# Patient Record
Sex: FEMALE | Race: WHITE | HISPANIC OR LATINO | URBAN - METROPOLITAN AREA
[De-identification: names, ages, dates, MRNs, and addresses within clinical notes are randomized per-mention and may not be internally consistent; named-entity substitution may affect disease eponyms.]

---
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
## 2021-06-04 ENCOUNTER — EMERGENCY (EMERGENCY)
Facility: HOSPITAL | Age: 21
LOS: 1 days | Discharge: HOME | End: 2021-06-04
Attending: EMERGENCY MEDICINE | Admitting: EMERGENCY MEDICINE
Payer: COMMERCIAL

## 2021-06-04 VITALS
HEART RATE: 80 BPM | SYSTOLIC BLOOD PRESSURE: 137 MMHG | RESPIRATION RATE: 16 BRPM | OXYGEN SATURATION: 98 % | TEMPERATURE: 98 F | DIASTOLIC BLOOD PRESSURE: 88 MMHG

## 2021-06-04 VITALS
SYSTOLIC BLOOD PRESSURE: 131 MMHG | HEART RATE: 86 BPM | HEIGHT: 68 IN | WEIGHT: 220.02 LBS | TEMPERATURE: 98 F | DIASTOLIC BLOOD PRESSURE: 88 MMHG | RESPIRATION RATE: 17 BRPM | OXYGEN SATURATION: 99 %

## 2021-06-04 DIAGNOSIS — E28.2 POLYCYSTIC OVARIAN SYNDROME: ICD-10-CM

## 2021-06-04 DIAGNOSIS — Z3A.01 LESS THAN 8 WEEKS GESTATION OF PREGNANCY: ICD-10-CM

## 2021-06-04 DIAGNOSIS — O23.41 UNSPECIFIED INFECTION OF URINARY TRACT IN PREGNANCY, FIRST TRIMESTER: ICD-10-CM

## 2021-06-04 DIAGNOSIS — F60.3 BORDERLINE PERSONALITY DISORDER: ICD-10-CM

## 2021-06-04 DIAGNOSIS — O26.891 OTHER SPECIFIED PREGNANCY RELATED CONDITIONS, FIRST TRIMESTER: ICD-10-CM

## 2021-06-04 LAB
ALBUMIN SERPL ELPH-MCNC: 4.8 G/DL — SIGNIFICANT CHANGE UP (ref 3.3–5)
ALP SERPL-CCNC: 68 U/L — SIGNIFICANT CHANGE UP (ref 40–120)
ALT FLD-CCNC: 16 U/L — SIGNIFICANT CHANGE UP (ref 10–45)
ANION GAP SERPL CALC-SCNC: 13 MMOL/L — SIGNIFICANT CHANGE UP (ref 5–17)
APPEARANCE UR: CLEAR — SIGNIFICANT CHANGE UP
AST SERPL-CCNC: 12 U/L — SIGNIFICANT CHANGE UP (ref 10–40)
BACTERIA # UR AUTO: PRESENT /HPF
BASOPHILS # BLD AUTO: 0.08 K/UL — SIGNIFICANT CHANGE UP (ref 0–0.2)
BASOPHILS NFR BLD AUTO: 0.7 % — SIGNIFICANT CHANGE UP (ref 0–2)
BILIRUB SERPL-MCNC: 0.6 MG/DL — SIGNIFICANT CHANGE UP (ref 0.2–1.2)
BILIRUB UR-MCNC: ABNORMAL
BUN SERPL-MCNC: 5 MG/DL — LOW (ref 7–23)
CALCIUM SERPL-MCNC: 9.7 MG/DL — SIGNIFICANT CHANGE UP (ref 8.4–10.5)
CHLORIDE SERPL-SCNC: 103 MMOL/L — SIGNIFICANT CHANGE UP (ref 96–108)
CO2 SERPL-SCNC: 23 MMOL/L — SIGNIFICANT CHANGE UP (ref 22–31)
COLOR SPEC: YELLOW — SIGNIFICANT CHANGE UP
CREAT SERPL-MCNC: 0.59 MG/DL — SIGNIFICANT CHANGE UP (ref 0.5–1.3)
DIFF PNL FLD: NEGATIVE — SIGNIFICANT CHANGE UP
EOSINOPHIL # BLD AUTO: 0.12 K/UL — SIGNIFICANT CHANGE UP (ref 0–0.5)
EOSINOPHIL NFR BLD AUTO: 1.1 % — SIGNIFICANT CHANGE UP (ref 0–6)
EPI CELLS # UR: ABNORMAL /HPF (ref 0–5)
GLUCOSE SERPL-MCNC: 99 MG/DL — SIGNIFICANT CHANGE UP (ref 70–99)
GLUCOSE UR QL: NEGATIVE — SIGNIFICANT CHANGE UP
HCG SERPL-ACNC: HIGH MIU/ML
HCT VFR BLD CALC: 38.4 % — SIGNIFICANT CHANGE UP (ref 34.5–45)
HGB BLD-MCNC: 12.9 G/DL — SIGNIFICANT CHANGE UP (ref 11.5–15.5)
HIV 1+2 AB+HIV1 P24 AG SERPL QL IA: SIGNIFICANT CHANGE UP
IMM GRANULOCYTES NFR BLD AUTO: 0.3 % — SIGNIFICANT CHANGE UP (ref 0–1.5)
KETONES UR-MCNC: >=80 MG/DL
LEUKOCYTE ESTERASE UR-ACNC: ABNORMAL
LIDOCAIN IGE QN: 10 U/L — SIGNIFICANT CHANGE UP (ref 7–60)
LYMPHOCYTES # BLD AUTO: 2.42 K/UL — SIGNIFICANT CHANGE UP (ref 1–3.3)
LYMPHOCYTES # BLD AUTO: 22.4 % — SIGNIFICANT CHANGE UP (ref 13–44)
MCHC RBC-ENTMCNC: 31 PG — SIGNIFICANT CHANGE UP (ref 27–34)
MCHC RBC-ENTMCNC: 33.6 GM/DL — SIGNIFICANT CHANGE UP (ref 32–36)
MCV RBC AUTO: 92.3 FL — SIGNIFICANT CHANGE UP (ref 80–100)
MONOCYTES # BLD AUTO: 0.62 K/UL — SIGNIFICANT CHANGE UP (ref 0–0.9)
MONOCYTES NFR BLD AUTO: 5.8 % — SIGNIFICANT CHANGE UP (ref 2–14)
NEUTROPHILS # BLD AUTO: 7.51 K/UL — HIGH (ref 1.8–7.4)
NEUTROPHILS NFR BLD AUTO: 69.7 % — SIGNIFICANT CHANGE UP (ref 43–77)
NITRITE UR-MCNC: NEGATIVE — SIGNIFICANT CHANGE UP
NRBC # BLD: 0 /100 WBCS — SIGNIFICANT CHANGE UP (ref 0–0)
PH UR: 8 — SIGNIFICANT CHANGE UP (ref 5–8)
PLATELET # BLD AUTO: 355 K/UL — SIGNIFICANT CHANGE UP (ref 150–400)
POTASSIUM SERPL-MCNC: 3.9 MMOL/L — SIGNIFICANT CHANGE UP (ref 3.5–5.3)
POTASSIUM SERPL-SCNC: 3.9 MMOL/L — SIGNIFICANT CHANGE UP (ref 3.5–5.3)
PROT SERPL-MCNC: 8 G/DL — SIGNIFICANT CHANGE UP (ref 6–8.3)
PROT UR-MCNC: ABNORMAL MG/DL
RBC # BLD: 4.16 M/UL — SIGNIFICANT CHANGE UP (ref 3.8–5.2)
RBC # FLD: 12.3 % — SIGNIFICANT CHANGE UP (ref 10.3–14.5)
RBC CASTS # UR COMP ASSIST: < 5 /HPF — SIGNIFICANT CHANGE UP
SODIUM SERPL-SCNC: 139 MMOL/L — SIGNIFICANT CHANGE UP (ref 135–145)
SP GR SPEC: 1.02 — SIGNIFICANT CHANGE UP (ref 1–1.03)
UROBILINOGEN FLD QL: 1 E.U./DL — SIGNIFICANT CHANGE UP
WBC # BLD: 10.78 K/UL — HIGH (ref 3.8–10.5)
WBC # FLD AUTO: 10.78 K/UL — HIGH (ref 3.8–10.5)
WBC UR QL: ABNORMAL /HPF

## 2021-06-04 PROCEDURE — 96375 TX/PRO/DX INJ NEW DRUG ADDON: CPT

## 2021-06-04 PROCEDURE — 99285 EMERGENCY DEPT VISIT HI MDM: CPT

## 2021-06-04 PROCEDURE — 87086 URINE CULTURE/COLONY COUNT: CPT

## 2021-06-04 PROCEDURE — 76801 OB US < 14 WKS SINGLE FETUS: CPT

## 2021-06-04 PROCEDURE — 86901 BLOOD TYPING SEROLOGIC RH(D): CPT

## 2021-06-04 PROCEDURE — 86900 BLOOD TYPING SEROLOGIC ABO: CPT

## 2021-06-04 PROCEDURE — 99284 EMERGENCY DEPT VISIT MOD MDM: CPT | Mod: 25

## 2021-06-04 PROCEDURE — 85025 COMPLETE CBC W/AUTO DIFF WBC: CPT

## 2021-06-04 PROCEDURE — 84702 CHORIONIC GONADOTROPIN TEST: CPT

## 2021-06-04 PROCEDURE — 81001 URINALYSIS AUTO W/SCOPE: CPT

## 2021-06-04 PROCEDURE — 83690 ASSAY OF LIPASE: CPT

## 2021-06-04 PROCEDURE — 76817 TRANSVAGINAL US OBSTETRIC: CPT

## 2021-06-04 PROCEDURE — 80053 COMPREHEN METABOLIC PANEL: CPT

## 2021-06-04 PROCEDURE — 96374 THER/PROPH/DIAG INJ IV PUSH: CPT

## 2021-06-04 PROCEDURE — 86850 RBC ANTIBODY SCREEN: CPT

## 2021-06-04 PROCEDURE — 36415 COLL VENOUS BLD VENIPUNCTURE: CPT

## 2021-06-04 PROCEDURE — 87389 HIV-1 AG W/HIV-1&-2 AB AG IA: CPT

## 2021-06-04 PROCEDURE — 76801 OB US < 14 WKS SINGLE FETUS: CPT | Mod: 26

## 2021-06-04 PROCEDURE — 76817 TRANSVAGINAL US OBSTETRIC: CPT | Mod: 26

## 2021-06-04 RX ORDER — NITROFURANTOIN MACROCRYSTAL 50 MG
1 CAPSULE ORAL
Qty: 20 | Refills: 0
Start: 2021-06-04 | End: 2021-06-13

## 2021-06-04 RX ORDER — METOCLOPRAMIDE HCL 10 MG
10 TABLET ORAL ONCE
Refills: 0 | Status: COMPLETED | OUTPATIENT
Start: 2021-06-04 | End: 2021-06-04

## 2021-06-04 RX ORDER — FAMOTIDINE 10 MG/ML
20 INJECTION INTRAVENOUS ONCE
Refills: 0 | Status: COMPLETED | OUTPATIENT
Start: 2021-06-04 | End: 2021-06-04

## 2021-06-04 RX ORDER — METOCLOPRAMIDE HCL 10 MG
1 TABLET ORAL
Qty: 120 | Refills: 0
Start: 2021-06-04 | End: 2021-07-03

## 2021-06-04 RX ORDER — SODIUM CHLORIDE 9 MG/ML
1000 INJECTION INTRAMUSCULAR; INTRAVENOUS; SUBCUTANEOUS ONCE
Refills: 0 | Status: COMPLETED | OUTPATIENT
Start: 2021-06-04 | End: 2021-06-04

## 2021-06-04 RX ADMIN — FAMOTIDINE 20 MILLIGRAM(S): 10 INJECTION INTRAVENOUS at 11:20

## 2021-06-04 RX ADMIN — Medication 10 MILLIGRAM(S): at 11:20

## 2021-06-04 RX ADMIN — SODIUM CHLORIDE 1000 MILLILITER(S): 9 INJECTION INTRAMUSCULAR; INTRAVENOUS; SUBCUTANEOUS at 11:20

## 2021-06-04 NOTE — ED ADULT NURSE NOTE - MODE OF DISCHARGE
Will continue current insulin regimen for now.  Will continue monitoring FS and FU. Will continue monitoring FS and FU. Ambulatory

## 2021-06-04 NOTE — ED PROVIDER NOTE - GASTROINTESTINAL, MLM
Abdomen soft, non-tender, no guarding, no CVA tenderness, epigastric pain on palpation with mild cramping Abdomen soft, non-tender, no guarding, no CVA tenderness, epigastric pain on palpation with mild lower abdominal cramping

## 2021-06-04 NOTE — ED PROVIDER NOTE - CLINICAL SUMMARY MEDICAL DECISION MAKING FREE TEXT BOX
Patient with recent knowledge of pregnancy with normal labs and rising beta hcg. Sonogram shows + IUP at 6 weeks. RH +   Patient is feeling better. Recommend follow up with OB/GYN for prenatal follow up.

## 2021-06-04 NOTE — ED PROVIDER NOTE - PATIENT PORTAL LINK FT
You can access the FollowMyHealth Patient Portal offered by [facility] by registering at the following website: [email or web address]. By joining [email or web address]’s FollowMyHealth portal, you will also be able to view your health information using other applications (apps) compatible with our system.

## 2021-06-04 NOTE — ED PROVIDER NOTE - NSFOLLOWUPINSTRUCTIONS_ED_ALL_ED_FT
First Trimester of Pregnancy       The first trimester of pregnancy is from week 1 until the end of week 12 (months 1 through 3). During this time, your baby will begin to develop inside you. At 6–8 weeks, the eyes and face are formed, and the heartbeat can be seen on ultrasound. At the end of 12 weeks, all the baby's organs are formed.      Body changes during your first trimester    Your body goes through many changes during pregnancy. Changes vary from woman to woman.    Physical changes     •You may gain or lose a couple of pounds at first.       •Your breasts may begin to grow larger and get tender. The area around your nipples may get darker.      •Dark spots or blotches may develop on your face. This will likely fade after your baby is born.      •Your hair may thin out or get thick. Your hair will likely return to normal after your baby is born.      Health changes     •You may feel like you might vomit (nauseous), and you may vomit. Tell your doctor if you vomit for a long time.      •You may have heartburn.       •You may have headaches. You can take medicines for headache. Tell your doctor before you take any medicines.      •You may have trouble pooping (constipation).      •Your gums may bleed.      Other changes     •You may get tired easily.      •You may pee (urinate) more often. Tell your doctor if you have pain when you pee.      •Your menstrual periods will stop.      •You may have a loss of appetite.      •You may have cravings for certain kinds of food.      •Your feelings may change from happiness to sadness on different days.      •You may have more dreams.        What to expect during prenatal care  Now that you are pregnant, you will want to do everything you can to have a healthy baby. One of the most important things that you can do to help you have a healthy pregnancy is to get prenatal care. Prenatal care is all the medical care you receive before the birth of your baby. During prenatal care, your doctor will:  •Do a physical exam.      •Do tests, including blood tests.      •Ask you questions about your health.        Follow these instructions at home:    Medicines     •Take over-the-counter and prescription medicines only as told by your doctor. Some medicines are safe and some medicines are not safe during pregnancy.      •Take a prenatal vitamin that contains at least 600 micrograms (mcg) of folic acid.        Eating and drinking    •Eat healthy meals that include:  •Fresh fruits and vegetables.      •Whole grains.      •Good sources of protein, such as meat, eggs, or tofu.      •Low-fat dairy products.        •Your doctor will tell you the amount of weight gain that is right for you.      •Avoid raw meat and uncooked cheese.    •If you feel like you may vomit, or you vomit:  •Eat 4 or 5 small meals a day instead of 3 large meals.      •Try eating a few soda crackers.      •Drink liquids between meals instead of during meals.      •You may need to take these actions to prevent or treat trouble pooping:  •Drink enough fluids to keep your pee (urine) pale yellow.      •Take over-the-counter or prescription medicines.       •Eat foods that are high in fiber. These include beans, whole grains, and fresh fruits and vegetables.       •Limit foods that are high in fat and sugar. These include fried or sweet foods.        Activity     •Exercise only as told by your doctor. Stop if you have cramps or pain in your lower belly (abdomen) or low back.      • Do not exercise if it is too hot, too humid, or if you are in a place of great height (high altitude).      •Try to avoid standing for long periods of time. Move your legs often if you must  one place for a long time.      •Avoid heavy lifting.      •Wear low-heeled shoes. Sit and stand up straight.      •You can have sex unless your doctor tells you not to.      Relieving pain and discomfort     •Wear a good support bra if your breasts are sore.      •Rest with your legs raised (elevated) if you have leg cramps or low back pain.    •If you have bulging veins (varicose veins) in your legs:  •Wear support hose as told by your doctor.      •Raise your feet for 15 minutes, 3–4 times a day.      •Limit salt in your food.        Prenatal care     •Schedule your prenatal visits by week 12 of the pregnancy.      •Write down your questions. Take them to your prenatal visits.      •Keep all your prenatal visits as told by your doctor. This is important.      Safety     •Wear your seat belt at all times when driving.      •Talk to your doctor if someone is verbally or physically abusive to you.       •Talk to your doctor if you are feeling sad or have thoughts of hurting yourself.      Lifestyle     • Do not use hot tubs, steam rooms, or saunas.      • Do not douche or use tampons or scented sanitary pads.      •Avoid all herbal remedies and alcohol. Do not use street drugs or medicines that are not approved by your doctor.      • Do not use any products that contain nicotine or tobacco, such as cigarettes, e-cigarettes, and chewing tobacco. If you need help quitting, ask your doctor. You may get counseling or other support to help you quit.      •Avoid cat litter boxes and soil used by cats. These carry germs that can cause birth defects in the baby and can cause a loss of your baby (miscarriage) or stillbirth.      General instructions     •Ask your doctor for a referral to a local prenatal class. Begin classes no later than at the start of month 6 of your pregnancy.      •Ask for help if you need counseling or if you need help with nutrition. Your doctor can give you advice or tell you where to go for help.      • Do not cross your legs for long periods of time.      •Visit your dentist. At home, brush your teeth with a soft toothbrush. Floss gently.        Where to find more information    •American Pregnancy Association: americanpregnancy.org        Contact a doctor if:    •You are dizzy.      •You have a fever.      •You have mild cramps or pressure in your lower belly.      •You have a nagging pain in your belly area.      •You continue to feel like you may vomit, you vomit, or you have watery poop (diarrhea) for 24 hours or longer.      •You have a bad smelling fluid coming from your vagina.      •You have pain when you pee.      •You are exposed to a disease that spreads from person to person, such as chickenpox, measles, Zika virus, HIV, or hepatitis.        Get help right away if:    •You have spotting or bleeding from your vagina.      •You have very bad belly cramping or pain.      •You have shortness of breath or chest pain.      •You have any kind of trauma, such as from a fall or a car accident.        Summary    •The first trimester of pregnancy is from week 1 until the end of week 12 (months 1 through 3).      •To take care of yourself and your unborn baby, you will need to eat healthy meals, take medicines only if your doctor tells you to do so, and do activities that are safe for you and your baby.      •Keep all your prenatal visits as told by your doctor. This is important.      This information is not intended to replace advice given to you by your health care provider. Make sure you discuss any questions you have with your health care provider.      Document Revised: 10/19/2020 Document Reviewed: 09/08/2020    [employer]vier Patient Education © 2021 [employer]

## 2021-06-04 NOTE — ED PROVIDER NOTE - OBJECTIVE STATEMENT
22 y/o F PMHx of PCOS, last LMP 2 mos ago. Patient presents today with nausea, vomiting, epigastric pain, some abd pain for the last 2 mos. Denies fevers, chest pain, SOB, vaginal bleeding/spotting, urinary symptoms. Patient notes that she is trying to conceive. 22 y/o F PMHx of PCOS, last LMP 2 mos ago. Patient presents today with nausea, vomiting, epigastric pain, some abd cramping for the last 2 mos. Denies fevers, chest pain, SOB, vaginal bleeding/spotting, urinary symptoms. Patient notes that she is trying to conceive. 20 y/o F PMHx of PCOS, last LMP 2 mos ago. Patient presents today with nausea, vomiting, epigastric pain, some abd cramping for the last 2 mos. Denies fevers, chest pain, SOB, vaginal bleeding/spotting, urinary symptoms. Patient states that she is trying to conceive.

## 2021-06-04 NOTE — ED ADULT TRIAGE NOTE - CHIEF COMPLAINT QUOTE
c/o diffused abdominal pain, nausea and vomiting x 1 week.  Last bm yesterday.  LMP 2 months ago.  Hx PCOS

## 2021-06-04 NOTE — ED PROVIDER NOTE - ATTENDING CONTRIBUTION TO CARE
20 yo w PCOS, LMP 2 months ago, 2 wks of n/v epigastric pain, no vag bleeding, + POS PREG  eval for ectopic , U/S pending, , UA, consider symptoms secondary to normal preg  cbta, minimal suprapubic tenderness,   dispo pending workup / reeval

## 2021-06-04 NOTE — ED ADULT NURSE NOTE - OBJECTIVE STATEMENT
Pt reports abd pain, n/v x 1 week. Pt reports LMP 2 months ago, hx of PCOS. Pt denies vaginal bleeding, chills, fever.

## 2021-06-05 LAB
CULTURE RESULTS: SIGNIFICANT CHANGE UP
SPECIMEN SOURCE: SIGNIFICANT CHANGE UP

## 2021-07-14 ENCOUNTER — APPOINTMENT (OUTPATIENT)
Dept: ANTEPARTUM | Facility: CLINIC | Age: 21
End: 2021-07-14
Payer: MEDICAID

## 2021-07-14 ENCOUNTER — ASOB RESULT (OUTPATIENT)
Age: 21
End: 2021-07-14

## 2021-07-14 PROBLEM — E28.2 POLYCYSTIC OVARIAN SYNDROME: Chronic | Status: ACTIVE | Noted: 2021-06-05

## 2021-07-14 PROCEDURE — 76801 OB US < 14 WKS SINGLE FETUS: CPT

## 2021-07-14 PROCEDURE — 76813 OB US NUCHAL MEAS 1 GEST: CPT

## 2021-07-19 ENCOUNTER — INPATIENT (INPATIENT)
Facility: HOSPITAL | Age: 21
LOS: 3 days | Discharge: HOME | DRG: 832 | End: 2021-07-23
Attending: PSYCHIATRY & NEUROLOGY | Admitting: PSYCHIATRY & NEUROLOGY
Payer: COMMERCIAL

## 2021-07-19 VITALS
SYSTOLIC BLOOD PRESSURE: 133 MMHG | HEART RATE: 104 BPM | DIASTOLIC BLOOD PRESSURE: 83 MMHG | OXYGEN SATURATION: 99 % | RESPIRATION RATE: 18 BRPM | HEIGHT: 68 IN | TEMPERATURE: 98 F | WEIGHT: 210.98 LBS

## 2021-07-19 DIAGNOSIS — F41.9 ANXIETY DISORDER, UNSPECIFIED: ICD-10-CM

## 2021-07-19 DIAGNOSIS — E28.2 POLYCYSTIC OVARIAN SYNDROME: ICD-10-CM

## 2021-07-19 DIAGNOSIS — F60.3 BORDERLINE PERSONALITY DISORDER: ICD-10-CM

## 2021-07-19 DIAGNOSIS — F32.2 MAJOR DEPRESSIVE DISORDER, SINGLE EPISODE, SEVERE WITHOUT PSYCHOTIC FEATURES: ICD-10-CM

## 2021-07-19 DIAGNOSIS — O99.342 OTHER MENTAL DISORDERS COMPLICATING PREGNANCY, SECOND TRIMESTER: ICD-10-CM

## 2021-07-19 DIAGNOSIS — O99.891 OTHER SPECIFIED DISEASES AND CONDITIONS COMPLICATING PREGNANCY: ICD-10-CM

## 2021-07-19 DIAGNOSIS — R45.851 SUICIDAL IDEATIONS: ICD-10-CM

## 2021-07-19 DIAGNOSIS — M41.9 SCOLIOSIS, UNSPECIFIED: ICD-10-CM

## 2021-07-19 DIAGNOSIS — F43.10 POST-TRAUMATIC STRESS DISORDER, UNSPECIFIED: ICD-10-CM

## 2021-07-19 DIAGNOSIS — Z3A.14 14 WEEKS GESTATION OF PREGNANCY: ICD-10-CM

## 2021-07-19 DIAGNOSIS — O99.282 ENDOCRINE, NUTRITIONAL AND METABOLIC DISEASES COMPLICATING PREGNANCY, SECOND TRIMESTER: ICD-10-CM

## 2021-07-19 DIAGNOSIS — F90.9 ATTENTION-DEFICIT HYPERACTIVITY DISORDER, UNSPECIFIED TYPE: ICD-10-CM

## 2021-07-19 LAB
ALBUMIN SERPL ELPH-MCNC: 3.9 G/DL — SIGNIFICANT CHANGE UP (ref 3.3–5)
ALP SERPL-CCNC: 83 U/L — SIGNIFICANT CHANGE UP (ref 40–120)
ALT FLD-CCNC: 46 U/L — HIGH (ref 10–45)
AMPHET UR-MCNC: NEGATIVE — SIGNIFICANT CHANGE UP
ANION GAP SERPL CALC-SCNC: 15 MMOL/L — SIGNIFICANT CHANGE UP (ref 5–17)
APAP SERPL-MCNC: <5 UG/ML — LOW (ref 10–30)
APPEARANCE UR: ABNORMAL
AST SERPL-CCNC: 27 U/L — SIGNIFICANT CHANGE UP (ref 10–40)
BACTERIA # UR AUTO: SIGNIFICANT CHANGE UP /HPF
BARBITURATES UR SCN-MCNC: NEGATIVE — SIGNIFICANT CHANGE UP
BASOPHILS # BLD AUTO: 0.04 K/UL — SIGNIFICANT CHANGE UP (ref 0–0.2)
BASOPHILS NFR BLD AUTO: 0.4 % — SIGNIFICANT CHANGE UP (ref 0–2)
BENZODIAZ UR-MCNC: NEGATIVE — SIGNIFICANT CHANGE UP
BILIRUB SERPL-MCNC: 0.3 MG/DL — SIGNIFICANT CHANGE UP (ref 0.2–1.2)
BILIRUB UR-MCNC: NEGATIVE — SIGNIFICANT CHANGE UP
BUN SERPL-MCNC: 5 MG/DL — LOW (ref 7–23)
CALCIUM SERPL-MCNC: 9.9 MG/DL — SIGNIFICANT CHANGE UP (ref 8.4–10.5)
CHLORIDE SERPL-SCNC: 103 MMOL/L — SIGNIFICANT CHANGE UP (ref 96–108)
CO2 SERPL-SCNC: 20 MMOL/L — LOW (ref 22–31)
COCAINE METAB.OTHER UR-MCNC: NEGATIVE — SIGNIFICANT CHANGE UP
COLOR SPEC: YELLOW — SIGNIFICANT CHANGE UP
COMMENT - URINE: SIGNIFICANT CHANGE UP
CREAT SERPL-MCNC: 0.52 MG/DL — SIGNIFICANT CHANGE UP (ref 0.5–1.3)
DIFF PNL FLD: NEGATIVE — SIGNIFICANT CHANGE UP
EOSINOPHIL # BLD AUTO: 0.09 K/UL — SIGNIFICANT CHANGE UP (ref 0–0.5)
EOSINOPHIL NFR BLD AUTO: 0.9 % — SIGNIFICANT CHANGE UP (ref 0–6)
EPI CELLS # UR: ABNORMAL /HPF (ref 0–5)
ETHANOL SERPL-MCNC: <10 MG/DL — SIGNIFICANT CHANGE UP (ref 0–10)
GLUCOSE SERPL-MCNC: 110 MG/DL — HIGH (ref 70–99)
GLUCOSE UR QL: NEGATIVE — SIGNIFICANT CHANGE UP
HCT VFR BLD CALC: 33.9 % — LOW (ref 34.5–45)
HGB BLD-MCNC: 11.4 G/DL — LOW (ref 11.5–15.5)
IMM GRANULOCYTES NFR BLD AUTO: 0.6 % — SIGNIFICANT CHANGE UP (ref 0–1.5)
KETONES UR-MCNC: 15 MG/DL
LEUKOCYTE ESTERASE UR-ACNC: ABNORMAL
LYMPHOCYTES # BLD AUTO: 1.68 K/UL — SIGNIFICANT CHANGE UP (ref 1–3.3)
LYMPHOCYTES # BLD AUTO: 17.6 % — SIGNIFICANT CHANGE UP (ref 13–44)
MCHC RBC-ENTMCNC: 30.6 PG — SIGNIFICANT CHANGE UP (ref 27–34)
MCHC RBC-ENTMCNC: 33.6 GM/DL — SIGNIFICANT CHANGE UP (ref 32–36)
MCV RBC AUTO: 90.9 FL — SIGNIFICANT CHANGE UP (ref 80–100)
METHADONE UR-MCNC: NEGATIVE — SIGNIFICANT CHANGE UP
MONOCYTES # BLD AUTO: 0.44 K/UL — SIGNIFICANT CHANGE UP (ref 0–0.9)
MONOCYTES NFR BLD AUTO: 4.6 % — SIGNIFICANT CHANGE UP (ref 2–14)
NEUTROPHILS # BLD AUTO: 7.25 K/UL — SIGNIFICANT CHANGE UP (ref 1.8–7.4)
NEUTROPHILS NFR BLD AUTO: 75.9 % — SIGNIFICANT CHANGE UP (ref 43–77)
NITRITE UR-MCNC: NEGATIVE — SIGNIFICANT CHANGE UP
NRBC # BLD: 0 /100 WBCS — SIGNIFICANT CHANGE UP (ref 0–0)
OPIATES UR-MCNC: NEGATIVE — SIGNIFICANT CHANGE UP
PCP SPEC-MCNC: SIGNIFICANT CHANGE UP
PCP UR-MCNC: NEGATIVE — SIGNIFICANT CHANGE UP
PH UR: 7 — SIGNIFICANT CHANGE UP (ref 5–8)
PLATELET # BLD AUTO: 325 K/UL — SIGNIFICANT CHANGE UP (ref 150–400)
POTASSIUM SERPL-MCNC: 3.9 MMOL/L — SIGNIFICANT CHANGE UP (ref 3.5–5.3)
POTASSIUM SERPL-SCNC: 3.9 MMOL/L — SIGNIFICANT CHANGE UP (ref 3.5–5.3)
PROT SERPL-MCNC: 6.9 G/DL — SIGNIFICANT CHANGE UP (ref 6–8.3)
PROT UR-MCNC: ABNORMAL MG/DL
RBC # BLD: 3.73 M/UL — LOW (ref 3.8–5.2)
RBC # FLD: 11.9 % — SIGNIFICANT CHANGE UP (ref 10.3–14.5)
RBC CASTS # UR COMP ASSIST: < 5 /HPF — SIGNIFICANT CHANGE UP
SALICYLATES SERPL-MCNC: <0.3 MG/DL — LOW (ref 2.8–20)
SARS-COV-2 RNA SPEC QL NAA+PROBE: SIGNIFICANT CHANGE UP
SODIUM SERPL-SCNC: 138 MMOL/L — SIGNIFICANT CHANGE UP (ref 135–145)
SP GR SPEC: 1.02 — SIGNIFICANT CHANGE UP (ref 1–1.03)
THC UR QL: POSITIVE
UROBILINOGEN FLD QL: 1 E.U./DL — SIGNIFICANT CHANGE UP
WBC # BLD: 9.56 K/UL — SIGNIFICANT CHANGE UP (ref 3.8–10.5)
WBC # FLD AUTO: 9.56 K/UL — SIGNIFICANT CHANGE UP (ref 3.8–10.5)
WBC UR QL: ABNORMAL /HPF

## 2021-07-19 PROCEDURE — 76805 OB US >/= 14 WKS SNGL FETUS: CPT | Mod: 26

## 2021-07-19 PROCEDURE — 93010 ELECTROCARDIOGRAM REPORT: CPT

## 2021-07-19 PROCEDURE — 90792 PSYCH DIAG EVAL W/MED SRVCS: CPT

## 2021-07-19 PROCEDURE — 76817 TRANSVAGINAL US OBSTETRIC: CPT | Mod: 26

## 2021-07-19 PROCEDURE — 99285 EMERGENCY DEPT VISIT HI MDM: CPT

## 2021-07-19 RX ORDER — ONDANSETRON 8 MG/1
8 TABLET, FILM COATED ORAL EVERY 8 HOURS
Refills: 0 | Status: DISCONTINUED | OUTPATIENT
Start: 2021-07-19 | End: 2021-07-19

## 2021-07-19 RX ORDER — LANOLIN ALCOHOL/MO/W.PET/CERES
1 CREAM (GRAM) TOPICAL AT BEDTIME
Refills: 0 | Status: DISCONTINUED | OUTPATIENT
Start: 2021-07-19 | End: 2021-07-23

## 2021-07-19 RX ORDER — ONDANSETRON 8 MG/1
4 TABLET, FILM COATED ORAL THREE TIMES A DAY
Refills: 0 | Status: DISCONTINUED | OUTPATIENT
Start: 2021-07-19 | End: 2021-07-23

## 2021-07-19 RX ORDER — HALOPERIDOL DECANOATE 100 MG/ML
2 INJECTION INTRAMUSCULAR EVERY 6 HOURS
Refills: 0 | Status: DISCONTINUED | OUTPATIENT
Start: 2021-07-19 | End: 2021-07-23

## 2021-07-19 RX ORDER — ACETAMINOPHEN 500 MG
650 TABLET ORAL EVERY 6 HOURS
Refills: 0 | Status: DISCONTINUED | OUTPATIENT
Start: 2021-07-19 | End: 2021-07-23

## 2021-07-19 NOTE — ED ADULT TRIAGE NOTE - ESI TRIAGE ACUITY LEVEL, MLM
[phone].    Thank you for choosing [facility] 2

## 2021-07-19 NOTE — ED ADULT NURSE REASSESSMENT NOTE - NS ED NURSE REASSESS COMMENT FT1
pt remains on 1:1 CO for safety and elopement risk, awaiting bed assignment on psych floor but pt is 14 weeks pregnant must get gyn c/s and med clearance

## 2021-07-19 NOTE — ED PROVIDER NOTE - OBJECTIVE STATEMENT
20 y/o, , approximately 14 weeks pregnant, w/ Hx of depression, borderline personality disorder presents to ED for suicidal thoughts for a while, which got acutely worst in the past few days. Pt reports thinking about overdosing on med or jump in front of ongoing traffic. Also states that she has thoughts about hurting other people if they make her angry. Denies hallucinations, drug or alcohol use. Reports having vaginal bleeding which was treated and resolved after BV. Denies abd pain, fevers, chills or other symptoms. 20 y/o, , approximately 14 weeks pregnant, w/ Hx of depression, borderline personality disorder presents to ED for suicidal thoughts for a while, which got acutely worse in the past few days. Pt reports thinking about overdosing on med or jumping in front of ongoing traffic. Also states that she has thoughts about hurting other people if they make her angry. Denies hallucinations, drug or alcohol use. Reports having vaginal spotting 2 wks ago, was dx'd w/ BV and tx'd w/ meds with resolution of spotting. Denies abd pain, urinary sx's, fevers, chills or other symptoms.

## 2021-07-19 NOTE — ED PROVIDER NOTE - PHYSICAL EXAMINATION
VITAL SIGNS: I have reviewed nursing notes and confirm.  CONSTITUTIONAL: In no acute distress.   SKIN:  warm and dry, no acute rash.   HEAD:  normocephalic, atraumatic.  EYES: EOM intact; conjunctiva and sclera clear.  ENT: No nasal discharge; airway clear.   NECK: Supple; non tender.  CARD: S1, S2 normal; no murmurs, gallops, or rubs. Regular rate and rhythm.   RESP:  Clear to auscultation b/l, no wheezes, rales or rhonchi.  ABD: Normal bowel sounds; soft; non-distended; non-tender; no guarding/ rebound.  EXT: Normal ROM. No clubbing, cyanosis or edema. 2+ pulses to b/l ue/le.  NEURO: Alert, oriented, grossly unremarkable  PSYCH: Cooperative, mood and affect appropriate.

## 2021-07-19 NOTE — ED ADULT NURSE NOTE - OBJECTIVE STATEMENT
pt received into  7 spot A&Ox3 ambulatory appears comfortable arrives via walk in triage with mother for eval of suicidal thoughts x 1 week hx borderline personality disorder with attempt 3 years ago pt is currently 14 weeks pregnant denies homicidal ideations or plan at this time. makes good eye contact appears good hygiene no medical complaints currently. placed on 1:1 CP for safety elopement risk pt willingly provided urine blood samples allowed for covid swab awaiting md and psych eval in nad will monitor and reassess

## 2021-07-19 NOTE — ED BEHAVIORAL HEALTH ASSESSMENT NOTE - HPI (INCLUDE ILLNESS QUALITY, SEVERITY, DURATION, TIMING, CONTEXT, MODIFYING FACTORS, ASSOCIATED SIGNS AND SYMPTOMS)
21 domiciled, pregnant F, pph MDD, Borderline PD, hx of 4 suicide attempts, previous psychiatric admissions - most recently 2018, not currently in Outpatient care, pmh Scoliosis, PCOS, current ear infection - on Amoxicillin, BIBS accompanied by mother endorsing worsening suicidal ideation over the past few days. Pt. seen alongside Psychology Intern in the presence of pt's mother who remained present during evaluation at pt request, the pt is sitting in a chair, dressed in hospital gown, hair dyed blue, calm, cooperative. Pt. reports her mood recently as "down." She reports "don't have any" energy, "horrible" concentration, "excessive" sleep, and "barely eat" appetite. Pt. reports recent feelings of guilt, helplessness, hopelessness, and worthlessness. Pt. reports suicidal ideation with plan to overdose or walk in front of traffic, she reports chronic passive SI but states it intensified significantly yesterday as when she started formulating a plan. Pt. reports 4 previous attempts, most recently 3 years ago when she jumped in front of traffic; pt reports 2 prior attempts by cutting and one by overdose. Pt. denies current HI, though notes she has had these thoughts in the past. Pt. denies AH/VH.   Pt. denies recent substance use other than smoking MJ about a month ago before she learned she was pregnant (utox + THC).  Pt. reports several medication trials in the past, none of which were very effective, most recently Celexa 40mg which she stopped 6months ago at which time she also stopped seeing a psychiatrist. Pt. reports a hx of cutting in her teenage years, most recently 3 years ago.  Note - pt denies having taken Lamictal in the past but records indicative she has been prescribed it in the past (no suspicion pt was intentionally misleading).     Mother supports all information as provided by the patient. 21 domiciled, pregnant F, pph MDD, Borderline PD, hx of 4 suicide attempts, previous psychiatric admissions - most recently 2018, not currently in Outpatient care, pmh Scoliosis, PCOS, current yeast infection - on Monostat day 2/7, BIBS accompanied by mother endorsing worsening suicidal ideation over the past few days. Pt. seen alongside Psychology Intern in the presence of pt's mother who remained present during evaluation at pt request, the pt is sitting in a chair, dressed in hospital gown, hair dyed blue, calm, cooperative. Pt. reports her mood recently as "down." She reports "don't have any" energy, "horrible" concentration, "excessive" sleep, and "barely eat" appetite. Pt. reports recent feelings of guilt, helplessness, hopelessness, and worthlessness. Pt. reports suicidal ideation with plan to overdose or walk in front of traffic, she reports chronic passive SI but states it intensified significantly yesterday as when she started formulating a plan. Pt. reports 4 previous attempts, most recently 3 years ago when she jumped in front of traffic; pt reports 2 prior attempts by cutting and one by overdose. Pt. denies current HI, though notes she has had these thoughts in the past. Pt. denies AH/VH.   Pt. denies recent substance use other than smoking MJ about a month ago before she learned she was pregnant (utox + THC).  Pt. reports several medication trials in the past, none of which were very effective, most recently Celexa 40mg which she stopped 6months ago at which time she also stopped seeing a psychiatrist. Pt. reports a hx of cutting in her teenage years, most recently 3 years ago.  Note - pt denies having taken Lamictal in the past but records indicative she has been prescribed it in the past (no suspicion pt was intentionally misleading).     Mother supports all information as provided by the patient.    current Monostat and Zofran prescriptions confirmed with pt's pharmacy:   [facility]   [address] [place] [address]  Phone: [phone]

## 2021-07-19 NOTE — ED BEHAVIORAL HEALTH ASSESSMENT NOTE - SUMMARY
21 domiciled, pregnant F, pph MDD, Borderline PD, hx of 4 suicide attempts, previous psychiatric admissions - most recently 2018, not currently in Outpatient care, pmh Scoliosis, PCOS, current ear infection - on Amoxicillin, BIBS accompanied by mother endorsing worsening suicidal ideation over the past few days.  Pt. hx of Borderline PD and previous ED presentation for "feelings of wanting to hurt a woman she saw earlier today" are appreciated, however, current episode appears to represent a severe disturbance in affective state with a high risk potential given pt's complete psychiatric hx and current comorbidities i.e. pregnancy; pt's decision to admit herself Voluntarily for stabilization is strongly supported. 22yo domiciled, pregnant F, pph MDD, Borderline PD, hx of 4 suicide attempts, previous psychiatric admissions - most recently 2018, not currently in Outpatient care, pmh Scoliosis, PCOS, current yeast infection - on Monostat, BIBS accompanied by mother endorsing worsening suicidal ideation over the past few days.  Pt. hx of Borderline PD and previous ED presentation for "feelings of wanting to hurt a woman she saw earlier today" are appreciated, however, current episode appears to represent a severe disturbance in affective state with a high risk potential given pt's complete psychiatric hx and current comorbidities i.e. pregnancy; pt's decision to admit herself Voluntarily for stabilization and to ensure safety is strongly supported.

## 2021-07-19 NOTE — ED ADULT NURSE REASSESSMENT NOTE - NS ED NURSE REASSESS COMMENT FT1
pt becoming slightly agitated requesting to go upstairs and specific foods not kept in Ed. will attempt to call kitchen to follow up
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
## 2021-07-19 NOTE — CONSULT NOTE ADULT - ASSESSMENT
20yo  at 14+0 by 1st TM US (last LMP unknown, SKINNY 22) with PCOS, MDD, and borderline personality disorder with hx of multiple suicide attempts, currently being admitted for SI.  -VSS  -Pt has close outpt OB followup and seems to be very compliant with her appointments and care  -Will f/u OB US       22yo  at 14+0 by 1st TM US (last LMP unknown, SKINNY 22) with PCOS, MDD, and borderline personality disorder with hx of multiple suicide attempts, currently being admitted for SI.  -VSS  -Pt has close outpt OB followup and seems to be very compliant with her appointments and care  -Will f/u OB US  -Pending normal US, OB will sign off   -Can reconsult at any time    Discussed with Dr. Rowell

## 2021-07-19 NOTE — CONSULT NOTE ADULT - ATTENDING COMMENTS
Agree with plan for admission on the psych service.   No active OB issues at this time.   If patient will be here for several weeks will follow for routine care.

## 2021-07-19 NOTE — ED PROVIDER NOTE - PROGRESS NOTE DETAILS
cristian: pt was NOT signed out to me. asked by charge nurse to place us pelvis/tv per gyn request. gyn contacted. they will fu on us results and continue to follow pt upon admission. no longer involved.
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
## 2021-07-19 NOTE — ED ADULT NURSE REASSESSMENT NOTE - NS ED NURSE REASSESS COMMENT FT1
pt remains on 1:1 CO for safety and elopement risk eating and drinking in nad at this time awaiting covid swab result and admission

## 2021-07-19 NOTE — ED PROVIDER NOTE - CLINICAL SUMMARY MEDICAL DECISION MAKING FREE TEXT BOX
Impression: 14 weeks pregnant F, , w/ Hx of depression and borderline personality depression, who presents to ED for SI and occasional HI. Will obtain psych consult and await recommendation,. Impression: 14 weeks pregnant F, , w/ Hx of depression and borderline personality depression, who presents to ED for worsening depression and SI. Pt seen by psych and admission recommended for inpt tx. Tylenol, ASA, ETOH levels neg. Utox + for marijuana. UA neg for infection, + small ketones- pt given po fluids. COVID neg. EKG non-ischemic. Pt has no medical contraindications to psych admission.

## 2021-07-19 NOTE — ED BEHAVIORAL HEALTH ASSESSMENT NOTE - RISK ASSESSMENT
High Acute Suicide Risk Static: history of suicide attempt, hx of substance abuse, hx of impulsive behavior.  Modifiable: Not currently engaged in treatment  Protective: Strong familial relationships, fear of death/dying  Current Risk: HIGH  Current Risk will be mitigated with Inpatient Hospitalization.
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
## 2021-07-19 NOTE — ED BEHAVIORAL HEALTH ASSESSMENT NOTE - OTHER PAST PSYCHIATRIC HISTORY (INCLUDE DETAILS REGARDING ONSET, COURSE OF ILLNESS, INPATIENT/OUTPATIENT TREATMENT)
Four hospitalizations at [facility], last was in January 2018 for three weeks after disappearing for two weeks. Many hospitalizations have been in context of disappearing or oppositional behavior, also one time for taking an overdose. Was in outpatient treatment at [facility] until went to [facility] in April 2018, never went back since returning. Has done DBT at [facility] at 15 years old quit 2 months, tried again at 17 (beginning of March 2018) at [facility].

## 2021-07-19 NOTE — ED ADULT TRIAGE NOTE - CHIEF COMPLAINT QUOTE
patient is 14 weeks pregnant . she states that she has been having suicidal thoughts for the last week. hx of attempt 3 years ago.

## 2021-07-20 DIAGNOSIS — F60.3 BORDERLINE PERSONALITY DISORDER: ICD-10-CM

## 2021-07-20 DIAGNOSIS — B37.9 CANDIDIASIS, UNSPECIFIED: ICD-10-CM

## 2021-07-20 DIAGNOSIS — F32.9 MAJOR DEPRESSIVE DISORDER, SINGLE EPISODE, UNSPECIFIED: ICD-10-CM

## 2021-07-20 LAB
A1C WITH ESTIMATED AVERAGE GLUCOSE RESULT: 4.7 % — SIGNIFICANT CHANGE UP (ref 4–5.6)
ALBUMIN SERPL ELPH-MCNC: 3.6 G/DL — SIGNIFICANT CHANGE UP (ref 3.3–5)
ALP SERPL-CCNC: 58 U/L — SIGNIFICANT CHANGE UP (ref 40–120)
ALT FLD-CCNC: 39 U/L — SIGNIFICANT CHANGE UP (ref 10–45)
ANION GAP SERPL CALC-SCNC: 10 MMOL/L — SIGNIFICANT CHANGE UP (ref 5–17)
AST SERPL-CCNC: 17 U/L — SIGNIFICANT CHANGE UP (ref 10–40)
BILIRUB SERPL-MCNC: 0.2 MG/DL — SIGNIFICANT CHANGE UP (ref 0.2–1.2)
BUN SERPL-MCNC: 5 MG/DL — LOW (ref 7–23)
CALCIUM SERPL-MCNC: 9.1 MG/DL — SIGNIFICANT CHANGE UP (ref 8.4–10.5)
CHLORIDE SERPL-SCNC: 105 MMOL/L — SIGNIFICANT CHANGE UP (ref 96–108)
CHOLEST SERPL-MCNC: 172 MG/DL — SIGNIFICANT CHANGE UP
CO2 SERPL-SCNC: 22 MMOL/L — SIGNIFICANT CHANGE UP (ref 22–31)
COVID-19 SPIKE DOMAIN AB INTERP: POSITIVE
COVID-19 SPIKE DOMAIN ANTIBODY RESULT: 226 U/ML — HIGH
CREAT SERPL-MCNC: 0.52 MG/DL — SIGNIFICANT CHANGE UP (ref 0.5–1.3)
ESTIMATED AVERAGE GLUCOSE: 88 MG/DL — SIGNIFICANT CHANGE UP (ref 68–114)
GLUCOSE SERPL-MCNC: 85 MG/DL — SIGNIFICANT CHANGE UP (ref 70–99)
HCG SERPL-ACNC: HIGH MIU/ML
HCT VFR BLD CALC: 31.6 % — LOW (ref 34.5–45)
HDLC SERPL-MCNC: 48 MG/DL — LOW
HGB BLD-MCNC: 10.7 G/DL — LOW (ref 11.5–15.5)
LIPID PNL WITH DIRECT LDL SERPL: 73 MG/DL — SIGNIFICANT CHANGE UP
MCHC RBC-ENTMCNC: 30.7 PG — SIGNIFICANT CHANGE UP (ref 27–34)
MCHC RBC-ENTMCNC: 33.9 GM/DL — SIGNIFICANT CHANGE UP (ref 32–36)
MCV RBC AUTO: 90.8 FL — SIGNIFICANT CHANGE UP (ref 80–100)
NON HDL CHOLESTEROL: 124 MG/DL — SIGNIFICANT CHANGE UP
NRBC # BLD: 0 /100 WBCS — SIGNIFICANT CHANGE UP (ref 0–0)
PLATELET # BLD AUTO: 294 K/UL — SIGNIFICANT CHANGE UP (ref 150–400)
POTASSIUM SERPL-MCNC: 3.8 MMOL/L — SIGNIFICANT CHANGE UP (ref 3.5–5.3)
POTASSIUM SERPL-SCNC: 3.8 MMOL/L — SIGNIFICANT CHANGE UP (ref 3.5–5.3)
PROT SERPL-MCNC: 6.1 G/DL — SIGNIFICANT CHANGE UP (ref 6–8.3)
RBC # BLD: 3.48 M/UL — LOW (ref 3.8–5.2)
RBC # FLD: 11.9 % — SIGNIFICANT CHANGE UP (ref 10.3–14.5)
SARS-COV-2 IGG+IGM SERPL QL IA: 226 U/ML — HIGH
SARS-COV-2 IGG+IGM SERPL QL IA: POSITIVE
SODIUM SERPL-SCNC: 137 MMOL/L — SIGNIFICANT CHANGE UP (ref 135–145)
TRIGL SERPL-MCNC: 256 MG/DL — HIGH
TSH SERPL-MCNC: 0.24 UIU/ML — LOW (ref 0.27–4.2)
WBC # BLD: 8.06 K/UL — SIGNIFICANT CHANGE UP (ref 3.8–10.5)
WBC # FLD AUTO: 8.06 K/UL — SIGNIFICANT CHANGE UP (ref 3.8–10.5)

## 2021-07-20 RX ORDER — SERTRALINE 25 MG/1
25 TABLET, FILM COATED ORAL ONCE
Refills: 0 | Status: COMPLETED | OUTPATIENT
Start: 2021-07-20 | End: 2021-07-20

## 2021-07-20 RX ORDER — DIPHENHYDRAMINE HCL 50 MG
25 CAPSULE ORAL ONCE
Refills: 0 | Status: COMPLETED | OUTPATIENT
Start: 2021-07-20 | End: 2021-07-20

## 2021-07-20 RX ORDER — SERTRALINE 25 MG/1
25 TABLET, FILM COATED ORAL DAILY
Refills: 0 | Status: DISCONTINUED | OUTPATIENT
Start: 2021-07-20 | End: 2021-07-23

## 2021-07-20 RX ADMIN — Medication 1 APPLICATORFUL: at 21:44

## 2021-07-20 RX ADMIN — Medication 1 TABLET(S): at 12:42

## 2021-07-20 RX ADMIN — Medication 25 MILLIGRAM(S): at 22:23

## 2021-07-20 NOTE — BEHAVIORAL HEALTH ASSESSMENT NOTE - NSBHCHARTREVIEWIMAGING_PSY_A_CORE FT
OB US second trimester 7/19: single viable intrauterine gestation is visible with an average ultrasound age of 14 weeks 1 day.

## 2021-07-20 NOTE — BEHAVIORAL HEALTH ASSESSMENT NOTE - CASE SUMMARY
Met with pt, sister, and mother. Pt is no longer suicidal at this time and requesting discharge sooner rather than later. Explained to pt & family that in order to set up intensive outpatient services and/or DBT element of treatment, pt would need to stay longer than 72 hours as pt speaking of putting in 72 hour letter immediately. They voiced understanding and said they would see how she tolerated being on the unit. Will also discuss with team regarding our PMAD.
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
## 2021-07-20 NOTE — BEHAVIORAL HEALTH ASSESSMENT NOTE - RISK ASSESSMENT
Moderate Acute Suicide Risk Static: pts past psychiatric history, suicide attempts  Modifiable: aggressive behavior, therapy, relationships with family/friends  Protective: mother and sister support system, pregnancy is a big motivating factor for pt

## 2021-07-20 NOTE — BEHAVIORAL HEALTH ASSESSMENT NOTE - NSBHCHARTREVIEWLAB_PSY_A_CORE FT
10.7   8.06  )-----------( 294      ( 20 Jul 2021 07:26 )             31.6   LIVER FUNCTIONS - ( 20 Jul 2021 07:26 )  Alb: 3.6 g/dL / Pro: 6.1 g/dL / ALK PHOS: 58 U/L / ALT: 39 U/L / AST: 17 U/L / GGT: x         a1c: 4.7

## 2021-07-20 NOTE — BEHAVIORAL HEALTH ASSESSMENT NOTE - SUMMARY
22 yo single, unemployed, domicile (for 2 months with mother, previously homeless x 2 years), 14 weeks pregnant female with past psychiatric history including 8 previous psychiatric hospitalizations (last in 2018), 4 prior suicide attempts (all resulting in no physical harm but resulted in psychiatric admission), last therapist appt in 2020, diagnosed with borderline personality disorder, MDD, PTSD, ADHD, anxiety with pmhx scoliosis, PCOS, current yeast infection (on day 3/7 Monostat) BIBS and admitted voluntarily to 8 uris.    Pt alert and cooperative seen around the unit today. Agreeable to initiate medication therapy and is future oriented about seeing a therapist after discharge.

## 2021-07-20 NOTE — BEHAVIORAL HEALTH ASSESSMENT NOTE - NSBHADMITCOUNSELOTHER_PSY_A_CORE FT
Discussed problems pt has had on other inpatient units including episodes of verbal aggression, discussed pt's goals, and pt's triggers

## 2021-07-20 NOTE — BEHAVIORAL HEALTH ASSESSMENT NOTE - PROBLEM SELECTOR PLAN 1
-I/G/M therapy  -Consider initiating antidepressant -I/G/M therapy  -Consider initiating antidepressant such as zoloft 25 mg daily

## 2021-07-20 NOTE — BEHAVIORAL HEALTH ASSESSMENT NOTE - DESCRIPTION (FIRST USE, LAST USE, QUANTITY, FREQUENCY, DURATION)
last use several weeks ago, states she smoked up until she found out she was pregnant 8 weeks ago. Pt claims she used to be a "pot head" and would smoke 14.5g of marijuana a day before finding out she was pregnant only used for 1 year in lifetime, last used in 2018 last use in January 2021, pt states she doesn't drink alcohol much but it used to be a problem in her adolescence, pt would drink 1pint of liquor a day
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
## 2021-07-20 NOTE — BEHAVIORAL HEALTH ASSESSMENT NOTE - OTHER PAST PSYCHIATRIC HISTORY (INCLUDE DETAILS REGARDING ONSET, COURSE OF ILLNESS, INPATIENT/OUTPATIENT TREATMENT)
Pt with 8 past psychiatric hospitalizations, most recently in 2018 (majority at [facility]). Pt with 4 past suicide attempts (in [id number])all resulting in no medical harm but psychiatric admissions), one by jumping in front of traffic, one by OD on OTC medications (Benadryl), 2x by cutting her wrists.
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
## 2021-07-20 NOTE — BEHAVIORAL HEALTH ASSESSMENT NOTE - SUICIDE PROTECTIVE FACTORS
Responsibility to family and others/Identifies reasons for living/Has future plans/Positive therapeutic relationships Responsibility to family and others/Identifies reasons for living/Has future plans/Supportive social network of family or friends/Positive therapeutic relationships

## 2021-07-20 NOTE — BEHAVIORAL HEALTH ASSESSMENT NOTE - NSBHADMITIPSTRENGTH_PSY_A_CORE
pregnancy and responsibilities to future child/Motivated and ready for change/Has supportive interpersonal relationships with family, friends or peers/Has access to housing/residential stability/Cooperative with treatment/other

## 2021-07-20 NOTE — BEHAVIORAL HEALTH ASSESSMENT NOTE - OTHER
pt pregnant and seeing duties and responsibilities of motherhood n/a pt with feelings of never being good enough, wasn't getting therapy/medications, pt is recently pregnant believes that the increase in hormones has been hard and intense for her, sees a lot of things as black and white including the fact if she should take her life or not
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
## 2021-07-20 NOTE — BEHAVIORAL HEALTH ASSESSMENT NOTE - SUICIDE RISK FACTORS
Hopelessness or despair/Alcohol/Substance abuse disorders/Agitation/Severe Anxiety/Panic/Family history of suicide/Impulsivity/PTSD current/past/ADHD current/past/Family History of Suicidal behavior

## 2021-07-20 NOTE — BEHAVIORAL HEALTH ASSESSMENT NOTE - NSBHCHARTREVIEWVS_PSY_A_CORE FT
Vital Signs Last 24 Hrs  T(C): 36.7 (20 Jul 2021 09:00), Max: 37 (19 Jul 2021 21:42)  T(F): 98.1 (20 Jul 2021 09:00), Max: 98.6 (19 Jul 2021 21:42)  HR: 86 (20 Jul 2021 09:00) (77 - 91)  BP: 124/83 (20 Jul 2021 09:00) (105/62 - 124/83)  BP(mean): --  RR: 20 (20 Jul 2021 09:00) (16 - 20)  SpO2: 100% (20 Jul 2021 09:00) (98% - 100%)

## 2021-07-21 RX ADMIN — Medication 1 TABLET(S): at 14:26

## 2021-07-21 RX ADMIN — Medication 1 APPLICATORFUL: at 21:56

## 2021-07-21 RX ADMIN — Medication 1 MILLIGRAM(S): at 21:56

## 2021-07-21 RX ADMIN — ONDANSETRON 4 MILLIGRAM(S): 8 TABLET, FILM COATED ORAL at 09:08

## 2021-07-21 RX ADMIN — SERTRALINE 25 MILLIGRAM(S): 25 TABLET, FILM COATED ORAL at 11:21
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
## 2021-07-21 NOTE — PROGRESS NOTE BEHAVIORAL HEALTH - SUMMARY
20 yo single, unemployed, domicile (for 2 months with mother, previously homeless x 2 years), 14 weeks pregnant female with past psychiatric history including 8 previous psychiatric hospitalizations (last in 2018), 4 prior suicide attempts (all resulting in no physical harm but resulted in psychiatric admission), last therapist appt in 2020, diagnosed with borderline personality disorder, MDD, PTSD, ADHD, anxiety with pmhx scoliosis, PCOS, current yeast infection (on day 3/7 Monostat) BIBS and admitted voluntarily to 8 uris.    Pt visibly upset and agitated today after discussion about possible waiting longer than 72 hours for placement into an outpatient therapy option. Focused on discharge. Began Zoloft 25mg yesterday. Continuing clotramizole cream for yeast infection. Plan to consult OBGyn for possible 3hr OGTT. 22 yo single, unemployed, domicile (for 2 months with mother, previously homeless x 2 years), 14 weeks pregnant female with past psychiatric history including 8 previous psychiatric hospitalizations (last in 2018), 4 prior suicide attempts (all resulting in no physical harm but resulted in psychiatric admission), last therapist appt in 2020, diagnosed with borderline personality disorder, MDD, PTSD, ADHD, anxiety with pmhx scoliosis, PCOS, current yeast infection (on day 3/7 Monostat) BIBS and admitted voluntarily to 8 uris.    Pt visibly upset and agitated today after discussion about possible waiting longer than 72 hours for placement into an outpatient therapy option. Focused on discharge. Began Zoloft 25mg yesterday. Continuing clotramizole cream for yeast infection. Plan to consult OBGyn for possible 3hr OGTT and fu on yeast infection.

## 2021-07-21 NOTE — PROGRESS NOTE BEHAVIORAL HEALTH - NSBHADMITCOUNSELOTHER_PSY_A_CORE FT
Discussed pros and cons of staying beyond the weekend, how [facility] or some such program might help, what her future goals are (cosmetology), and the drawbacks of her current living situation.

## 2021-07-22 RX ADMIN — ONDANSETRON 4 MILLIGRAM(S): 8 TABLET, FILM COATED ORAL at 11:12

## 2021-07-22 RX ADMIN — SERTRALINE 25 MILLIGRAM(S): 25 TABLET, FILM COATED ORAL at 09:47

## 2021-07-22 RX ADMIN — Medication 650 MILLIGRAM(S): at 17:18

## 2021-07-22 RX ADMIN — Medication 650 MILLIGRAM(S): at 18:26

## 2021-07-22 RX ADMIN — Medication 1 TABLET(S): at 13:11

## 2021-07-22 RX ADMIN — Medication 1 APPLICATORFUL: at 22:16
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
## 2021-07-22 NOTE — PROGRESS NOTE BEHAVIORAL HEALTH - SUMMARY
22 yo single, unemployed, domicile (for 2 months with mother, previously homeless x 2 years), 14 weeks pregnant female with past psychiatric history including 8 previous psychiatric hospitalizations (last in 2018), 4 prior suicide attempts (all resulting in no physical harm but resulted in psychiatric admission), last therapist appt in 2020, diagnosed with borderline personality disorder, MDD, PTSD, ADHD, anxiety with pmhx scoliosis, PCOS, current yeast infection (on day 3/7 Monostat) BIBS and admitted voluntarily to 8 uris.    Pt doing well today, experiencing some mild GI discomfort, likely 2/2 Zoloft. Woke up several times through the night despite taking Melatonin. Discharge focused and planning for discharge tomorrow.
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
## 2021-07-22 NOTE — PROGRESS NOTE BEHAVIORAL HEALTH - NSBHFUPINTERVALHXFT_PSY_A_CORE
Pt seen on the unit, alert and cooperative, interacting with patients and attending groups. Pt states that she took Melatonin last night to help with sleep but she still found herself waking up periodically through the night, and now this morning still feels a bit drowsy. States she sleeps better at home and is excited to be discharged tomorrow and get some good sleep. Pt did not get her am dose of Zofran this morning and because of this not able to eat breakfast, states the Zofran helps her for lunch and dinner but she is still just picking at her food. Pt doing well with Zoloft 25mg dosage, but has been noticing some GI upset, unsure if it is 2/2 Zoloft or just cramping. States her yeast infection is improving and she is responding well to the cream. Pt was able to speak with someone at the [facility] but they do not take Medicaid. Pt discharge focused and looking forward to getting on a wait list for outpatient therapy and is ready to initiate this and get the help she needs.  Denies suicidal ideations, a/v hallucinations, thoughts of paranoia, delusions.

## 2021-07-22 NOTE — PROGRESS NOTE BEHAVIORAL HEALTH - NSBHADMITCOUNSEL_PSY_A_CORE
diagnostic results/impressions and/or recommended studies/risks and benefits of treatment options/instructions for management, treatment and follow up/importance of adherence to chosen treatment/risk factor reduction/client/family/caregiver education/prognosis
diagnostic results/impressions and/or recommended studies/risks and benefits of treatment options/instructions for management, treatment and follow up/importance of adherence to chosen treatment/risk factor reduction/client/family/caregiver education/prognosis/other...

## 2021-07-22 NOTE — PROGRESS NOTE ADULT - ASSESSMENT
22yo  at 14+0 by 1st TM US (last LMP unknown, SKINNY 22) with PCOS, MDD, and borderline personality disorder with hx of multiple suicide attempts, currently being admitted for SI.  -VSS  -Pt has close outpt OB followup and seems to be very compliant with her appointments and care, if she would prefer to come to us for care, can can make an appointment at: [facility], [address] [place] [address], [phone].  - OB TVUS WNL  - +FH today   Discussed with Dr. Beck 20yo  at 14+3wks by 1st trimester US (last LMP unknown, SKINNY 22) with PCOS, MDD, and borderline personality disorder with hx of multiple suicide attempts, currently admitted for SI.  -VSS, asymptomatic, no OB complaints at this time  -Pt has close outpt OB followup and seems to be very compliant with her appointments and care. Discussed her need for anatomy scan at 16wks and her GTT test. Pt verbalized understanding  - If she would prefer to come to us for care, she can make an appointment at: [facility], [address] [place] [address], [phone].  - OB TVUS WNL from   - +FH [phone]) today   - OB to sign-off, reconsult at any time  Discussed with Dr. Beck

## 2021-07-23 VITALS
HEART RATE: 89 BPM | SYSTOLIC BLOOD PRESSURE: 117 MMHG | OXYGEN SATURATION: 100 % | TEMPERATURE: 98 F | DIASTOLIC BLOOD PRESSURE: 76 MMHG | RESPIRATION RATE: 18 BRPM

## 2021-07-23 PROCEDURE — 81001 URINALYSIS AUTO W/SCOPE: CPT

## 2021-07-23 PROCEDURE — 36415 COLL VENOUS BLD VENIPUNCTURE: CPT

## 2021-07-23 PROCEDURE — 80053 COMPREHEN METABOLIC PANEL: CPT

## 2021-07-23 PROCEDURE — 83036 HEMOGLOBIN GLYCOSYLATED A1C: CPT

## 2021-07-23 PROCEDURE — 86769 SARS-COV-2 COVID-19 ANTIBODY: CPT

## 2021-07-23 PROCEDURE — 80061 LIPID PANEL: CPT

## 2021-07-23 PROCEDURE — 99285 EMERGENCY DEPT VISIT HI MDM: CPT | Mod: 25

## 2021-07-23 PROCEDURE — 85027 COMPLETE CBC AUTOMATED: CPT

## 2021-07-23 PROCEDURE — 85025 COMPLETE CBC W/AUTO DIFF WBC: CPT

## 2021-07-23 PROCEDURE — 99232 SBSQ HOSP IP/OBS MODERATE 35: CPT | Mod: GC

## 2021-07-23 PROCEDURE — U0005: CPT

## 2021-07-23 PROCEDURE — U0003: CPT

## 2021-07-23 PROCEDURE — 84443 ASSAY THYROID STIM HORMONE: CPT

## 2021-07-23 PROCEDURE — 76805 OB US >/= 14 WKS SNGL FETUS: CPT

## 2021-07-23 PROCEDURE — 84702 CHORIONIC GONADOTROPIN TEST: CPT

## 2021-07-23 PROCEDURE — 81025 URINE PREGNANCY TEST: CPT

## 2021-07-23 PROCEDURE — 80307 DRUG TEST PRSMV CHEM ANLYZR: CPT

## 2021-07-23 PROCEDURE — 76817 TRANSVAGINAL US OBSTETRIC: CPT

## 2021-07-23 RX ORDER — LANOLIN ALCOHOL/MO/W.PET/CERES
1 CREAM (GRAM) TOPICAL
Qty: 30 | Refills: 0
Start: 2021-07-23 | End: 2021-08-21

## 2021-07-23 RX ORDER — SERTRALINE 25 MG/1
1 TABLET, FILM COATED ORAL
Qty: 30 | Refills: 0
Start: 2021-07-23 | End: 2021-08-21

## 2021-07-23 RX ORDER — ONDANSETRON 8 MG/1
1 TABLET, FILM COATED ORAL
Qty: 60 | Refills: 0
Start: 2021-07-23 | End: 2021-09-20

## 2021-07-23 RX ORDER — ONDANSETRON 8 MG/1
1 TABLET, FILM COATED ORAL
Qty: 30 | Refills: 0
Start: 2021-07-23 | End: 2021-08-21

## 2021-07-23 RX ADMIN — Medication 1 TABLET(S): at 12:57

## 2021-07-23 RX ADMIN — SERTRALINE 25 MILLIGRAM(S): 25 TABLET, FILM COATED ORAL at 12:57

## 2021-07-23 RX ADMIN — ONDANSETRON 4 MILLIGRAM(S): 8 TABLET, FILM COATED ORAL at 11:49

## 2021-07-23 NOTE — PROGRESS NOTE BEHAVIORAL HEALTH - NSBHFUPINTERVALHXFT_PSY_A_CORE
Pt seen on the unit, alert and cooperative, interacting with patients and attending groups. Patient is looking forward to discharge and attending the motherhood program until she can be accepted to [facility]. She reports taking Zofran PRN about twice a day for nausea. Denies side effects from Zoloft. Denies SI/HI, says she has not been suicidal since she got on the unit. Denies AVH. Has completed Monistat cream and says yeast infection is resolved. Pt seen on the unit, alert and cooperative, interacting with patients and attending groups. Patient is looking forward to discharge and attending the motherhood program until she can be accepted to [facility]. She reports taking Zofran PRN about twice a day for nausea. Denies side effects from Zoloft. Denies SI/HI, says she has not been suicidal since she got on the unit. Denies AVH. Has completed Monistat cream and says yeast infection is resolved.    Patient seen by attending Dr. Liu today. She was counseled on possible side effects of Zoloft including serotonin syndrome. She elected not to go up to 50 mg daily of Zoloft to treat her mood symptoms as it would require an extra day on the unit for observation. She was advised to begin taking fish oil which helps for healthy development of fetus and can also help with mood.

## 2021-07-23 NOTE — PROGRESS NOTE BEHAVIORAL HEALTH - NSBHFUPINTERVALCCFT_PSY_A_CORE
"I'm very upset right now after talking to my "
"I'm ok"
ongoing care for suicidal ideation and depression

## 2021-07-23 NOTE — PROGRESS NOTE BEHAVIORAL HEALTH - CASE SUMMARY
Background:  22 yo single, unemployed, domicile (for 2 months with mother, previously homeless x 2 years), 14 weeks pregnant female with past psychiatric history including 8 previous psychiatric hospitalizations (last in 2018), 4 prior suicide attempts (all resulting in no physical harm but resulted in psychiatric admission), substance abuse with one admission to rehab in 2017 (for alcohol and cannabis), recent cannabis abuse(last use 8 weeks ago), last therapist appt in 2020, diagnosed with borderline personality disorder, MDD, PTSD, ADHD, anxiety with pmhx scoliosis, PCOS, current yeast infection (on day 3/7 Monostat) BIBS and admitted voluntarily to 8 uris. ;Pt states that over the past week, her depression was worsening with increasing thoughts of suicide. Since becoming pregnant she states it has been hard and intense for her with the increase of hormones. On Sunday 7/18 pt states that she was having increasing thoughts of suicidal ideation and that she had a plan to kill herself by walking into traffic. Pt states that her increasing need to sleep throughout the day Sunday prevented her from actually initiating her plan. Pt states her mother was able to notice her triggers and see her depression worsen and suggested that she give it a try at the hospital and see if admission helps improve her mood. Pt was agreeable to get admitted to the hospital. Pt states prior to finding out she was pregnant she was living on the streets with her boyfriend for the past 2 years and once she found out she was pregnant she [place]. Pt doesn't admit to being close with her family, she tried to keep her distance and states "home is a trap" and that she is told that she "doesn't take responsibilities on that she should". Pt believes that she can take care of herself on her own since she has been living on her own for the past two years prior to moving in with her mom. States her mom made her make a decision between getting medical care for the baby and support from family or living on the street with no support, so she made the decision to move in with her mom. Pt states that she can get aggressive and has "outbursts" described as getting aggressive and throwing stuff at the staff members at her last hospitalization in 2018. Denies any aggression towards other patients. Also endorses aggression and arguments with her mother and sister. States this occurs when she gets shut out by other people, feels disrespected and when people don't explain things to her is when it triggers her aggression. Pt was taking psychiatric meds but stopped about a year ago as she was tired of taking medications and felt as if they weren't working for her. She also states that she felt as if her mom was pushing her to get meds and get her to "fix herself", pt feels as if her mom was always pushing admission to hospitals on her as well. Pt saw a therapist virtually during quarantine in 2020 but did not like speaking to someone virtually and ended her session prematurely. When asked about her goals of care here pt states that she wants to possibly get on medication, get a therapist, and learn about how to manage her disorder while being a mother. She doesn't know anything about parenting and states that being pregnant she tends to disassociate and forget at times that she is pregnant. States she went through thoughts of whether to abort the child and states that the only time she feels connected to the unborn baby is when she gets ultrasounds and is able to actually see that there is a child in her stomach since she is not showing at this time.   ;;07/23: patient has no SI ; is fuure oriented; good adls; can be discharged today on patient request. 3DL.  No evidence of suspiciousness or strange thoughts.   Alert; oriented; no s/h i/i/p or avh; future oriented planning to visit friends who are new mothers;  no pain issues; sleep appetite ok.   Counselled regarding possible SEs of Zoloft and issue of safety in pregnancy; encouraged to take MVIs and consider Omega for mood stability.   Patient wanted increase of Zoloft to 50mg a day but would not stay additional time for observation of effect.
Discussed why conversation with  was so upsetting. Discussed how she hates to feel constricted or trapped. Pt does not seem depressed to me and no SI. Pt irritable about unit rules and any intimation she will be kept beyond the weekend. Pt has been attending groups and interacting with pthers on the unit. Asked for me to consult OB which we were already planning on.

## 2021-07-23 NOTE — PROGRESS NOTE BEHAVIORAL HEALTH - PROBLEM SELECTOR PLAN 3
-Continue Clotramizole Cream daily (on day 1/3)
-Continue Clotramizole Cream daily (on day 1/3)
monitoring; tx sxs  e.g. nausea

## 2021-07-23 NOTE — PROGRESS NOTE BEHAVIORAL HEALTH - NSBHCHARTREVIEWVS_PSY_A_CORE FT
Vital Signs Last 24 Hrs  T(C): 36.9 (20 Jul 2021 16:41), Max: 36.9 (20 Jul 2021 16:41)  T(F): 98.5 (20 Jul 2021 16:41), Max: 98.5 (20 Jul 2021 16:41)  HR: 88 (20 Jul 2021 16:41) (88 - 88)  BP: 131/82 (20 Jul 2021 16:41) (131/82 - 131/82)  BP(mean): --  RR: 18 (20 Jul 2021 16:41) (18 - 18)  SpO2: 100% (20 Jul 2021 16:41) (100% - 100%)
Vital Signs Last 24 Hrs  T(C): 36.8 (22 Jul 2021 17:00), Max: 36.8 (22 Jul 2021 10:02)  T(F): 98.3 (22 Jul 2021 17:00), Max: 98.3 (22 Jul 2021 17:00)  HR: 91 (22 Jul 2021 17:00) (82 - 91)  BP: 129/82 (22 Jul 2021 19:15) (123/71 - 173/74)  BP(mean): --  RR: 19 (22 Jul 2021 17:00) (19 - 20)  SpO2: 98% (22 Jul 2021 17:00) (98% - 100%)

## 2021-07-23 NOTE — PROGRESS NOTE BEHAVIORAL HEALTH - SUMMARY
22 yo single, unemployed, domicile (for 2 months with mother, previously homeless x 2 years), 14 weeks pregnant female with past psychiatric history including 8 previous psychiatric hospitalizations (last in 2018), 4 prior suicide attempts (all resulting in no physical harm but resulted in psychiatric admission), last therapist appt in 2020, diagnosed with borderline personality disorder, MDD, PTSD, ADHD, anxiety with pmhx scoliosis, PCOS, current yeast infection (on day 3/7 Monostat) BIBS and admitted voluntarily to 8 uris.    Pt doing well today, experiencing some mild GI discomfort, likely 2/2 Zoloft. Woke up several times through the night despite taking Melatonin. Discharge focused and planning for discharge tomorrow. 20 yo single, unemployed, domicile (for 2 months with mother, previously homeless x 2 years), 14 weeks pregnant female with past psychiatric history including 8 previous psychiatric hospitalizations (last in 2018), 4 prior suicide attempts (all resulting in no physical harm but resulted in psychiatric admission), last therapist appt in 2020, diagnosed with borderline personality disorder, MDD, PTSD, ADHD, anxiety with pmhx scoliosis, PCOS, BIBS and admitted voluntarily to 8 uris.    Patient to be discharged today. She is protected by her pregnancy, support of family, current compliance with medication and plan for outpatient f/u at [facility]. She is at risk due to past suicide attempts and several past psychiatric admissions, and medical issues including PCOS and daily nausea due to pregnancy.

## 2021-07-23 NOTE — PROGRESS NOTE BEHAVIORAL HEALTH - RISK ASSESSMENT
Static: past psychiatric history and diagnoses, previous suicide attempts  Modifiable: relationships with others  Protective: pregnancy and motherhood instincts, family support system

## 2021-07-24 DIAGNOSIS — Z34.90 ENCOUNTER FOR SUPERVISION OF NORMAL PREGNANCY, UNSPECIFIED, UNSPECIFIED TRIMESTER: ICD-10-CM

## 2021-07-24 NOTE — CHART NOTE - NSCHARTNOTEFT_GEN_A_CORE
Pt's mother called me regarding discharge concerns. I explained I was out sick and a different covering doctor would be handling her discharge. She had concerns about the dose and quantity of zoloft prescribed on discharge but I explained this would be handled by the discharging doctor and what he felt comfortable with. I also explained that she could discuss increase of zoloft dose with provider at her intake appointment. I also explained I did not know the details of the discharge as I was not there due to illness.

## 2021-08-04 ENCOUNTER — APPOINTMENT (OUTPATIENT)
Dept: ANTEPARTUM | Facility: CLINIC | Age: 21
End: 2021-08-04
Payer: MEDICAID

## 2021-08-04 ENCOUNTER — ASOB RESULT (OUTPATIENT)
Age: 21
End: 2021-08-04

## 2021-08-04 PROCEDURE — 76805 OB US >/= 14 WKS SNGL FETUS: CPT

## 2021-08-04 PROCEDURE — 76817 TRANSVAGINAL US OBSTETRIC: CPT

## 2021-08-30 ENCOUNTER — APPOINTMENT (OUTPATIENT)
Dept: OBGYN | Facility: CLINIC | Age: 21
End: 2021-08-30
Payer: MEDICAID

## 2021-08-30 ENCOUNTER — NON-APPOINTMENT (OUTPATIENT)
Age: 21
End: 2021-08-30

## 2021-08-30 VITALS
HEART RATE: 102 BPM | DIASTOLIC BLOOD PRESSURE: 70 MMHG | SYSTOLIC BLOOD PRESSURE: 110 MMHG | BODY MASS INDEX: 30.66 KG/M2 | WEIGHT: 207 LBS | HEIGHT: 69 IN

## 2021-08-30 DIAGNOSIS — Z86.19 PERSONAL HISTORY OF OTHER INFECTIOUS AND PARASITIC DISEASES: ICD-10-CM

## 2021-08-30 DIAGNOSIS — J45.909 UNSPECIFIED ASTHMA, UNCOMPLICATED: ICD-10-CM

## 2021-08-30 PROCEDURE — 0500F INITIAL PRENATAL CARE VISIT: CPT

## 2021-08-30 NOTE — ED BEHAVIORAL HEALTH ASSESSMENT NOTE - CURRENT PASSIVE IDEATION:
Reason for call:  Patient reporting a symptom    Symptom or request: Increased SOB with any Activity. New cough, Fatigued, pt has Green mucus  Pt is asking if she can get a Antibiotic started today. This started a while ago and treated with Z Jesus. The symptoms seem to be getting worse.  Pt does have a Virtual Appt with Dr. Peters 8/30/21     Phone Number patient can be reached at:  Other phone number:  [place] Grand Daughter [phone]  Pt is with Granddaughter *    Best Time:  Any Time      Can we leave a detailed message on this number:  YES    Call taken on 8/30/2021 at 2:36 PM by Samantha Cardenas     Yes

## 2021-08-30 NOTE — HISTORY OF PRESENT ILLNESS
[LMP unknown] : LMP unknown [N] : Patient does not use contraception [Monogamous (Male Partner)] : is monogamous with a male partner [Y] : Positive pregnancy history [unknown] : Patient is unsure of the date of her LMP [Menarche Age: ____] : age at menarche was [unfilled] [Previously active] : previously active [Men] : men [PGxTotal] : 1

## 2021-09-07 ENCOUNTER — ASOB RESULT (OUTPATIENT)
Age: 21
End: 2021-09-07

## 2021-09-07 ENCOUNTER — NON-APPOINTMENT (OUTPATIENT)
Age: 21
End: 2021-09-07

## 2021-09-07 ENCOUNTER — APPOINTMENT (OUTPATIENT)
Dept: ANTEPARTUM | Facility: CLINIC | Age: 21
End: 2021-09-07
Payer: MEDICAID

## 2021-09-07 LAB
2ND TRIMESTER DATA: NORMAL
ADDENDUM DOC: NORMAL
AFP PNL SERPL: NORMAL
AFP SERPL-ACNC: NORMAL
B-HCG FREE SERPL-MCNC: NORMAL
CLINICAL BIOCHEMIST REVIEW: ABNORMAL
CLINICAL BIOCHEMIST REVIEW: NORMAL
CLINICAL BIOCHEMIST REVIEW: NORMAL
HERPES SIMPLEX 1 DNA: NOT DETECTED
HERPES SIMPLEX 2 DNA: DETECTED
HERPES SIMPLEX SPECIMEN SOURCE: NORMAL
INHIBIN A SERPL-MCNC: NORMAL
Lab: NORMAL
NOTES NTD: NORMAL
U ESTRIOL SERPL-SCNC: NORMAL

## 2021-09-07 PROCEDURE — 76816 OB US FOLLOW-UP PER FETUS: CPT

## 2021-09-07 PROCEDURE — 76817 TRANSVAGINAL US OBSTETRIC: CPT

## 2021-09-07 RX ORDER — VALACYCLOVIR 500 MG/1
500 TABLET, FILM COATED ORAL TWICE DAILY
Qty: 6 | Refills: 0 | Status: DISCONTINUED | COMMUNITY
Start: 2021-09-07 | End: 2021-09-07

## 2021-09-08 ENCOUNTER — NON-APPOINTMENT (OUTPATIENT)
Age: 21
End: 2021-09-08

## 2021-09-20 ENCOUNTER — NON-APPOINTMENT (OUTPATIENT)
Age: 21
End: 2021-09-20

## 2021-09-20 ENCOUNTER — APPOINTMENT (OUTPATIENT)
Dept: OBGYN | Facility: CLINIC | Age: 21
End: 2021-09-20
Payer: MEDICAID

## 2021-09-20 VITALS — DIASTOLIC BLOOD PRESSURE: 72 MMHG | OXYGEN SATURATION: 97 % | SYSTOLIC BLOOD PRESSURE: 116 MMHG | WEIGHT: 212 LBS

## 2021-09-20 PROCEDURE — 0502F SUBSEQUENT PRENATAL CARE: CPT

## 2021-09-21 ENCOUNTER — APPOINTMENT (OUTPATIENT)
Dept: ANTEPARTUM | Facility: CLINIC | Age: 21
End: 2021-09-21

## 2021-10-18 ENCOUNTER — APPOINTMENT (OUTPATIENT)
Dept: OBGYN | Facility: CLINIC | Age: 21
End: 2021-10-18
Payer: MEDICAID

## 2021-10-18 VITALS
HEIGHT: 69 IN | SYSTOLIC BLOOD PRESSURE: 110 MMHG | BODY MASS INDEX: 32.88 KG/M2 | OXYGEN SATURATION: 98 % | DIASTOLIC BLOOD PRESSURE: 64 MMHG | WEIGHT: 222 LBS

## 2021-10-18 DIAGNOSIS — A59.01 TRICHOMONAL VULVOVAGINITIS: ICD-10-CM

## 2021-10-18 PROCEDURE — 81003 URINALYSIS AUTO W/O SCOPE: CPT | Mod: QW

## 2021-10-18 PROCEDURE — 0502F SUBSEQUENT PRENATAL CARE: CPT

## 2021-10-18 PROCEDURE — 36415 COLL VENOUS BLD VENIPUNCTURE: CPT

## 2021-10-19 LAB — T PALLIDUM AB SER QL IA: NEGATIVE

## 2021-10-20 LAB — GLUCOSE 1H P 50 G GLC PO SERPL-MCNC: 182 MG/DL

## 2021-10-22 ENCOUNTER — NON-APPOINTMENT (OUTPATIENT)
Age: 21
End: 2021-10-22

## 2021-10-25 ENCOUNTER — NON-APPOINTMENT (OUTPATIENT)
Age: 21
End: 2021-10-25

## 2021-10-26 ENCOUNTER — NON-APPOINTMENT (OUTPATIENT)
Age: 21
End: 2021-10-26

## 2021-10-26 ENCOUNTER — APPOINTMENT (OUTPATIENT)
Dept: ANTEPARTUM | Facility: CLINIC | Age: 21
End: 2021-10-26
Payer: MEDICAID

## 2021-10-26 ENCOUNTER — ASOB RESULT (OUTPATIENT)
Age: 21
End: 2021-10-26

## 2021-10-26 PROCEDURE — 76816 OB US FOLLOW-UP PER FETUS: CPT

## 2021-10-26 PROCEDURE — 76819 FETAL BIOPHYS PROFIL W/O NST: CPT

## 2021-11-02 ENCOUNTER — NON-APPOINTMENT (OUTPATIENT)
Age: 21
End: 2021-11-02

## 2021-11-02 ENCOUNTER — APPOINTMENT (OUTPATIENT)
Dept: OBGYN | Facility: CLINIC | Age: 21
End: 2021-11-02
Payer: MEDICAID

## 2021-11-02 VITALS — DIASTOLIC BLOOD PRESSURE: 60 MMHG | SYSTOLIC BLOOD PRESSURE: 110 MMHG | WEIGHT: 229 LBS | OXYGEN SATURATION: 97 %

## 2021-11-02 DIAGNOSIS — R00.2 PALPITATIONS: ICD-10-CM

## 2021-11-02 DIAGNOSIS — N76.0 ACUTE VAGINITIS: ICD-10-CM

## 2021-11-02 DIAGNOSIS — B96.89 ACUTE VAGINITIS: ICD-10-CM

## 2021-11-02 PROCEDURE — 0502F SUBSEQUENT PRENATAL CARE: CPT

## 2021-11-10 LAB
A VAGINAE DNA VAG QL NAA+PROBE: ABNORMAL
BASOPHILS # BLD AUTO: 0.04 K/UL
BASOPHILS NFR BLD AUTO: 0.5 %
BVAB2 DNA VAG QL NAA+PROBE: NORMAL
C KRUSEI DNA VAG QL NAA+PROBE: NEGATIVE
C TRACH RRNA SPEC QL NAA+PROBE: NEGATIVE
EOSINOPHIL # BLD AUTO: 0.23 K/UL
EOSINOPHIL NFR BLD AUTO: 2.9 %
HCT VFR BLD CALC: 34.1 %
HGB BLD-MCNC: 10.9 G/DL
IMM GRANULOCYTES NFR BLD AUTO: 0.7 %
LYMPHOCYTES # BLD AUTO: 2.22 K/UL
LYMPHOCYTES NFR BLD AUTO: 27.7 %
MAN DIFF?: NORMAL
MCHC RBC-ENTMCNC: 31.1 PG
MCHC RBC-ENTMCNC: 32 GM/DL
MCV RBC AUTO: 97.4 FL
MEGA1 DNA VAG QL NAA+PROBE: ABNORMAL
MONOCYTES # BLD AUTO: 0.44 K/UL
MONOCYTES NFR BLD AUTO: 5.5 %
N GONORRHOEA RRNA SPEC QL NAA+PROBE: NEGATIVE
NEUTROPHILS # BLD AUTO: 5.03 K/UL
NEUTROPHILS NFR BLD AUTO: 62.7 %
PLATELET # BLD AUTO: 275 K/UL
RBC # BLD: 3.5 M/UL
RBC # FLD: 12.8 %
T VAGINALIS RRNA SPEC QL NAA+PROBE: NEGATIVE
WBC # FLD AUTO: 8.02 K/UL

## 2021-11-11 ENCOUNTER — APPOINTMENT (OUTPATIENT)
Dept: OBGYN | Facility: CLINIC | Age: 21
End: 2021-11-11
Payer: MEDICAID

## 2021-11-11 PROCEDURE — 0502F SUBSEQUENT PRENATAL CARE: CPT

## 2021-11-11 PROCEDURE — 36415 COLL VENOUS BLD VENIPUNCTURE: CPT

## 2021-11-12 LAB
GLUCOSE 1H P 100 G GLC PO SERPL-MCNC: 158 MG/DL
GLUCOSE 2H P CHAL SERPL-MCNC: 140 MG/DL
GLUCOSE 3H P CHAL SERPL-MCNC: 90 MG/DL
GLUCOSE BS SERPL-MCNC: 85 MG/DL

## 2021-11-16 ENCOUNTER — NON-APPOINTMENT (OUTPATIENT)
Age: 21
End: 2021-11-16

## 2021-11-18 ENCOUNTER — APPOINTMENT (OUTPATIENT)
Dept: OBGYN | Facility: CLINIC | Age: 21
End: 2021-11-18
Payer: MEDICAID

## 2021-11-18 VITALS
DIASTOLIC BLOOD PRESSURE: 80 MMHG | WEIGHT: 230 LBS | BODY MASS INDEX: 34.07 KG/M2 | HEIGHT: 69 IN | SYSTOLIC BLOOD PRESSURE: 120 MMHG

## 2021-11-18 PROCEDURE — 90471 IMMUNIZATION ADMIN: CPT

## 2021-11-18 PROCEDURE — 0502F SUBSEQUENT PRENATAL CARE: CPT

## 2021-11-18 PROCEDURE — 90715 TDAP VACCINE 7 YRS/> IM: CPT

## 2021-11-23 ENCOUNTER — OUTPATIENT (OUTPATIENT)
Dept: OUTPATIENT SERVICES | Facility: HOSPITAL | Age: 21
LOS: 1 days | End: 2021-11-23
Payer: COMMERCIAL

## 2021-11-23 DIAGNOSIS — O26.899 OTHER SPECIFIED PREGNANCY RELATED CONDITIONS, UNSPECIFIED TRIMESTER: ICD-10-CM

## 2021-11-23 DIAGNOSIS — Z3A.00 WEEKS OF GESTATION OF PREGNANCY NOT SPECIFIED: ICD-10-CM

## 2021-11-23 PROCEDURE — 99214 OFFICE O/P EST MOD 30 MIN: CPT

## 2021-11-24 ENCOUNTER — APPOINTMENT (OUTPATIENT)
Dept: ANTEPARTUM | Facility: CLINIC | Age: 21
End: 2021-11-24
Payer: MEDICAID

## 2021-11-24 ENCOUNTER — ASOB RESULT (OUTPATIENT)
Age: 21
End: 2021-11-24

## 2021-11-24 PROCEDURE — 76816 OB US FOLLOW-UP PER FETUS: CPT

## 2021-11-29 ENCOUNTER — NON-APPOINTMENT (OUTPATIENT)
Age: 21
End: 2021-11-29

## 2021-11-29 ENCOUNTER — APPOINTMENT (OUTPATIENT)
Dept: OBGYN | Facility: CLINIC | Age: 21
End: 2021-11-29
Payer: MEDICAID

## 2021-11-29 VITALS
SYSTOLIC BLOOD PRESSURE: 120 MMHG | HEIGHT: 69 IN | WEIGHT: 233 LBS | DIASTOLIC BLOOD PRESSURE: 80 MMHG | BODY MASS INDEX: 34.51 KG/M2

## 2021-11-29 DIAGNOSIS — R23.3 SPONTANEOUS ECCHYMOSES: ICD-10-CM

## 2021-11-29 DIAGNOSIS — Z23 ENCOUNTER FOR IMMUNIZATION: ICD-10-CM

## 2021-11-29 DIAGNOSIS — Z34.93 ENCOUNTER FOR SUPERVISION OF NORMAL PREGNANCY, UNSPECIFIED, THIRD TRIMESTER: ICD-10-CM

## 2021-11-29 PROCEDURE — 0502F SUBSEQUENT PRENATAL CARE: CPT

## 2021-11-30 LAB
ALBUMIN SERPL ELPH-MCNC: 3.4 G/DL
ALP BLD-CCNC: 102 U/L
ALT SERPL-CCNC: 18 U/L
ANION GAP SERPL CALC-SCNC: 16 MMOL/L
AST SERPL-CCNC: 13 U/L
BASOPHILS # BLD AUTO: 0.04 K/UL
BASOPHILS NFR BLD AUTO: 0.4 %
BILIRUB SERPL-MCNC: <0.2 MG/DL
BUN SERPL-MCNC: 8 MG/DL
CALCIUM SERPL-MCNC: 9.2 MG/DL
CHLORIDE SERPL-SCNC: 101 MMOL/L
CO2 SERPL-SCNC: 19 MMOL/L
CREAT SERPL-MCNC: 0.55 MG/DL
EOSINOPHIL # BLD AUTO: 0.2 K/UL
EOSINOPHIL NFR BLD AUTO: 2 %
GLUCOSE SERPL-MCNC: 62 MG/DL
HCT VFR BLD CALC: 34.8 %
HGB BLD-MCNC: 11.4 G/DL
IMM GRANULOCYTES NFR BLD AUTO: 0.4 %
LYMPHOCYTES # BLD AUTO: 2.33 K/UL
LYMPHOCYTES NFR BLD AUTO: 23.6 %
MAN DIFF?: NORMAL
MCHC RBC-ENTMCNC: 30.5 PG
MCHC RBC-ENTMCNC: 32.8 GM/DL
MCV RBC AUTO: 93 FL
MONOCYTES # BLD AUTO: 0.75 K/UL
MONOCYTES NFR BLD AUTO: 7.6 %
NEUTROPHILS # BLD AUTO: 6.51 K/UL
NEUTROPHILS NFR BLD AUTO: 66 %
PLATELET # BLD AUTO: 299 K/UL
POTASSIUM SERPL-SCNC: 4.6 MMOL/L
PROT SERPL-MCNC: 6 G/DL
RBC # BLD: 3.74 M/UL
RBC # FLD: 13 %
SODIUM SERPL-SCNC: 137 MMOL/L
T PALLIDUM AB SER QL IA: NEGATIVE
URATE SERPL-MCNC: 3 MG/DL
WBC # FLD AUTO: 9.87 K/UL

## 2021-12-14 ENCOUNTER — NON-APPOINTMENT (OUTPATIENT)
Age: 21
End: 2021-12-14

## 2021-12-14 ENCOUNTER — APPOINTMENT (OUTPATIENT)
Dept: OBGYN | Facility: CLINIC | Age: 21
End: 2021-12-14
Payer: MEDICAID

## 2021-12-14 VITALS
HEIGHT: 69 IN | SYSTOLIC BLOOD PRESSURE: 104 MMHG | WEIGHT: 239 LBS | OXYGEN SATURATION: 98 % | BODY MASS INDEX: 35.4 KG/M2 | DIASTOLIC BLOOD PRESSURE: 60 MMHG

## 2021-12-14 DIAGNOSIS — Z34.90 ENCOUNTER FOR SUPERVISION OF NORMAL PREGNANCY, UNSPECIFIED, UNSPECIFIED TRIMESTER: ICD-10-CM

## 2021-12-14 DIAGNOSIS — Z86.59 PERSONAL HISTORY OF OTHER MENTAL AND BEHAVIORAL DISORDERS: ICD-10-CM

## 2021-12-14 PROCEDURE — 0502F SUBSEQUENT PRENATAL CARE: CPT

## 2021-12-21 ENCOUNTER — APPOINTMENT (OUTPATIENT)
Dept: OBGYN | Facility: CLINIC | Age: 21
End: 2021-12-21
Payer: MEDICAID

## 2021-12-21 ENCOUNTER — NON-APPOINTMENT (OUTPATIENT)
Age: 21
End: 2021-12-21

## 2021-12-21 VITALS
BODY MASS INDEX: 35.55 KG/M2 | DIASTOLIC BLOOD PRESSURE: 68 MMHG | SYSTOLIC BLOOD PRESSURE: 100 MMHG | HEIGHT: 69 IN | WEIGHT: 240 LBS | OXYGEN SATURATION: 99 %

## 2021-12-21 DIAGNOSIS — K64.0 FIRST DEGREE HEMORRHOIDS: ICD-10-CM

## 2021-12-21 PROCEDURE — 0502F SUBSEQUENT PRENATAL CARE: CPT

## 2021-12-22 ENCOUNTER — ASOB RESULT (OUTPATIENT)
Age: 21
End: 2021-12-22

## 2021-12-22 ENCOUNTER — APPOINTMENT (OUTPATIENT)
Dept: ANTEPARTUM | Facility: CLINIC | Age: 21
End: 2021-12-22
Payer: MEDICAID

## 2021-12-22 PROCEDURE — 76816 OB US FOLLOW-UP PER FETUS: CPT

## 2021-12-28 ENCOUNTER — NON-APPOINTMENT (OUTPATIENT)
Age: 21
End: 2021-12-28

## 2021-12-28 ENCOUNTER — APPOINTMENT (OUTPATIENT)
Dept: OBGYN | Facility: CLINIC | Age: 21
End: 2021-12-28
Payer: MEDICAID

## 2021-12-28 VITALS
DIASTOLIC BLOOD PRESSURE: 80 MMHG | WEIGHT: 244 LBS | SYSTOLIC BLOOD PRESSURE: 120 MMHG | BODY MASS INDEX: 36.14 KG/M2 | OXYGEN SATURATION: 97 % | HEIGHT: 69 IN

## 2021-12-28 PROCEDURE — 99214 OFFICE O/P EST MOD 30 MIN: CPT

## 2022-01-04 ENCOUNTER — APPOINTMENT (OUTPATIENT)
Dept: OBGYN | Facility: CLINIC | Age: 22
End: 2022-01-04
Payer: MEDICAID

## 2022-01-04 ENCOUNTER — NON-APPOINTMENT (OUTPATIENT)
Age: 22
End: 2022-01-04

## 2022-01-04 VITALS
SYSTOLIC BLOOD PRESSURE: 118 MMHG | BODY MASS INDEX: 36.73 KG/M2 | DIASTOLIC BLOOD PRESSURE: 70 MMHG | HEIGHT: 69 IN | HEART RATE: 115 BPM | OXYGEN SATURATION: 97 % | WEIGHT: 248 LBS

## 2022-01-04 PROCEDURE — 0502F SUBSEQUENT PRENATAL CARE: CPT

## 2022-01-05 ENCOUNTER — ASOB RESULT (OUTPATIENT)
Age: 22
End: 2022-01-05

## 2022-01-05 ENCOUNTER — APPOINTMENT (OUTPATIENT)
Dept: ANTEPARTUM | Facility: CLINIC | Age: 22
End: 2022-01-05
Payer: MEDICAID

## 2022-01-05 PROCEDURE — 76819 FETAL BIOPHYS PROFIL W/O NST: CPT

## 2022-01-10 ENCOUNTER — NON-APPOINTMENT (OUTPATIENT)
Age: 22
End: 2022-01-10

## 2022-01-10 ENCOUNTER — APPOINTMENT (OUTPATIENT)
Dept: OBGYN | Facility: CLINIC | Age: 22
End: 2022-01-10
Payer: MEDICAID

## 2022-01-10 VITALS
DIASTOLIC BLOOD PRESSURE: 60 MMHG | SYSTOLIC BLOOD PRESSURE: 110 MMHG | WEIGHT: 250 LBS | HEIGHT: 69 IN | BODY MASS INDEX: 37.03 KG/M2 | OXYGEN SATURATION: 99 %

## 2022-01-10 PROCEDURE — 0502F SUBSEQUENT PRENATAL CARE: CPT

## 2022-01-19 ENCOUNTER — NON-APPOINTMENT (OUTPATIENT)
Age: 22
End: 2022-01-19

## 2022-01-19 ENCOUNTER — ASOB RESULT (OUTPATIENT)
Age: 22
End: 2022-01-19

## 2022-01-19 ENCOUNTER — APPOINTMENT (OUTPATIENT)
Dept: ANTEPARTUM | Facility: CLINIC | Age: 22
End: 2022-01-19
Payer: MEDICAID

## 2022-01-19 PROCEDURE — 76819 FETAL BIOPHYS PROFIL W/O NST: CPT

## 2022-01-19 PROCEDURE — 76816 OB US FOLLOW-UP PER FETUS: CPT

## 2022-01-20 ENCOUNTER — APPOINTMENT (OUTPATIENT)
Dept: OBGYN | Facility: CLINIC | Age: 22
End: 2022-01-20
Payer: MEDICAID

## 2022-01-20 ENCOUNTER — TRANSCRIPTION ENCOUNTER (OUTPATIENT)
Age: 22
End: 2022-01-20

## 2022-01-20 VITALS
DIASTOLIC BLOOD PRESSURE: 60 MMHG | OXYGEN SATURATION: 99 % | BODY MASS INDEX: 37.33 KG/M2 | SYSTOLIC BLOOD PRESSURE: 100 MMHG | WEIGHT: 252 LBS | HEIGHT: 69 IN

## 2022-01-20 PROCEDURE — 0502F SUBSEQUENT PRENATAL CARE: CPT

## 2022-01-21 ENCOUNTER — INPATIENT (INPATIENT)
Facility: HOSPITAL | Age: 22
LOS: 2 days | Discharge: HOME | End: 2022-01-24
Attending: OBSTETRICS & GYNECOLOGY | Admitting: OBSTETRICS & GYNECOLOGY
Payer: COMMERCIAL

## 2022-01-21 VITALS — WEIGHT: 250 LBS | HEIGHT: 69 IN

## 2022-01-21 LAB
ALBUMIN SERPL ELPH-MCNC: 3.6 G/DL — SIGNIFICANT CHANGE UP (ref 3.3–5)
ALP SERPL-CCNC: 179 U/L — HIGH (ref 40–120)
ALT FLD-CCNC: 11 U/L — SIGNIFICANT CHANGE UP (ref 10–45)
ANION GAP SERPL CALC-SCNC: 14 MMOL/L — SIGNIFICANT CHANGE UP (ref 5–17)
APTT BLD: 27.1 SEC — LOW (ref 27.5–35.5)
AST SERPL-CCNC: 13 U/L — SIGNIFICANT CHANGE UP (ref 10–40)
BASOPHILS # BLD AUTO: 0.02 K/UL — SIGNIFICANT CHANGE UP (ref 0–0.2)
BASOPHILS NFR BLD AUTO: 0.3 % — SIGNIFICANT CHANGE UP (ref 0–2)
BILIRUB SERPL-MCNC: 0.2 MG/DL — SIGNIFICANT CHANGE UP (ref 0.2–1.2)
BLD GP AB SCN SERPL QL: NEGATIVE — SIGNIFICANT CHANGE UP
BUN SERPL-MCNC: 6 MG/DL — LOW (ref 7–23)
CALCIUM SERPL-MCNC: 9.4 MG/DL — SIGNIFICANT CHANGE UP (ref 8.4–10.5)
CHLORIDE SERPL-SCNC: 106 MMOL/L — SIGNIFICANT CHANGE UP (ref 96–108)
CO2 SERPL-SCNC: 20 MMOL/L — LOW (ref 22–31)
CREAT ?TM UR-MCNC: 154 MG/DL — SIGNIFICANT CHANGE UP
CREAT SERPL-MCNC: 0.55 MG/DL — SIGNIFICANT CHANGE UP (ref 0.5–1.3)
EOSINOPHIL # BLD AUTO: 0.14 K/UL — SIGNIFICANT CHANGE UP (ref 0–0.5)
EOSINOPHIL NFR BLD AUTO: 1.9 % — SIGNIFICANT CHANGE UP (ref 0–6)
FIBRINOGEN PPP-MCNC: 442 MG/DL — HIGH (ref 258–438)
GLUCOSE SERPL-MCNC: 126 MG/DL — HIGH (ref 70–99)
HCT VFR BLD CALC: 35 % — SIGNIFICANT CHANGE UP (ref 34.5–45)
HGB BLD-MCNC: 11.6 G/DL — SIGNIFICANT CHANGE UP (ref 11.5–15.5)
IMM GRANULOCYTES NFR BLD AUTO: 0.5 % — SIGNIFICANT CHANGE UP (ref 0–1.5)
INR BLD: 0.96 — SIGNIFICANT CHANGE UP (ref 0.88–1.16)
LDH SERPL L TO P-CCNC: 145 U/L — SIGNIFICANT CHANGE UP (ref 50–242)
LYMPHOCYTES # BLD AUTO: 1.78 K/UL — SIGNIFICANT CHANGE UP (ref 1–3.3)
LYMPHOCYTES # BLD AUTO: 24.1 % — SIGNIFICANT CHANGE UP (ref 13–44)
MCHC RBC-ENTMCNC: 29.9 PG — SIGNIFICANT CHANGE UP (ref 27–34)
MCHC RBC-ENTMCNC: 33.1 GM/DL — SIGNIFICANT CHANGE UP (ref 32–36)
MCV RBC AUTO: 90.2 FL — SIGNIFICANT CHANGE UP (ref 80–100)
MONOCYTES # BLD AUTO: 0.52 K/UL — SIGNIFICANT CHANGE UP (ref 0–0.9)
MONOCYTES NFR BLD AUTO: 7 % — SIGNIFICANT CHANGE UP (ref 2–14)
NEUTROPHILS # BLD AUTO: 4.9 K/UL — SIGNIFICANT CHANGE UP (ref 1.8–7.4)
NEUTROPHILS NFR BLD AUTO: 66.2 % — SIGNIFICANT CHANGE UP (ref 43–77)
NRBC # BLD: 0 /100 WBCS — SIGNIFICANT CHANGE UP (ref 0–0)
PLATELET # BLD AUTO: 256 K/UL — SIGNIFICANT CHANGE UP (ref 150–400)
POTASSIUM SERPL-MCNC: 4.1 MMOL/L — SIGNIFICANT CHANGE UP (ref 3.5–5.3)
POTASSIUM SERPL-SCNC: 4.1 MMOL/L — SIGNIFICANT CHANGE UP (ref 3.5–5.3)
PROT ?TM UR-MCNC: 13 MG/DL — HIGH (ref 0–12)
PROT SERPL-MCNC: 6.3 G/DL — SIGNIFICANT CHANGE UP (ref 6–8.3)
PROT/CREAT UR-RTO: 0.1 RATIO — SIGNIFICANT CHANGE UP (ref 0–0.2)
PROTHROM AB SERPL-ACNC: 11.5 SEC — SIGNIFICANT CHANGE UP (ref 10.6–13.6)
RBC # BLD: 3.88 M/UL — SIGNIFICANT CHANGE UP (ref 3.8–5.2)
RBC # FLD: 13.9 % — SIGNIFICANT CHANGE UP (ref 10.3–14.5)
RH IG SCN BLD-IMP: POSITIVE — SIGNIFICANT CHANGE UP
RH IG SCN BLD-IMP: POSITIVE — SIGNIFICANT CHANGE UP
SARS-COV-2 RNA SPEC QL NAA+PROBE: SIGNIFICANT CHANGE UP
SODIUM SERPL-SCNC: 140 MMOL/L — SIGNIFICANT CHANGE UP (ref 135–145)
URATE SERPL-MCNC: 3.9 MG/DL — SIGNIFICANT CHANGE UP (ref 2.5–7)
WBC # BLD: 7.4 K/UL — SIGNIFICANT CHANGE UP (ref 3.8–10.5)
WBC # FLD AUTO: 7.4 K/UL — SIGNIFICANT CHANGE UP (ref 3.8–10.5)

## 2022-01-21 PROCEDURE — 59510 CESAREAN DELIVERY: CPT | Mod: U9

## 2022-01-21 RX ORDER — CEFAZOLIN SODIUM 1 G
2000 VIAL (EA) INJECTION ONCE
Refills: 0 | Status: DISCONTINUED | OUTPATIENT
Start: 2022-01-21 | End: 2022-01-22

## 2022-01-21 RX ORDER — SODIUM CHLORIDE 9 MG/ML
1000 INJECTION, SOLUTION INTRAVENOUS
Refills: 0 | Status: DISCONTINUED | OUTPATIENT
Start: 2022-01-21 | End: 2022-01-22

## 2022-01-21 RX ORDER — CITRIC ACID/SODIUM CITRATE 300-500 MG
30 SOLUTION, ORAL ORAL ONCE
Refills: 0 | Status: DISCONTINUED | OUTPATIENT
Start: 2022-01-21 | End: 2022-01-22

## 2022-01-21 RX ORDER — BUPIVACAINE 13.3 MG/ML
20 INJECTION, SUSPENSION, LIPOSOMAL INFILTRATION ONCE
Refills: 0 | Status: DISCONTINUED | OUTPATIENT
Start: 2022-01-21 | End: 2022-01-22

## 2022-01-21 RX ORDER — OXYTOCIN 10 UNIT/ML
333.33 VIAL (ML) INJECTION
Qty: 20 | Refills: 0 | Status: DISCONTINUED | OUTPATIENT
Start: 2022-01-21 | End: 2022-01-22

## 2022-01-21 RX ORDER — CHLORHEXIDINE GLUCONATE 213 G/1000ML
1 SOLUTION TOPICAL DAILY
Refills: 0 | Status: DISCONTINUED | OUTPATIENT
Start: 2022-01-21 | End: 2022-01-22

## 2022-01-21 RX ORDER — CITRIC ACID/SODIUM CITRATE 300-500 MG
15 SOLUTION, ORAL ORAL EVERY 6 HOURS
Refills: 0 | Status: DISCONTINUED | OUTPATIENT
Start: 2022-01-21 | End: 2022-01-22

## 2022-01-21 RX ADMIN — SODIUM CHLORIDE 125 MILLILITER(S): 9 INJECTION, SOLUTION INTRAVENOUS at 17:58

## 2022-01-21 NOTE — PATIENT PROFILE OB - PRO REFERRALS OB
20.00hrs on 1/21/22  - Mabel Butler contacted to consult with  patient re social history. DR Lara informed/social work/services

## 2022-01-21 NOTE — PATIENT PROFILE OB - MENTAL HEALTH CONDITIONS/SYMPTOMS, PROFILE
Borderline Personality Disorder( Multiple suicide attempts this pregnancy)/anxiety disorder/depression/suicide attempt Borderline Personality Disorder( Multiple suicide attempts this pregnancy)/none/anxiety disorder/depression/suicide attempt

## 2022-01-21 NOTE — PATIENT PROFILE OB - FALL HARM RISK - UNIVERSAL INTERVENTIONS
Bed in lowest position, wheels locked, appropriate side rails in place/Call bell, personal items and telephone in reach/Instruct patient to call for assistance before getting out of bed or chair/Non-slip footwear when patient is out of bed/[place] to call system/Physically safe environment - no spills, clutter or unnecessary equipment/Purposeful Proactive Rounding/Room/bathroom lighting operational, light cord in reach

## 2022-01-22 DIAGNOSIS — F60.3 BORDERLINE PERSONALITY DISORDER: ICD-10-CM

## 2022-01-22 LAB
COVID-19 SPIKE DOMAIN AB INTERP: POSITIVE
COVID-19 SPIKE DOMAIN ANTIBODY RESULT: >250 U/ML — HIGH
SARS-COV-2 IGG+IGM SERPL QL IA: >250 U/ML — HIGH
SARS-COV-2 IGG+IGM SERPL QL IA: POSITIVE
T PALLIDUM AB TITR SER: NEGATIVE — SIGNIFICANT CHANGE UP

## 2022-01-22 PROCEDURE — 11981 INSERTION DRUG DLVR IMPLANT: CPT

## 2022-01-22 DEVICE — INTERCEED 3 X 4": Type: IMPLANTABLE DEVICE | Status: FUNCTIONAL

## 2022-01-22 RX ORDER — SODIUM CHLORIDE 9 MG/ML
500 INJECTION, SOLUTION INTRAVENOUS ONCE
Refills: 0 | Status: COMPLETED | OUTPATIENT
Start: 2022-01-22 | End: 2022-01-22

## 2022-01-22 RX ORDER — MAGNESIUM HYDROXIDE 400 MG/1
30 TABLET, CHEWABLE ORAL
Refills: 0 | Status: DISCONTINUED | OUTPATIENT
Start: 2022-01-22 | End: 2022-01-24

## 2022-01-22 RX ORDER — OXYTOCIN 10 UNIT/ML
333.33 VIAL (ML) INJECTION
Qty: 20 | Refills: 0 | Status: DISCONTINUED | OUTPATIENT
Start: 2022-01-22 | End: 2022-01-24

## 2022-01-22 RX ORDER — OXYCODONE HYDROCHLORIDE 5 MG/1
5 TABLET ORAL ONCE
Refills: 0 | Status: DISCONTINUED | OUTPATIENT
Start: 2022-01-22 | End: 2022-01-24

## 2022-01-22 RX ORDER — ETONOGESTREL 68 MG/1
68 IMPLANT SUBCUTANEOUS ONCE
Refills: 0 | Status: COMPLETED | OUTPATIENT
Start: 2022-01-22 | End: 2022-01-22

## 2022-01-22 RX ORDER — DIPHENHYDRAMINE HCL 50 MG
25 CAPSULE ORAL EVERY 6 HOURS
Refills: 0 | Status: DISCONTINUED | OUTPATIENT
Start: 2022-01-22 | End: 2022-01-24

## 2022-01-22 RX ORDER — KETOROLAC TROMETHAMINE 30 MG/ML
30 SYRINGE (ML) INJECTION EVERY 6 HOURS
Refills: 0 | Status: DISCONTINUED | OUTPATIENT
Start: 2022-01-22 | End: 2022-01-23

## 2022-01-22 RX ORDER — ACETAMINOPHEN 500 MG
1000 TABLET ORAL ONCE
Refills: 0 | Status: DISCONTINUED | OUTPATIENT
Start: 2022-01-22 | End: 2022-01-24

## 2022-01-22 RX ORDER — MORPHINE SULFATE 50 MG/1
0.2 CAPSULE, EXTENDED RELEASE ORAL ONCE
Refills: 0 | Status: DISCONTINUED | OUTPATIENT
Start: 2022-01-22 | End: 2022-01-22

## 2022-01-22 RX ORDER — LIDOCAINE HCL 20 MG/ML
5 VIAL (ML) INJECTION ONCE
Refills: 0 | Status: COMPLETED | OUTPATIENT
Start: 2022-01-22 | End: 2022-01-22

## 2022-01-22 RX ORDER — NALOXONE HYDROCHLORIDE 4 MG/.1ML
0.1 SPRAY NASAL
Refills: 0 | Status: DISCONTINUED | OUTPATIENT
Start: 2022-01-22 | End: 2022-01-24

## 2022-01-22 RX ORDER — IBUPROFEN 200 MG
600 TABLET ORAL EVERY 6 HOURS
Refills: 0 | Status: COMPLETED | OUTPATIENT
Start: 2022-01-22 | End: 2022-12-21

## 2022-01-22 RX ORDER — TETANUS TOXOID, REDUCED DIPHTHERIA TOXOID AND ACELLULAR PERTUSSIS VACCINE, ADSORBED 5; 2.5; 8; 8; 2.5 [IU]/.5ML; [IU]/.5ML; UG/.5ML; UG/.5ML; UG/.5ML
0.5 SUSPENSION INTRAMUSCULAR ONCE
Refills: 0 | Status: DISCONTINUED | OUTPATIENT
Start: 2022-01-22 | End: 2022-01-24

## 2022-01-22 RX ORDER — KETOROLAC TROMETHAMINE 30 MG/ML
15 SYRINGE (ML) INJECTION EVERY 8 HOURS
Refills: 0 | Status: DISCONTINUED | OUTPATIENT
Start: 2022-01-22 | End: 2022-01-22

## 2022-01-22 RX ORDER — SODIUM CHLORIDE 9 MG/ML
1000 INJECTION, SOLUTION INTRAVENOUS
Refills: 0 | Status: DISCONTINUED | OUTPATIENT
Start: 2022-01-22 | End: 2022-01-24

## 2022-01-22 RX ORDER — FOLIC ACID 0.8 MG
1 TABLET ORAL DAILY
Refills: 0 | Status: DISCONTINUED | OUTPATIENT
Start: 2022-01-22 | End: 2022-01-24

## 2022-01-22 RX ORDER — ONDANSETRON 8 MG/1
4 TABLET, FILM COATED ORAL EVERY 6 HOURS
Refills: 0 | Status: DISCONTINUED | OUTPATIENT
Start: 2022-01-22 | End: 2022-01-22

## 2022-01-22 RX ORDER — HYDROMORPHONE HYDROCHLORIDE 2 MG/ML
0.5 INJECTION INTRAMUSCULAR; INTRAVENOUS; SUBCUTANEOUS
Refills: 0 | Status: DISCONTINUED | OUTPATIENT
Start: 2022-01-22 | End: 2022-01-22

## 2022-01-22 RX ORDER — LANOLIN
1 OINTMENT (GRAM) TOPICAL EVERY 6 HOURS
Refills: 0 | Status: DISCONTINUED | OUTPATIENT
Start: 2022-01-22 | End: 2022-01-24

## 2022-01-22 RX ORDER — ENOXAPARIN SODIUM 100 MG/ML
40 INJECTION SUBCUTANEOUS EVERY 12 HOURS
Refills: 0 | Status: DISCONTINUED | OUTPATIENT
Start: 2022-01-22 | End: 2022-01-24

## 2022-01-22 RX ORDER — SIMETHICONE 80 MG/1
80 TABLET, CHEWABLE ORAL EVERY 4 HOURS
Refills: 0 | Status: DISCONTINUED | OUTPATIENT
Start: 2022-01-22 | End: 2022-01-24

## 2022-01-22 RX ORDER — ACETAMINOPHEN 500 MG
975 TABLET ORAL EVERY 6 HOURS
Refills: 0 | Status: DISCONTINUED | OUTPATIENT
Start: 2022-01-22 | End: 2022-01-24

## 2022-01-22 RX ORDER — FERROUS SULFATE 325(65) MG
325 TABLET ORAL DAILY
Refills: 0 | Status: DISCONTINUED | OUTPATIENT
Start: 2022-01-22 | End: 2022-01-24

## 2022-01-22 RX ORDER — OXYCODONE HYDROCHLORIDE 5 MG/1
5 TABLET ORAL
Refills: 0 | Status: DISCONTINUED | OUTPATIENT
Start: 2022-01-22 | End: 2022-01-24

## 2022-01-22 RX ADMIN — Medication 975 MILLIGRAM(S): at 11:00

## 2022-01-22 RX ADMIN — MORPHINE SULFATE 0.2 MILLIGRAM(S): 50 CAPSULE, EXTENDED RELEASE ORAL at 01:39

## 2022-01-22 RX ADMIN — Medication 975 MILLIGRAM(S): at 21:45

## 2022-01-22 RX ADMIN — Medication 5 MILLILITER(S): at 11:29

## 2022-01-22 RX ADMIN — Medication 30 MILLIGRAM(S): at 17:10

## 2022-01-22 RX ADMIN — SODIUM CHLORIDE 1000 MILLILITER(S): 9 INJECTION, SOLUTION INTRAVENOUS at 03:27

## 2022-01-22 RX ADMIN — ENOXAPARIN SODIUM 40 MILLIGRAM(S): 100 INJECTION SUBCUTANEOUS at 11:55

## 2022-01-22 RX ADMIN — Medication 1 TABLET(S): at 10:14

## 2022-01-22 RX ADMIN — Medication 975 MILLIGRAM(S): at 10:15

## 2022-01-22 RX ADMIN — Medication 30 MILLIGRAM(S): at 11:55

## 2022-01-22 RX ADMIN — Medication 30 MILLIGRAM(S): at 12:30

## 2022-01-22 RX ADMIN — Medication 975 MILLIGRAM(S): at 14:13

## 2022-01-22 RX ADMIN — Medication 30 MILLIGRAM(S): at 01:16

## 2022-01-22 RX ADMIN — ETONOGESTREL 68 MILLIGRAM(S): 68 IMPLANT SUBCUTANEOUS at 11:29

## 2022-01-22 RX ADMIN — Medication 975 MILLIGRAM(S): at 21:11

## 2022-01-22 RX ADMIN — Medication 1 MILLIGRAM(S): at 10:14

## 2022-01-22 RX ADMIN — Medication 30 MILLIGRAM(S): at 17:40

## 2022-01-22 RX ADMIN — SODIUM CHLORIDE 125 MILLILITER(S): 9 INJECTION, SOLUTION INTRAVENOUS at 03:32

## 2022-01-22 RX ADMIN — Medication 975 MILLIGRAM(S): at 14:43

## 2022-01-22 RX ADMIN — Medication 325 MILLIGRAM(S): at 10:14

## 2022-01-22 RX ADMIN — Medication 1000 MILLIUNIT(S)/MIN: at 01:40

## 2022-01-22 NOTE — LACTATION INITIAL EVALUATION - NS LACT CON REASON FOR REQ
reviewed bfing basics, positioning techniques, feeding/satiety cues, signs of good latch, and encouraged s2s contact. advised responsive feeding 8-12x/day./general questions without assessment/primaparous mom

## 2022-01-22 NOTE — BH CONSULTATION LIAISON ASSESSMENT NOTE - RISK ASSESSMENT
Chronic risk factors include history of self-injurious vs suicidal behaviors, history of mood disorder, history of cluster B traits, history of impulsivity, history of poor coping skills. Acute risk factors include post-partum period, lack of current antidepressant pharmacotherapy. Mitigating factors include patient denies any SI, any current depressive symptoms, in current outpatient care/adherent, solid support system in mother, stable housing with family, future-oriented, fear of killing self. Overall patient is low chronic and acute risk of suicide at this time.

## 2022-01-22 NOTE — BH CONSULTATION LIAISON ASSESSMENT NOTE - NSCOMMENTSUICRISKFACT_PSY_ALL_CORE
Moderate risk given post-partum period but otherwise low risk given multitude of protective factors and future-orientedness

## 2022-01-22 NOTE — CHART NOTE - NSCHARTNOTEFT_GEN_A_CORE
Informed consent was performed and witnessed at bedside. R/B/A addressed. All questions answered.   Pt was prepped and draped in sterile fashion.   2mL 1% injectable lidocaine was injected into R bicep about 10-12cm from medial epicondyle.  Nexplanon (etonogestrel implant) 68mg injected with no difficulty.  Pressure dressing to be left in place for 12 hours.  Minimal blood loss.   Card with Nexplanon insertion and removal dating and details left with patient.  Consent placed in patient chart.     Dr. Weller PGY2 and Dr. Bacon Attending at bedside Informed consent was performed and witnessed at bedside. R/B/A addressed. All questions answered.   Pt was prepped and draped in sterile fashion.   2mL 1% injectable lidocaine was injected into R bicep about 10-12cm from medial epicondyle.  Nexplanon (etonogestrel implant) 68mg injected with no difficulty.  Pressure dressing to be left in place for 12 hours.  Minimal blood loss.   Card with Nexplanon insertion and removal dating and details left with patient.  Consent placed in patient chart.   LOT [id number], [id number]  EXP Nov 18 2023    Dr. Weller PGY2 and Dr. Bacon Attending at bedside

## 2022-01-22 NOTE — BH CONSULTATION LIAISON ASSESSMENT NOTE - CURRENT MEDICATION
MEDICATIONS  (STANDING):  acetaminophen     Tablet .. 975 milliGRAM(s) Oral every 6 hours  acetaminophen   IVPB .. 1000 milliGRAM(s) IV Intermittent once  diphtheria/tetanus/pertussis (acellular) Vaccine (ADAcel) 0.5 milliLiter(s) IntraMuscular once  enoxaparin Injectable 40 milliGRAM(s) SubCutaneous every 12 hours  ferrous    sulfate 325 milliGRAM(s) Oral daily  folic acid 1 milliGRAM(s) Oral daily  ibuprofen  Tablet. 600 milliGRAM(s) Oral every 6 hours  ketorolac   Injectable 30 milliGRAM(s) IV Push every 6 hours  lactated ringers. 1000 milliLiter(s) (125 mL/Hr) IV Continuous <Continuous>  oxytocin Infusion 333.333 milliUNIT(s)/Min (1000 mL/Hr) IV Continuous <Continuous>  prenatal multivitamin 1 Tablet(s) Oral daily    MEDICATIONS  (PRN):  diphenhydrAMINE 25 milliGRAM(s) Oral every 6 hours PRN Pruritus  lanolin Ointment 1 Application(s) Topical every 6 hours PRN Sore Nipples  magnesium hydroxide Suspension 30 milliLiter(s) Oral two times a day PRN Constipation  naloxone Injectable 0.1 milliGRAM(s) IV Push every 3 minutes PRN For ANY of the following changes in patient status:  A. RR LESS THAN 10 breaths per minute, B. Oxygen saturation LESS THAN 90%, C. Sedation score of 6  oxyCODONE    IR 5 milliGRAM(s) Oral every 3 hours PRN Moderate to Severe Pain (4-10)  oxyCODONE    IR 5 milliGRAM(s) Oral once PRN Moderate to Severe Pain (4-10)  simethicone 80 milliGRAM(s) Chew every 4 hours PRN Gas

## 2022-01-22 NOTE — BH CONSULTATION LIAISON ASSESSMENT NOTE - HPI (INCLUDE ILLNESS QUALITY, SEVERITY, DURATION, TIMING, CONTEXT, MODIFYING FACTORS, ASSOCIATED SIGNS AND SYMPTOMS)
Patient is a 21-year-old woman, domiciled with mother and sister, PPH depression, borderline personality disorder, PTSD, ADHD, multiple prior psychiatric hospitalizations, last to [address] for 4 days in 2021 during pregnancy, 4 prior suicide attempts with unclear intent as they resulted in no physical harm, history of substance abuse (EtOH, cannabis), no known violence, PMH scoliosis, PCOS, admitted to [facility] now s/p  POD1. Psychiatry consulted for evaluation of depression given patient's history.     Upon evaluation, patient was calm, cooperative, pleasant, easy to engage, holding her  on her chest, stroking her back throughout the interview. She denied any current issues with mood or anxiety. Noted her mood as "okay", but did endorse vigilance about post-partum symptoms; "I will definitely be watching out for them and will continue to discuss them with my psychiatrist". She follows with Dr. Smith at [facility], who she started working with upon discharge from [address] inpatient psychiatry in 2021. She initially self-presented for admission at [facility] due to worsening SI and concerns for depression at that time. She was admitted for 4 days and started on Zoloft 25mg daily, denied any SI upon discharge after only those 4 days. Patient then followed Dr. Smith as an outpatient who maintained the patient on Zoloft 25mg before they mutually decided that there was no need for the medication anymore. The patient discontinued Zoloft 25mg 3 weeks prior to delivery and now.     The patient said she is continuing discussions with her psychiatrist 1x monthly about when and if to restart Zoloft. They are both conscious of the potential for post-partum depression given the patient's history of major depressive episodes.    However, the patient at this time denied any SI, HI, denied any AH or paranoid thoughts, denied any intrusive thoughts to harm herself or harm the baby, denied any acute psychiatric concerns. She was future-oriented about caring for her child and is grateful that she has support at home including her mother and sister.    Mother Aye ([phone]) was also spoken to for collateral. She stated that she will be actively monitoring the patient and hopes that she continues discussions with her psychiatrist about restarting Zoloft in the near future if needed. She otherwise has no acute safety concerns about the patient.

## 2022-01-22 NOTE — BH CONSULTATION LIAISON ASSESSMENT NOTE - SUMMARY
Patient is a 21-year-old woman, domiciled with mother and sister, PPH depression, borderline personality disorder, PTSD, ADHD, multiple prior psychiatric hospitalizations, last to [facility] [address] for 4 days in 2021 during pregnancy, 4 prior suicide attempts with unclear intent as they resulted in no physical harm, history of substance abuse (EtOH, cannabis), no known violence, PMH scoliosis, PCOS, admitted to [facility] now s/p  POD1. Psychiatry consulted for evaluation of depression given patient's history.     Patient with history of recent admission during pregnancy to [facility] inpatient psychiatry for 4 days with SI and depressed mood which rapidly improved during brief stay. She was then maintained on Zoloft at only 25mg which was subsequently discontinued with her psychiatrist Dr. Smith at [facility] 3 weeks prior to birth/now due to lack of overt benefits at the time. Patient currently with no acute mood symptoms and on exam does not appear overtly depressed, manic, psychotic. She is future-oriented, denies any SI or violent thoughts. She is aware of the possible risks of the post-partum period for her known mood disorder and will be surveilling her symptoms along with her mother and psychiatrist. Mother also with no acute safety concerns at this time. We will continue to follow patient while she is in the hospital and provide support and psychoeducation.     RECOMMENDATIONS  --No need for acute psychiatric intervention or 1:1  --Continue support and psychoeducation  --Continue discussions about possibly restarting antidepressant  --Will attempt to obtain collateral from Dr. Smith at [facility] [place]  --Will continue to follow at this time

## 2022-01-22 NOTE — LACTATION INITIAL EVALUATION - TERM DELIVERIES, OB PROFILE
[facility] Division of Hospital Medicine    Transfer to Medicine from Surgery     Briefly   72y Male presenting to the emergency department sent from the office by Dr. Edmond for right nondisplaced femoral neck fx s/p fall. Pt states that approx 4 weeks ago he had fallen and had been experiencing worsening right hip pain with ambulation since the fall. He states that he had xrays done in Dr. Matthew office that revealed a non displaced right femoral neck fx. Pt otherwise denies fever/chills, c/p, sob, abdominal pain, n/v, numbness/tingling/weakness and has no other complaints at this time.  No acute overnight events. Patient afebrile, VSS. Pain well controlled. Tolerating diet. Denies n/v/f/c/cp/sob.           MEDICATIONS  (STANDING):  ceFAZolin   IVPB 2000 milliGRAM(s) IV Intermittent once  lactated ringers. 1000 milliLiter(s) (85 mL/Hr) IV Continuous <Continuous>    MEDICATIONS  (PRN):  acetaminophen   Tablet .. 650 milliGRAM(s) Oral every 6 hours PRN Mild Pain (1 - 3)  oxyCODONE    IR 5 milliGRAM(s) Oral every 4 hours PRN Moderate Pain (4 - 6)  oxyCODONE    IR 10 milliGRAM(s) Oral every 4 hours PRN Severe Pain (7 - 10)        I&O's Summary      PHYSICAL EXAM:  Vital Signs Last 24 Hrs  T(C): 37.2 (22 Sep 2021 14:29), Max: 37.2 (22 Sep 2021 14:29)  T(F): 98.9 (22 Sep 2021 14:29), Max: 98.9 (22 Sep 2021 14:29)  HR: 101 (22 Sep 2021 14:29) (74 - 105)  BP: 154/64 (22 Sep 2021 14:29) (123/66 - 157/72)  BP(mean): --  RR: 16 (22 Sep 2021 14:29) (16 - 20)  SpO2: 100% (22 Sep 2021 14:29) (95% - 100%)        CONSTITUTIONAL: NAD, well-developed, well-groomed  ENMT: Moist oral mucosa, no pharyngeal injection or exudates; normal dentition  RESPIRATORY: Normal respiratory effort; lungs are clear to auscultation bilaterally  CARDIOVASCULAR: Regular rate and rhythm, normal S1 and S2, no murmur/rub/gallop; No lower extremity edema; Peripheral pulses are 2+ bilaterally  ABDOMEN: Nontender to palpation, normoactive bowel sounds, no rebound/guarding; No hepatosplenomegaly  MUSCLOSKELETAL:  Normal gait; no clubbing or cyanosis of digits; no joint swelling or tenderness to palpation  PSYCH: A+O to person, place, and time; affect appropriate  NEUROLOGY: CN 2-12 are intact and symmetric; no gross sensory deficits;   SKIN: No rashes; no palpable lesions    LABS:                        11.5   10.23 )-----------( 309      ( 21 Sep 2021 21:28 )             36.6     09-21    140  |  101  |  32.8<H>  ----------------------------<  118<H>  4.2   |  24.0  |  1.21    Ca    9.3      21 Sep 2021 21:28    TPro  7.7  /  Alb  4.2  /  TBili  0.3<L>  /  DBili  x   /  AST  20  /  ALT  12  /  AlkPhos  817<H>  09-21    PT/INR - ( 21 Sep 2021 21:28 )   PT: 14.3 sec;   INR: 1.25 ratio         PTT - ( 21 Sep 2021 21:28 )  PTT:28.1 sec          CAPILLARY BLOOD GLUCOSE      POCT Blood Glucose.: 89 mg/dL (22 Sep 2021 14:34)        RADIOLOGY & ADDITIONAL TESTS:  Results Reviewed:   Imaging Personally Reviewed:  Electrocardiogram Personally Reviewed:                                           [facility] Division of Hospital Medicine    Transfer to Medicine from Surgery     Briefly 72 M presented to the ER after being sent in by his PCP for evaluation of increasing RLE pain. Admitted to [facility] to  72 y Male presenting to the emergency department sent from the office       by Dr. Edmond for right nondisplaced femoral neck fx s/p fall. Pt states that approx 4 weeks ago he had fallen and had been experiencing worsening right hip pain with ambulation since the fall. He states that he had xrays done in Dr. Matthew office that revealed a non displaced right femoral neck fx. Pt otherwise denies fever/chills, c/p, sob, abdominal pain, n/v, numbness/tingling/weakness and has no other complaints at this time.  No acute overnight events. Patient afebrile, VSS. Pain well controlled. Tolerating diet. Denies n/v/f/c/cp/sob.           MEDICATIONS  (STANDING):  ceFAZolin   IVPB 2000 milliGRAM(s) IV Intermittent once  lactated ringers. 1000 milliLiter(s) (85 mL/Hr) IV Continuous <Continuous>    MEDICATIONS  (PRN):  acetaminophen   Tablet .. 650 milliGRAM(s) Oral every 6 hours PRN Mild Pain (1 - 3)  oxyCODONE    IR 5 milliGRAM(s) Oral every 4 hours PRN Moderate Pain (4 - 6)  oxyCODONE    IR 10 milliGRAM(s) Oral every 4 hours PRN Severe Pain (7 - 10)        I&O's Summary      PHYSICAL EXAM:  Vital Signs Last 24 Hrs  T(C): 37.2 (22 Sep 2021 14:29), Max: 37.2 (22 Sep 2021 14:29)  T(F): 98.9 (22 Sep 2021 14:29), Max: 98.9 (22 Sep 2021 14:29)  HR: 101 (22 Sep 2021 14:29) (74 - 105)  BP: 154/64 (22 Sep 2021 14:29) (123/66 - 157/72)  BP(mean): --  RR: 16 (22 Sep 2021 14:29) (16 - 20)  SpO2: 100% (22 Sep 2021 14:29) (95% - 100%)        CONSTITUTIONAL: NAD, well-developed, well-groomed  ENMT: Moist oral mucosa, no pharyngeal injection or exudates; normal dentition  RESPIRATORY: Normal respiratory effort; lungs are clear to auscultation bilaterally  CARDIOVASCULAR: Regular rate and rhythm, normal S1 and S2, no murmur/rub/gallop; No lower extremity edema; Peripheral pulses are 2+ bilaterally  ABDOMEN: Nontender to palpation, normoactive bowel sounds, no rebound/guarding; No hepatosplenomegaly  MUSCLOSKELETAL:  Normal gait; no clubbing or cyanosis of digits; no joint swelling or tenderness to palpation  PSYCH: A+O to person, place, and time; affect appropriate  NEUROLOGY: CN 2-12 are intact and symmetric; no gross sensory deficits;   SKIN: No rashes; no palpable lesions    LABS:                        11.5   10.23 )-----------( 309      ( 21 Sep 2021 21:28 )             36.6     09-21    140  |  101  |  32.8<H>  ----------------------------<  118<H>  4.2   |  24.0  |  1.21    Ca    9.3      21 Sep 2021 21:28    TPro  7.7  /  Alb  4.2  /  TBili  0.3<L>  /  DBili  x   /  AST  20  /  ALT  12  /  AlkPhos  817<H>  09-21    PT/INR - ( 21 Sep 2021 21:28 )   PT: 14.3 sec;   INR: 1.25 ratio         PTT - ( 21 Sep 2021 21:28 )  PTT:28.1 sec          CAPILLARY BLOOD GLUCOSE      POCT Blood Glucose.: 89 mg/dL (22 Sep 2021 14:34)        RADIOLOGY & ADDITIONAL TESTS:  Results Reviewed:   Imaging Personally Reviewed:  Electrocardiogram Personally Reviewed:                                           [facility] Division of Hospital Medicine    Transfer to Medicine from Surgery     Briefly 72 M presented to the ER after being sent in by his PCP for evaluation of increasing RLE pain. Admitted to [facility] to  72 y Male presenting to the emergency department sent from the office after XRays showed an acute fracture. He reports that ~10 weeks ago he as on his roof when he believes he pulled a muscle. After that he has been having increasing pain in his groin and with ambulation ultimately relying on assistance of a cane. States that he has had an episode of stumbling a few days ago and that prompted the Xrays.    Of note he has been diagnosed with Prostate Ca and was started on Bicalutamide after prostate biopsy.     acute worseningh over past few days and that promtped the Xrays. Prior to         . He reports that       by Dr. Edmond for right nondisplaced femoral neck fx s/p fall. Pt states that approx 4 weeks ago he had fallen and had been experiencing worsening right hip pain with ambulation since the fall. He states that he had xrays done in Dr. Matthew office that revealed a non displaced right femoral neck fx. Pt otherwise denies fever/chills, c/p, sob, abdominal pain, n/v, numbness/tingling/weakness and has no other complaints at this time.  No acute overnight events. Patient afebrile, VSS. Pain well controlled. Tolerating diet. Denies n/v/f/c/cp/sob.           MEDICATIONS  (STANDING):  ceFAZolin   IVPB 2000 milliGRAM(s) IV Intermittent once  lactated ringers. 1000 milliLiter(s) (85 mL/Hr) IV Continuous <Continuous>    MEDICATIONS  (PRN):  acetaminophen   Tablet .. 650 milliGRAM(s) Oral every 6 hours PRN Mild Pain (1 - 3)  oxyCODONE    IR 5 milliGRAM(s) Oral every 4 hours PRN Moderate Pain (4 - 6)  oxyCODONE    IR 10 milliGRAM(s) Oral every 4 hours PRN Severe Pain (7 - 10)        I&O's Summary      PHYSICAL EXAM:  Vital Signs Last 24 Hrs  T(C): 37.2 (22 Sep 2021 14:29), Max: 37.2 (22 Sep 2021 14:29)  T(F): 98.9 (22 Sep 2021 14:29), Max: 98.9 (22 Sep 2021 14:29)  HR: 101 (22 Sep 2021 14:29) (74 - 105)  BP: 154/64 (22 Sep 2021 14:29) (123/66 - 157/72)  BP(mean): --  RR: 16 (22 Sep 2021 14:29) (16 - 20)  SpO2: 100% (22 Sep 2021 14:29) (95% - 100%)        CONSTITUTIONAL: NAD, well-developed, well-groomed  ENMT: Moist oral mucosa, no pharyngeal injection or exudates; normal dentition  RESPIRATORY: Normal respiratory effort; lungs are clear to auscultation bilaterally  CARDIOVASCULAR: Regular rate and rhythm, normal S1 and S2, no murmur/rub/gallop; No lower extremity edema; Peripheral pulses are 2+ bilaterally  ABDOMEN: Nontender to palpation, normoactive bowel sounds, no rebound/guarding; No hepatosplenomegaly  MUSCLOSKELETAL:  Normal gait; no clubbing or cyanosis of digits; no joint swelling or tenderness to palpation  PSYCH: A+O to person, place, and time; affect appropriate  NEUROLOGY: CN 2-12 are intact and symmetric; no gross sensory deficits;   SKIN: No rashes; no palpable lesions    LABS:                        11.5   10.23 )-----------( 309      ( 21 Sep 2021 21:28 )             36.6     09-21    140  |  101  |  32.8<H>  ----------------------------<  118<H>  4.2   |  24.0  |  1.21    Ca    9.3      21 Sep 2021 21:28    TPro  7.7  /  Alb  4.2  /  TBili  0.3<L>  /  DBili  x   /  AST  20  /  ALT  12  /  AlkPhos  817<H>  09-21    PT/INR - ( 21 Sep 2021 21:28 )   PT: 14.3 sec;   INR: 1.25 ratio         PTT - ( 21 Sep 2021 21:28 )  PTT:28.1 sec          CAPILLARY BLOOD GLUCOSE      POCT Blood Glucose.: 89 mg/dL (22 Sep 2021 14:34)        RADIOLOGY & ADDITIONAL TESTS:  Results Reviewed:   Imaging Personally Reviewed:  Electrocardiogram Personally Reviewed:                                           [facility] Division of Hospital Medicine    Transfer to Medicine from Surgery     Briefly 72 M presented to the ER after being sent in by his PCP for evaluation of increasing RLE pain. Admitted to [facility] to  72 y Male presenting to the emergency department sent from the office after XRays showed an acute fracture. He reports that ~10 weeks ago he as on his roof when he believes he pulled a muscle. After that he has been having increasing pain in his groin and with ambulation ultimately relying on assistance of a cane. States that he has had an episode of stumbling a few days ago and that prompted the Xrays.    Of note he has been diagnosed with Prostate Ca and was started on Bicalutamide after prostate biopsy. Fracture suspected 2/2 progression of prostate Ca  Denies any current fever, chills, N/V, CP, SOB or changes in gastrointestinal or genitourinary habits       MEDICATIONS  (STANDING):  ceFAZolin   IVPB 2000 milliGRAM(s) IV Intermittent once  lactated ringers. 1000 milliLiter(s) (85 mL/Hr) IV Continuous <Continuous>    MEDICATIONS  (PRN):  acetaminophen   Tablet .. 650 milliGRAM(s) Oral every 6 hours PRN Mild Pain (1 - 3)  oxyCODONE    IR 5 milliGRAM(s) Oral every 4 hours PRN Moderate Pain (4 - 6)  oxyCODONE    IR 10 milliGRAM(s) Oral every 4 hours PRN Severe Pain (7 - 10)        I&O's Summary      PHYSICAL EXAM:  Vital Signs Last 24 Hrs  T(C): 37.2 (22 Sep 2021 14:29), Max: 37.2 (22 Sep 2021 14:29)  T(F): 98.9 (22 Sep 2021 14:29), Max: 98.9 (22 Sep 2021 14:29)  HR: 101 (22 Sep 2021 14:29) (74 - 105)  BP: 154/64 (22 Sep 2021 14:29) (123/66 - 157/72)  BP(mean): --  RR: 16 (22 Sep 2021 14:29) (16 - 20)  SpO2: 100% (22 Sep 2021 14:29) (95% - 100%)        CONSTITUTIONAL: NAD, well-developed, well-groomed  ENMT: Moist oral mucosa, no pharyngeal injection or exudates; normal dentition  RESPIRATORY: Normal respiratory effort; lungs are clear to auscultation bilaterally  CARDIOVASCULAR: Regular rate and rhythm, normal S1 and S2, no murmur/rub/gallop; No lower extremity edema; Peripheral pulses are 2+ bilaterally  ABDOMEN: Nontender to palpation, normoactive bowel sounds, no rebound/guarding; No hepatosplenomegaly  MUSCLOSKELETAL:  Prox RLE tenderness to palpation, no cyanosis, distal NV intact  PSYCH: A+O to person, place, and time; affect appropriate  NEUROLOGY: CN 2-12 are intact and symmetric; no gross sensory deficits;   SKIN: No rashes; no palpable lesions    LABS:                        11.5   10.23 )-----------( 309      ( 21 Sep 2021 21:28 )             36.6     09-21    140  |  101  |  32.8<H>  ----------------------------<  118<H>  4.2   |  24.0  |  1.21    Ca    9.3      21 Sep 2021 21:28    TPro  7.7  /  Alb  4.2  /  TBili  0.3<L>  /  DBili  x   /  AST  20  /  ALT  12  /  AlkPhos  817<H>  09-21    PT/INR - ( 21 Sep 2021 21:28 )   PT: 14.3 sec;   INR: 1.25 ratio         PTT - ( 21 Sep 2021 21:28 )  PTT:28.1 sec          CAPILLARY BLOOD GLUCOSE      POCT Blood Glucose.: 89 mg/dL (22 Sep 2021 14:34)        RADIOLOGY & ADDITIONAL TESTS:    MRI Pelvis  IMPRESSION:  1. Stress fracture of the medial margin of the right subcapital femoral neck involving approximately 25% of the marrow cavity. This may be pathologic as there is an associated osseous metastasis.  2. Extensive diffuse osseous metastases are again seen with large complex pelvic mass encompassing the prostate.                                             0

## 2022-01-22 NOTE — LACTATION INITIAL EVALUATION - LACTATION INTERVENTIONS
initiate/review safe skin-to-skin/initiate/review techniques for position and latch/post discharge community resources provided/review techniques to increase milk supply/reviewed components of an effective feeding and at least 8 effective feedings per day required/reviewed importance of monitoring infant diapers, the breastfeeding log, and minimum output each day/reviewed risks of unnecessary formula supplementation/reviewed risks of artificial nipples/reviewed benefits and recommendations for rooming in/reviewed feeding on demand/by cue at least 8 times a day/reviewed indications of inadequate milk transfer that would require supplementation

## 2022-01-23 LAB
BASOPHILS # BLD AUTO: 0.04 K/UL — SIGNIFICANT CHANGE UP (ref 0–0.2)
BASOPHILS NFR BLD AUTO: 0.4 % — SIGNIFICANT CHANGE UP (ref 0–2)
EOSINOPHIL # BLD AUTO: 0.11 K/UL — SIGNIFICANT CHANGE UP (ref 0–0.5)
EOSINOPHIL NFR BLD AUTO: 1.1 % — SIGNIFICANT CHANGE UP (ref 0–6)
HCT VFR BLD CALC: 26.1 % — LOW (ref 34.5–45)
HGB BLD-MCNC: 8.5 G/DL — LOW (ref 11.5–15.5)
HSV+VZV DNA SPEC QL NAA+PROBE: SIGNIFICANT CHANGE UP
IMM GRANULOCYTES NFR BLD AUTO: 0.4 % — SIGNIFICANT CHANGE UP (ref 0–1.5)
LYMPHOCYTES # BLD AUTO: 3.48 K/UL — HIGH (ref 1–3.3)
LYMPHOCYTES # BLD AUTO: 34.1 % — SIGNIFICANT CHANGE UP (ref 13–44)
MCHC RBC-ENTMCNC: 30 PG — SIGNIFICANT CHANGE UP (ref 27–34)
MCHC RBC-ENTMCNC: 32.6 GM/DL — SIGNIFICANT CHANGE UP (ref 32–36)
MCV RBC AUTO: 92.2 FL — SIGNIFICANT CHANGE UP (ref 80–100)
MONOCYTES # BLD AUTO: 0.71 K/UL — SIGNIFICANT CHANGE UP (ref 0–0.9)
MONOCYTES NFR BLD AUTO: 6.9 % — SIGNIFICANT CHANGE UP (ref 2–14)
NEUTROPHILS # BLD AUTO: 5.84 K/UL — SIGNIFICANT CHANGE UP (ref 1.8–7.4)
NEUTROPHILS NFR BLD AUTO: 57.1 % — SIGNIFICANT CHANGE UP (ref 43–77)
NRBC # BLD: 0 /100 WBCS — SIGNIFICANT CHANGE UP (ref 0–0)
PLATELET # BLD AUTO: 227 K/UL — SIGNIFICANT CHANGE UP (ref 150–400)
RBC # BLD: 2.83 M/UL — LOW (ref 3.8–5.2)
RBC # FLD: 14 % — SIGNIFICANT CHANGE UP (ref 10.3–14.5)
SPECIMEN SOURCE: SIGNIFICANT CHANGE UP
WBC # BLD: 10.22 K/UL — SIGNIFICANT CHANGE UP (ref 3.8–10.5)
WBC # FLD AUTO: 10.22 K/UL — SIGNIFICANT CHANGE UP (ref 3.8–10.5)

## 2022-01-23 PROCEDURE — 99233 SBSQ HOSP IP/OBS HIGH 50: CPT

## 2022-01-23 RX ORDER — IBUPROFEN 200 MG
600 TABLET ORAL EVERY 6 HOURS
Refills: 0 | Status: DISCONTINUED | OUTPATIENT
Start: 2022-01-23 | End: 2022-01-24

## 2022-01-23 RX ADMIN — ENOXAPARIN SODIUM 40 MILLIGRAM(S): 100 INJECTION SUBCUTANEOUS at 00:06

## 2022-01-23 RX ADMIN — Medication 600 MILLIGRAM(S): at 23:57

## 2022-01-23 RX ADMIN — Medication 1 MILLIGRAM(S): at 10:03

## 2022-01-23 RX ADMIN — Medication 600 MILLIGRAM(S): at 05:04

## 2022-01-23 RX ADMIN — Medication 600 MILLIGRAM(S): at 18:00

## 2022-01-23 RX ADMIN — Medication 1 TABLET(S): at 12:31

## 2022-01-23 RX ADMIN — Medication 600 MILLIGRAM(S): at 05:40

## 2022-01-23 RX ADMIN — Medication 975 MILLIGRAM(S): at 10:52

## 2022-01-23 RX ADMIN — Medication 600 MILLIGRAM(S): at 17:26

## 2022-01-23 RX ADMIN — Medication 975 MILLIGRAM(S): at 14:17

## 2022-01-23 RX ADMIN — Medication 975 MILLIGRAM(S): at 21:30

## 2022-01-23 RX ADMIN — Medication 975 MILLIGRAM(S): at 10:03

## 2022-01-23 RX ADMIN — SIMETHICONE 80 MILLIGRAM(S): 80 TABLET, CHEWABLE ORAL at 14:22

## 2022-01-23 RX ADMIN — Medication 325 MILLIGRAM(S): at 10:02

## 2022-01-23 RX ADMIN — Medication 30 MILLIGRAM(S): at 00:30

## 2022-01-23 RX ADMIN — Medication 600 MILLIGRAM(S): at 12:05

## 2022-01-23 RX ADMIN — Medication 975 MILLIGRAM(S): at 20:49

## 2022-01-23 RX ADMIN — ENOXAPARIN SODIUM 40 MILLIGRAM(S): 100 INJECTION SUBCUTANEOUS at 12:12

## 2022-01-23 RX ADMIN — Medication 30 MILLIGRAM(S): at 00:00

## 2022-01-23 RX ADMIN — ENOXAPARIN SODIUM 40 MILLIGRAM(S): 100 INJECTION SUBCUTANEOUS at 23:56

## 2022-01-23 RX ADMIN — Medication 975 MILLIGRAM(S): at 14:59

## 2022-01-23 RX ADMIN — Medication 600 MILLIGRAM(S): at 12:45

## 2022-01-23 NOTE — BH CONSULTATION LIAISON PROGRESS NOTE - NSBHCONSULTFOLLOWAFTERCARE_PSY_A_CORE FT
1)Pt psychiatrically cleared for discharge. Can follow up with her outpatient psychiatrist at [facility].     2)I gave her info for our [facility]- [phone] and will discuss her case with Dr. South. Also gave her info for [facility]- [phone].

## 2022-01-23 NOTE — BH CONSULTATION LIAISON PROGRESS NOTE - NSBHFUPINTERVALHXFT_PSY_A_CORE
Pt seen with mother in the room. I know them both well from her an admission to [facility]. Pt is actually doing very well psychiatrically. Seems to be bonding well with baby. Off zoloft at this time which she says was the advice of her outpatient psychiatrist. I discussed zoloft in breastfeeding with her. Also discussed PMAD ( mood and anxiety disorder clinics) including ours and [facility]. I gave her info for these and will email Dr. South about her as a possible patient.

## 2022-01-23 NOTE — BH CONSULTATION LIAISON PROGRESS NOTE - NSBHMSEREMMEM_PSY_A_CORE
"  History     Chief Complaint:  Tachycardia      The history is provided by the patient and a relative.      Berenice Chaudhari is a 75 year old female who presents with tachycardia.  Patient states she was undergoing evaluation by her physical therapist who noted to be her to be tachycardic.  She also states her blood pressure was \"low\" at 116 systolic.  Patient states she did have generalized fatigue/weakness and fevers to max 101.6 degrees Fahrenheit last week.  However, patient states the last three days she has been feeling better.  Patient denies any chest pain.  She thinks she may be a little short of breath though she attributes this to her recent illness.  Patient denies nausea or vomiting.  She did have one episode of diarrhea today.  She denies any cardiac history or history of tachycardia/arrhythmia.  Patient is wheelchair-bound.    Allergies:  Ceftriaxone  Nafcillin  Penicillins  Vancomycin  Codeine  Clindamycin  Zithromax [Azithromycin]     Medications:    Metoprolol, Effexor, gabapentin, Lyrica, nortriptyline, simvastatin, minocycline, Wellbutrin, atenolol, Protonix, Norvasc     Past Medical History:    Recurrent cellulitis  Postherpetic neuralgia  Depression/anxiety  GERD  Hyperlipidemia  Hypertension  Obesity  CKD    Past Surgical History:    Biopsy  Extensive oral surgery  Remote T&A  Lap cleve  Left hip  Colonoscopy  Cataract IOL, right  Cornea with IOL, left    Family History:    MI    Social History:  Tobacco use: Former smoker, QD 1990  Alcohol use: Rarely  PCP: Nelly Pearl    Marital Status:        Review of Systems   Constitutional: Positive for fever.   Respiratory: Positive for shortness of breath.    Cardiovascular: Negative for chest pain and palpitations.   Gastrointestinal: Positive for diarrhea. Negative for nausea and vomiting.   All other systems reviewed and are negative.      Physical Exam   First Vitals:  BP: 128/86  Pulse: 146  Temp: 97.6  F (36.4  C)  Resp: 20  SpO2: " 100 %    Physical Exam  General: WD/WN; well appearing elderly woman; cooperative  Head:  Atraumatic  Eyes:  EOMI; conjunctivae, lids, and sclerae are normal  ENT:    Normal nose; MMM  Neck:  Supple; normal ROM  CV:  Tachycardic, regular rhythm; normal heart sounds with no m/r/g detected  Resp:  No respiratory distress; CTAB without decreased breath sounds, wheezing, rales, or rhonchi  GI:  Soft; ND/NT, obese    MS:  Normal ROM; no BLE edema  Skin:  Warm; non-diaphoretic; no pallor  Neuro: Awake; A&Ox3; normal strength  Psych:  Normal mood and affect; normal speech  Vitals reviewed.      Emergency Department Course   ECG:  Sinus tachycardia at a rate of 146 bpm.  Right bundle branch block that is new as compared to EKG dated 11/5/14.  No acute ST changes appreciated.    Imaging:  Radiographic findings were communicated with the patient who voiced understanding of the findings.    XR Chest, 2 views:  IMPRESSION: Borderline cardiomegaly, unchanged. Lungs are clear. Normal pulmonary vascularity, improved since the previous exam. Infiltrates have resolved. No pleural effusions.    INOCENCIO LITTLE MD    NM Lung Scan Perfusion Particulate:  IMPRESSION: No evidence of pulmonary embolus.      SEJAL RAMOS MD    Imaging independently reviewed and agree with radiologist interpretation.     Laboratory:  CBC: WNL (WBC 6.3, HGB 14.2, )   BMP: Glucose 153 (H), Creatinine 1.73 (H), GFR 20 (L) ow WNL   D-dimer: 0.8 (H)  2215: Troponin: 0.026  BNP: 3423 (H)  1616: Lactic Acid: 2.7 (H)   2215: Lactic Acid: 0.8  Blood culture x 2: pending    UA: WBC 3 (H), Bacteria few, Mucous present, Hyaline casts 6 (H), Amorphous crystals few, o/w Negative     Interventions:  NS 1L IV Bolus   2106: Adenocard 6 mg IV  2127: Cardizem 25 mg IV   2226: Lovenox 160 mg subcutaneous        Emergency Department Course:  Past medical records, nursing notes, and vitals reviewed.  I performed an exam of the patient and obtained history, as documented  above.   IV inserted and blood drawn.   The patient was sent for a XR while in the emergency department, findings above.     1850: Patient is in nuclear medicine.  I updated family.    2111: Patient was given 6 mg of adenosine.  This was adequate to pause/slow HR to reveal an underlying atrial flutter.  She tolerated the medication very well.    I personally reviewed the laboratory results with the Patient and answered all related questions prior to admission.   Findings and plan explained to the Patient who consents to admission.     Discussed the patient with Dr. Booker, who will admit the patient to a telemetry bed for further monitoring, evaluation, and treatment.        Impression & Plan      Medical Decision Making:  Berenice is a 75-year-old female who presents with asymptomatic tachycardia.  EKG as above.  Concern for PE or sepsis as the source of her tachycardia.  Blood cultures were sent.    Initial lactate is elevated at 2.7.  2 L of IV fluids were ordered and infusing.  Her BNP is elevated at 3423 (prior was 2235); however, her chest x-ray is clear and patient does not appear to be clinically volume overloaded.  CBC is reassuring.  BMP reveals a mild acute kidney injury with a creatinine of 1.73 from baseline of 1.0-1.2.  Troponin is negative.  D-dimer is elevated at 0.8.  Will send for VQ scan (cannot obtain CTA due to kidney injury).    UA is without evidence of romero infection.  V/Q scan without evidence of PE.     Patient reevaluated and her heart rate has not improved despite fluids.  There is no evidence of sepsis or PE as the etiology of her tachycardia.  Will administer adenosine in order to more clearly visualize underlying rhythm.    The patient was given 6 mg of adenosine after the appropriate precautions were put in place including crash cart at bedside and two nurses for infusion.  Patient's heart rate slowed such that I was able to see an underlying atrial flutter.  This slowing was  obviously transient and patient went back into a tachycardic rhythm at a rate of approximately 140 bpm.  Given the diagnosis of her tachycardia as an underlying atrial flutter (2:1) the patient was given Cardizem bolus of 25 mg and an infusion was initiated.  She was given 1 mg/kg of Lovenox.    Repeat lactate has normalized.  Repeat troponin remains negative.    I updated the patient and her family multiple times throughout her stay on the findings, diagnosis, and plan.  I answered all the questions and they verbalized understanding.  They are amenable to admission.    2235: I discussed the patient's case with Dr. Booker, hospitalist, who accepted admission and further orders.    Critical Care time was 40 minutes for this patient excluding procedures    Diagnosis:    ICD-10-CM    1. Atrial flutter with rapid ventricular response (H) I48.92    2. JAMI (acute kidney injury) (H) N17.9        Disposition:  Admitted to hospitalist service.     Sarah Michael  8/7/2017   [facility] EMERGENCY DEPARTMENT       Sarah Michael MD  08/08/17 0047     Normal

## 2022-01-23 NOTE — BH CONSULTATION LIAISON PROGRESS NOTE - CURRENT MEDICATION
MEDICATIONS  (STANDING):  acetaminophen     Tablet .. 975 milliGRAM(s) Oral every 6 hours  acetaminophen   IVPB .. 1000 milliGRAM(s) IV Intermittent once  diphtheria/tetanus/pertussis (acellular) Vaccine (ADAcel) 0.5 milliLiter(s) IntraMuscular once  enoxaparin Injectable 40 milliGRAM(s) SubCutaneous every 12 hours  ferrous    sulfate 325 milliGRAM(s) Oral daily  folic acid 1 milliGRAM(s) Oral daily  ibuprofen  Tablet. 600 milliGRAM(s) Oral every 6 hours  lactated ringers. 1000 milliLiter(s) (125 mL/Hr) IV Continuous <Continuous>  oxytocin Infusion 333.333 milliUNIT(s)/Min (1000 mL/Hr) IV Continuous <Continuous>  prenatal multivitamin 1 Tablet(s) Oral daily    MEDICATIONS  (PRN):  diphenhydrAMINE 25 milliGRAM(s) Oral every 6 hours PRN Pruritus  lanolin Ointment 1 Application(s) Topical every 6 hours PRN Sore Nipples  magnesium hydroxide Suspension 30 milliLiter(s) Oral two times a day PRN Constipation  naloxone Injectable 0.1 milliGRAM(s) IV Push every 3 minutes PRN For ANY of the following changes in patient status:  A. RR LESS THAN 10 breaths per minute, B. Oxygen saturation LESS THAN 90%, C. Sedation score of 6  oxyCODONE    IR 5 milliGRAM(s) Oral every 3 hours PRN Moderate to Severe Pain (4-10)  oxyCODONE    IR 5 milliGRAM(s) Oral once PRN Moderate to Severe Pain (4-10)  simethicone 80 milliGRAM(s) Chew every 4 hours PRN Gas

## 2022-01-23 NOTE — PROGRESS NOTE ADULT - ASSESSMENT
A/P 21yF s/p , POD #1, stable  -  Pain: Transitioning off Toradol to PO motrin & tylenol, oxycodone available for severe pain PRN  -  Nexplanon placed yesterday with no issue  -  Post-operatively labs: post-op Hgb 8.5, hemodynamically stable, no symptoms of anemia   -  Pulm: encourage ISS use  -  GI: tolerating regulars, passing gas  -  : voiding  -  DVT prophylaxis: ambulation, SCDs, lovenox  -  Dispo: POD 3 or 4

## 2022-01-23 NOTE — BH CONSULTATION LIAISON PROGRESS NOTE - NSBHASSESSMENTFT_PSY_ALL_CORE
22 YO F with borderline PD and hx of reccurent MDD now in remission doing much better than I have ever seen her doing and off of zoloft. Has psychiatrist but wants therapy and groups a la PMAD    1)Pt psychiatrically cleared for discharge. Can follow up with her outpatient psychiatrist at [facility].     2)[phone] and will discuss her case with Dr. South. Also gave her info for [facility]- [phone].

## 2022-01-24 ENCOUNTER — TRANSCRIPTION ENCOUNTER (OUTPATIENT)
Age: 22
End: 2022-01-24

## 2022-01-24 VITALS
TEMPERATURE: 98 F | SYSTOLIC BLOOD PRESSURE: 130 MMHG | OXYGEN SATURATION: 98 % | RESPIRATION RATE: 18 BRPM | DIASTOLIC BLOOD PRESSURE: 77 MMHG | HEART RATE: 94 BPM

## 2022-01-24 PROCEDURE — U0003: CPT

## 2022-01-24 PROCEDURE — 36415 COLL VENOUS BLD VENIPUNCTURE: CPT

## 2022-01-24 PROCEDURE — 86769 SARS-COV-2 COVID-19 ANTIBODY: CPT

## 2022-01-24 PROCEDURE — 86850 RBC ANTIBODY SCREEN: CPT

## 2022-01-24 PROCEDURE — 87798 DETECT AGENT NOS DNA AMP: CPT

## 2022-01-24 PROCEDURE — 85384 FIBRINOGEN ACTIVITY: CPT

## 2022-01-24 PROCEDURE — 82570 ASSAY OF URINE CREATININE: CPT

## 2022-01-24 PROCEDURE — 59050 FETAL MONITOR W/REPORT: CPT

## 2022-01-24 PROCEDURE — U0005: CPT

## 2022-01-24 PROCEDURE — 85025 COMPLETE CBC W/AUTO DIFF WBC: CPT

## 2022-01-24 PROCEDURE — C1765: CPT

## 2022-01-24 PROCEDURE — 87801 DETECT AGNT MULT DNA AMPLI: CPT

## 2022-01-24 PROCEDURE — 83615 LACTATE (LD) (LDH) ENZYME: CPT

## 2022-01-24 PROCEDURE — 87563 M. GENITALIUM AMP PROBE: CPT

## 2022-01-24 PROCEDURE — 84156 ASSAY OF PROTEIN URINE: CPT

## 2022-01-24 PROCEDURE — 86900 BLOOD TYPING SEROLOGIC ABO: CPT

## 2022-01-24 PROCEDURE — 80053 COMPREHEN METABOLIC PANEL: CPT

## 2022-01-24 PROCEDURE — 87591 N.GONORRHOEAE DNA AMP PROB: CPT

## 2022-01-24 PROCEDURE — 84550 ASSAY OF BLOOD/URIC ACID: CPT

## 2022-01-24 PROCEDURE — 85730 THROMBOPLASTIN TIME PARTIAL: CPT

## 2022-01-24 PROCEDURE — 86780 TREPONEMA PALLIDUM: CPT

## 2022-01-24 PROCEDURE — 87661 TRICHOMONAS VAGINALIS AMPLIF: CPT

## 2022-01-24 PROCEDURE — 87529 HSV DNA AMP PROBE: CPT

## 2022-01-24 PROCEDURE — 85610 PROTHROMBIN TIME: CPT

## 2022-01-24 PROCEDURE — 86901 BLOOD TYPING SEROLOGIC RH(D): CPT

## 2022-01-24 RX ORDER — ACETAMINOPHEN 500 MG
3 TABLET ORAL
Qty: 0 | Refills: 0 | DISCHARGE
Start: 2022-01-24

## 2022-01-24 RX ORDER — CITALOPRAM 10 MG/1
1 TABLET, FILM COATED ORAL
Qty: 0 | Refills: 0 | DISCHARGE

## 2022-01-24 RX ORDER — IBUPROFEN 200 MG
1 TABLET ORAL
Qty: 0 | Refills: 0 | DISCHARGE
Start: 2022-01-24

## 2022-01-24 RX ADMIN — ENOXAPARIN SODIUM 40 MILLIGRAM(S): 100 INJECTION SUBCUTANEOUS at 12:06

## 2022-01-24 RX ADMIN — Medication 1 MILLIGRAM(S): at 12:07

## 2022-01-24 RX ADMIN — Medication 600 MILLIGRAM(S): at 12:07

## 2022-01-24 RX ADMIN — Medication 975 MILLIGRAM(S): at 09:08

## 2022-01-24 RX ADMIN — Medication 600 MILLIGRAM(S): at 06:16

## 2022-01-24 RX ADMIN — Medication 600 MILLIGRAM(S): at 19:05

## 2022-01-24 RX ADMIN — MAGNESIUM HYDROXIDE 30 MILLILITER(S): 400 TABLET, CHEWABLE ORAL at 09:17

## 2022-01-24 RX ADMIN — Medication 975 MILLIGRAM(S): at 15:09

## 2022-01-24 RX ADMIN — Medication 975 MILLIGRAM(S): at 16:05

## 2022-01-24 RX ADMIN — Medication 600 MILLIGRAM(S): at 13:05

## 2022-01-24 RX ADMIN — Medication 1 TABLET(S): at 12:07

## 2022-01-24 RX ADMIN — Medication 600 MILLIGRAM(S): at 18:04

## 2022-01-24 RX ADMIN — Medication 975 MILLIGRAM(S): at 09:55

## 2022-01-24 RX ADMIN — Medication 600 MILLIGRAM(S): at 00:30

## 2022-01-24 RX ADMIN — Medication 325 MILLIGRAM(S): at 12:07

## 2022-01-24 NOTE — PROGRESS NOTE ADULT - SUBJECTIVE AND OBJECTIVE BOX
Patient is a 21y y.o. F now POD #2 s/p pLTCS for suspected cervical HSV lesion (HSV swab subsequently negative).    No acute events overnight. Patient was evaluated at bedside this AM. Pain is well-controlled with PO pain medications. She has been ambulating without difficulty and voiding spontaneously. She endorses decreasing vaginal bleeding. Passing gas.    Vital Signs Last 24 Hrs  T(C): 36.4 (24 Jan 2022 05:50), Max: 36.8 (23 Jan 2022 22:00)  T(F): 97.5 (24 Jan 2022 05:50), Max: 98.2 (23 Jan 2022 22:00)  HR: 115 (24 Jan 2022 05:50) (93 - 115)  BP: 132/84 (24 Jan 2022 05:50) (108/71 - 132/84)  BP(mean): --  RR: 18 (24 Jan 2022 05:50) (18 - 18)  SpO2: 99% (24 Jan 2022 05:50) (98% - 100%)    Physical Exam  Gen: Well-appearing. No acute distress. Resting comfortably in bed.  Resp: Breathing comfortably on RA.  Abd: Soft, appropriately TTP, non-distended. Uterus firm at umbilicus.  Incision: Steri strips dry/intact  Extremities: No calf tenderness.    Labs                          8.5    10.22 )-----------( 227      ( 23 Jan 2022 06:02 )             26.1                   01-22-22 @ 07:01  -  01-23-22 @ 07:00  --------------------------------------------------------  IN: 0 mL / OUT: 4000 mL / NET: -4000 mL      
Patient evaluated at bedside this morning, resting comfortable in bed, with no acute events overnight. Reports pain is well controlled. She denies headache, dizziness, CP, palpitations, SOB, N/V, or heavy vaginal bleeding.    Ambulating, voiding. Tolerating regular diet. +flatus, -bm    Physical Exam:  Vital Signs Last 24 Hrs  T(C): 36.7 (23 Jan 2022 05:34), Max: 36.9 (22 Jan 2022 10:00)  T(F): 98 (23 Jan 2022 05:34), Max: 98.4 (22 Jan 2022 10:00)  HR: 98 (23 Jan 2022 05:34) (98 - 108)  BP: 119/74 (23 Jan 2022 05:34) (108/72 - 119/74)  RR: 18 (23 Jan 2022 05:34) (18 - 18)  SpO2: 97% (23 Jan 2022 05:34) (96% - 97%)    Gen: NAD, A&0 x 3  Pulm: no incr. WOB  Abd: soft, nontender, mildly distended, no rebound or guarding, incision clean, dry and intact, uterus firm at midline, 2 fb below umbilicus  : voiding  Extremities: no swelling or calf tenderness, SCDs in place                          8.5    10.22 )-----------( 227      ( 23 Jan 2022 06:02 )             26.1     01-21    140  |  106  |  6<L>  ----------------------------<  126<H>  4.1   |  20<L>  |  0.55    Ca    9.4      21 Jan 2022 17:15    TPro  6.3  /  Alb  3.6  /  TBili  0.2  /  DBili  x   /  AST  13  /  ALT  11  /  AlkPhos  179<H>  01-21      PT/INR - ( 21 Jan 2022 17:15 )   PT: 11.5 sec;   INR: 0.96          PTT - ( 21 Jan 2022 17:15 )  PTT:27.1 sec  acetaminophen     Tablet .. 975 milliGRAM(s) Oral every 6 hours  acetaminophen   IVPB .. 1000 milliGRAM(s) IV Intermittent once  diphenhydrAMINE 25 milliGRAM(s) Oral every 6 hours PRN  diphtheria/tetanus/pertussis (acellular) Vaccine (ADAcel) 0.5 milliLiter(s) IntraMuscular once  enoxaparin Injectable 40 milliGRAM(s) SubCutaneous every 12 hours  ferrous    sulfate 325 milliGRAM(s) Oral daily  folic acid 1 milliGRAM(s) Oral daily  ibuprofen  Tablet. 600 milliGRAM(s) Oral every 6 hours  lactated ringers. 1000 milliLiter(s) IV Continuous <Continuous>  lanolin Ointment 1 Application(s) Topical every 6 hours PRN  magnesium hydroxide Suspension 30 milliLiter(s) Oral two times a day PRN  naloxone Injectable 0.1 milliGRAM(s) IV Push every 3 minutes PRN  oxyCODONE    IR 5 milliGRAM(s) Oral every 3 hours PRN  oxyCODONE    IR 5 milliGRAM(s) Oral once PRN  oxytocin Infusion 333.333 milliUNIT(s)/Min IV Continuous <Continuous>  prenatal multivitamin 1 Tablet(s) Oral daily  simethicone 80 milliGRAM(s) Chew every 4 hours PRN

## 2022-01-24 NOTE — PROGRESS NOTE ADULT - ASSESSMENT
Assessment/Plan    21y y.o. F now POD#2 s/p pLTCS. AfVSS. Patient is progressing toward all post-op milestones. Pain is well-controlled on PO medications. Anticipate discharge today.    1. Pain  - Well-controlled on Motrin and Tylenol ATC    2. GI  - Tolerating regular diet without n/v  - Senna PRN    3.   - Voiding spontaneously without difficulty/pain    4. DVT prophylaxis  - Encouraging ambulation  - Lovenox 40 qD while in house    5. Dispo  - Anticipate dispo POD#2        Argentina Russo MD PGY2

## 2022-01-24 NOTE — DISCHARGE NOTE OB - NS MD DC FALL RISK RISK
For information on Fall & Injury Prevention, visit: [email or web address] OR  [email or web address] OR  https://www.cdc.gov/steadi/patient.html

## 2022-01-24 NOTE — DISCHARGE NOTE OB - MEDICATION SUMMARY - MEDICATIONS TO STOP TAKING
I will STOP taking the medications listed below when I get home from the hospital:     mg oral tablet  -- 1 tab(s) by mouth 2 times a day   -- Do not take this drug if you are pregnant.  It is very important that you take or use this exactly as directed.  Do not skip doses or discontinue unless directed by your doctor.  May cause drowsiness or dizziness.  Obtain medical advice before taking any non-prescription drugs as some may affect the action of this medication.  Take with food or milk.    acyclovir-hydrocortisone 5%-1% topical cream  -- Apply on skin to affected area 2 times a day   -- Check with your doctor before becoming pregnant.  For external use only.    Reglan 10 mg oral tablet  -- 1 tab(s) by mouth 4 times a day (before meals and at bedtime) PRN   -- It is very important that you take or use this exactly as directed.  Do not skip doses or discontinue unless directed by your doctor.  May cause drowsiness or dizziness.  Take medication on an empty stomach 1 hour before or 2 to 3 hours after a meal unless otherwise directed by your doctor.    Macrobid 100 mg oral capsule  -- 1 cap(s) by mouth 2 times a day   -- Finish all this medication unless otherwise directed by prescriber.  May discolor urine or feces.  Take with food or milk.    ondansetron 4 mg oral tablet  -- 1 tab(s) by mouth prn    ondansetron 4 mg oral tablet, disintegrating  -- 1 tab(s) by mouth prn

## 2022-01-24 NOTE — DISCHARGE NOTE OB - PATIENT PORTAL LINK FT
You can access the FollowMyHealth Patient Portal offered by [facility] by registering at the following website: [email or web address]. By joining [facility]’s FollowMyHealth portal, you will also be able to view your health information using other applications (apps) compatible with our system.

## 2022-01-24 NOTE — DISCHARGE NOTE OB - IF BREASTFEEDING, ADD A TOTAL OF 500 EXTRA CALORIES EACH DAY
Health Maintenance Summary     Topic Due On Due Status Completed On    MAMMOGRAM - BREAST CANCER SCREENING Jun 7, 2018 Not Due Jun 7, 2017    Diabetes Eye Exam Sep 17, 1989 Overdue     Glycohemoglobin A1C  (Diabetes Sugar)  Nov 7, 2017 Overdue Aug 7, 2017    Microalbumin  (Diabetes Urine Test)  Sep 17, 1989 Overdue     GFR  (Kidney Function Test)  Jul 25, 2018 Not Due Jul 25, 2017    Immunization - TDAP Pregnancy  Hidden     Diabetes Foot Exam  Sep 17, 1989 Overdue     IMMUNIZATION - DTaP/Tdap/Td Dec 5, 2024 Not Due Dec 5, 2014          Patient is due for topics as listed above, she wishes to decline at this time .             Statement Selected

## 2022-01-24 NOTE — DISCHARGE NOTE OB - HOSPITAL COURSE
Pt with extensive psychiatric history (bipolar disorder, MDD, PTSD, anxiety with 8 psychiatric hospitalizations and hx of suicidal ideations/attempts) admitted for induction of labor.  Lesion on cervix noted during admission, suspicious for HSV given patient's history of HSV.  Decision made for primary  section.  Uncomplicated surgery and postoperative course.  Evaluated by psychiatry and cleared for discharge with outpatient referrals.  Acute blood loss anemia noted on post-operative CBC.  Patient stable with normal vital signs.  No intervention necessary.

## 2022-01-24 NOTE — DISCHARGE NOTE OB - CARE PROVIDER_API CALL
Sasha Bacon)  Obstetrics and Gynecology  [address] [place] [address]  Phone: [phone]  Fax: [phone]  Follow Up Time: 1 week

## 2022-01-26 DIAGNOSIS — G89.18 OTHER ACUTE POSTPROCEDURAL PAIN: ICD-10-CM

## 2022-01-27 LAB
A VAGINAE DNA VAG QL NAA+PROBE: SIGNIFICANT CHANGE UP
BVAB2 DNA VAG QL NAA+PROBE: SIGNIFICANT CHANGE UP
C ALBICANS DNA VAG QL NAA+PROBE: NEGATIVE — SIGNIFICANT CHANGE UP
C GLABRATA DNA VAG QL NAA+PROBE: NEGATIVE — SIGNIFICANT CHANGE UP
M GENITALIUM DNA SPEC QL NAA+PROBE: NEGATIVE — SIGNIFICANT CHANGE UP
M HOMINIS DNA SPEC QL NAA+PROBE: NEGATIVE — SIGNIFICANT CHANGE UP
MEGA1 DNA VAG QL NAA+PROBE: SIGNIFICANT CHANGE UP
T VAGINALIS RRNA SPEC QL NAA+PROBE: NEGATIVE — SIGNIFICANT CHANGE UP
UREAPLASMA DNA SPEC QL NAA+PROBE: POSITIVE

## 2022-01-28 DIAGNOSIS — Z3A.40 40 WEEKS GESTATION OF PREGNANCY: ICD-10-CM

## 2022-01-28 DIAGNOSIS — O34.83 MATERNAL CARE FOR OTHER ABNORMALITIES OF PELVIC ORGANS, THIRD TRIMESTER: ICD-10-CM

## 2022-01-28 DIAGNOSIS — O48.0 POST-TERM PREGNANCY: ICD-10-CM

## 2022-01-28 DIAGNOSIS — F90.9 ATTENTION-DEFICIT HYPERACTIVITY DISORDER, UNSPECIFIED TYPE: ICD-10-CM

## 2022-01-28 DIAGNOSIS — A60.9 ANOGENITAL HERPESVIRAL INFECTION, UNSPECIFIED: ICD-10-CM

## 2022-01-28 DIAGNOSIS — F43.10 POST-TRAUMATIC STRESS DISORDER, UNSPECIFIED: ICD-10-CM

## 2022-01-28 DIAGNOSIS — Z91.51 PERSONAL HISTORY OF SUICIDAL BEHAVIOR: ICD-10-CM

## 2022-01-28 DIAGNOSIS — Z14.8 GENETIC CARRIER OF OTHER DISEASE: ICD-10-CM

## 2022-01-28 DIAGNOSIS — J45.909 UNSPECIFIED ASTHMA, UNCOMPLICATED: ICD-10-CM

## 2022-01-28 DIAGNOSIS — F32.5 MAJOR DEPRESSIVE DISORDER, SINGLE EPISODE, IN FULL REMISSION: ICD-10-CM

## 2022-01-28 DIAGNOSIS — F60.3 BORDERLINE PERSONALITY DISORDER: ICD-10-CM

## 2022-01-28 DIAGNOSIS — N83.8 OTHER NONINFLAMMATORY DISORDERS OF OVARY, FALLOPIAN TUBE AND BROAD LIGAMENT: ICD-10-CM

## 2022-01-28 DIAGNOSIS — E28.2 POLYCYSTIC OVARIAN SYNDROME: ICD-10-CM

## 2022-02-07 ENCOUNTER — APPOINTMENT (OUTPATIENT)
Dept: OBGYN | Facility: CLINIC | Age: 22
End: 2022-02-07
Payer: MEDICAID

## 2022-02-07 VITALS
DIASTOLIC BLOOD PRESSURE: 80 MMHG | BODY MASS INDEX: 33.33 KG/M2 | SYSTOLIC BLOOD PRESSURE: 120 MMHG | WEIGHT: 225 LBS | HEIGHT: 69 IN

## 2022-02-07 PROCEDURE — 0503F POSTPARTUM CARE VISIT: CPT

## 2022-02-07 NOTE — HISTORY OF PRESENT ILLNESS
[Postpartum Follow Up] : postpartum follow up [Delivery Date: ___] : on [unfilled] [Female] : Delivery History: baby girl [Clean/Dry/Intact] : clean, dry and intact [Healed] : healed [Mild] : mild vaginal bleeding [Not Done] : Examination of breasts not done [Doing Well] : is doing well [No Sign of Infection] : is showing no signs of infection [None] : The patient is currently asymptomatic [Complications:___] : no complications [Breastfeeding] : not currently nursing [BF with Difficulty] : nursing without difficulty [Erythema] : not erythematous [Swelling] : not swollen [Dehiscence] : not dehisced [Back to Normal] : is still enlarged [FreeTextEntry1] : 22yo P1 for 2 week postpartum visit after c/s for possible herpetic lesion, determined ultimately to be non-herpetic. Pt is not breastfeeding, lives with her mother, who is helping her care for the baby. Gets 6 hours of sleep a night. Pt has hx of borderline personality disorder and depression - recently has started to feel depressed so was restarted on zoloft 50mg QD by her psychiatrist, who she sees monthly. Pt is requesting additional therapy sessions but has not been able to find a provider due to volume of need. Denies SI/HI, states that she knows to ask for help if she feels SI/Hi. States that pain is well controlled and vaginal bleeding now very light. Asking to exercise, advised to wait until after her 6 week postpartum visit. Referral to Rosita South made.

## 2022-02-07 NOTE — ATTENDING NOTE
[Staffed While Pt in Clinic, Pt Seen/Examined by Me] : Staffed while patient in clinic, patient seen and examined by me [] : I agree with the Resident management as it was presented to me, see comments/findings [FreeTextEntry3] : Agree with above. Patient seen and examined by me. AFVSS, incision c/d/i, abdomen soft, ND, NTTP. Pain well controlled. On zoloft 50 mg qd, plan to set up referral to therapist in addition. Denies SI/HI. Patient lives with mom, mood stable but will be proactive given history. F/u in 4 weeks for routine PP exam and pelvic exam.

## 2022-02-16 LAB
ALBUMIN SERPL ELPH-MCNC: 3.9 G/DL
ALP BLD-CCNC: 166 U/L
ALT SERPL-CCNC: 16 U/L
ANION GAP SERPL CALC-SCNC: 14 MMOL/L
AST SERPL-CCNC: 10 U/L
BASOPHILS # BLD AUTO: 0.02 K/UL
BASOPHILS NFR BLD AUTO: 0.2 %
BILIRUB SERPL-MCNC: 0.2 MG/DL
BUN SERPL-MCNC: 10 MG/DL
CALCIUM SERPL-MCNC: 10.1 MG/DL
CHLORIDE SERPL-SCNC: 103 MMOL/L
CO2 SERPL-SCNC: 21 MMOL/L
CREAT SERPL-MCNC: 0.65 MG/DL
CREAT SPEC-SCNC: 172 MG/DL
CREAT/PROT UR: 0.1 RATIO
EOSINOPHIL # BLD AUTO: 0.13 K/UL
EOSINOPHIL NFR BLD AUTO: 1.6 %
GLUCOSE SERPL-MCNC: 82 MG/DL
HCT VFR BLD CALC: 38.7 %
HGB BLD-MCNC: 11.9 G/DL
IMM GRANULOCYTES NFR BLD AUTO: 0.5 %
LDH SERPL-CCNC: 150 U/L
LYMPHOCYTES # BLD AUTO: 1.71 K/UL
LYMPHOCYTES NFR BLD AUTO: 20.8 %
MAN DIFF?: NORMAL
MCHC RBC-ENTMCNC: 29.2 PG
MCHC RBC-ENTMCNC: 30.7 GM/DL
MCV RBC AUTO: 95.1 FL
MONOCYTES # BLD AUTO: 0.56 K/UL
MONOCYTES NFR BLD AUTO: 6.8 %
NEUTROPHILS # BLD AUTO: 5.78 K/UL
NEUTROPHILS NFR BLD AUTO: 70.1 %
PLATELET # BLD AUTO: 293 K/UL
POTASSIUM SERPL-SCNC: 4.7 MMOL/L
PROT SERPL-MCNC: 6.4 G/DL
PROT UR-MCNC: 18 MG/DL
RBC # BLD: 4.07 M/UL
RBC # FLD: 13.7 %
SODIUM SERPL-SCNC: 138 MMOL/L
URATE SERPL-MCNC: 3.7 MG/DL
WBC # FLD AUTO: 8.24 K/UL

## 2022-03-01 LAB — B-HEM STREP SPEC QL CULT: NORMAL

## 2022-03-04 ENCOUNTER — NON-APPOINTMENT (OUTPATIENT)
Age: 22
End: 2022-03-04

## 2022-03-07 ENCOUNTER — APPOINTMENT (OUTPATIENT)
Dept: OBGYN | Facility: CLINIC | Age: 22
End: 2022-03-07
Payer: MEDICAID

## 2022-03-07 VITALS
SYSTOLIC BLOOD PRESSURE: 120 MMHG | BODY MASS INDEX: 33.62 KG/M2 | WEIGHT: 227 LBS | DIASTOLIC BLOOD PRESSURE: 80 MMHG | HEIGHT: 69 IN

## 2022-03-07 PROCEDURE — 0503F POSTPARTUM CARE VISIT: CPT

## 2022-03-07 PROCEDURE — 36415 COLL VENOUS BLD VENIPUNCTURE: CPT

## 2022-03-07 NOTE — END OF VISIT
[] : Resident [FreeTextEntry3] : Pt seen with resident staff\par 6wk PP from PCS for suspected herpetic lesion\par Depression worsening, no SI/HI, formerly on multiple meds prior to pregnancy which helped somewhat\par Has appt tomorrow with psychiatrist\par C/s incision healed\par has nexplanon for contraception

## 2022-03-07 NOTE — HISTORY OF PRESENT ILLNESS
[Postpartum Follow Up] : postpartum follow up [Delivery Date: ___] : on [unfilled] [Female] : Delivery History: baby girl [Last Pap Date: ___] : Last Pap Date: [unfilled] [Currently Experiencing] : The patient is currently experiencing symptoms. [Clean/Dry/Intact] : clean, dry and intact [Healed] : healed [Examination Of The Breasts] : breasts are normal [Doing Well] : is doing well [None] : None [Normal] : uterus [Labia Majora] : labia major [Anteversion] : anteverted [Uterine Adnexae] : were not tender and not enlarged [Tenderness] : nontender [Enlarged ___ wks] : not enlarged [Mass ___ cm] : no uterine mass was palpated [Breastfeeding] : not currently nursing [FreeTextEntry8] : 6 week post partum [de-identified] : hair loss and thinning [FreeTextEntry1] : 22yo w/history of depression now 6weeks postpartum s/p primary c/s for suspected herpetic lesion. Patient reports overall doing well. Focused on taking care of the new baby. She had difficulty breastfeeding and has since decided to bottle feed exclusively. Reports she still has some milk but is no longer breastfeeding.  She reports concern for her thyroid status. She reports mother has history of hashimotos and her sister had some thyroid disorder. She reports thinning hair and hair loss that has worsened in the postpartum period. Also endorsing cold intolerance. Denies recent weight gain or briddle nails. Denies vaginal bleeding, abnormal vaginal discharge or abdominal pain. Reports headaches that resolve at night and with motrin however she reports decreased caffeine intake that may be attributing to headaches, BP at home normotensive. Denies scotoam, RUQ pain, n/v or extreme swelling. She reports she feels well supported at home by her mother and her sister. She reports her depression has worsened in the postpartum period however she denies  suicidal or homicidal ideation, denies any thoughts of self harm or harming the baby or anyone at home with her. Her zoloft has been increased from 50 to 75mg and tomorrow she has her first appointment with Dr. Cuellar. Denies recent weightloss or grain. No changes in eating habits. She is interested in exercising again. Discussed importance of exercise and that she is cleared from a surgical standpoint to begin exercising again. She is not currently sexually active, has nexplanon in place as mode of contraception. Discussed using condoms to protect against STIs, patient confirmed understanding. Of note TSH from prenatal records at [facility] wnl. Plan to repeat today. Last papsmear 7/21 wnl other than trichomonas for which she was treated and had test of cure. No need for repeat papsmear today.

## 2022-03-08 ENCOUNTER — APPOINTMENT (OUTPATIENT)
Dept: PSYCHIATRY | Facility: CLINIC | Age: 22
End: 2022-03-08
Payer: MEDICAID

## 2022-03-08 LAB — TSH SERPL-ACNC: 0.78 UIU/ML

## 2022-03-08 PROCEDURE — 99205 OFFICE O/P NEW HI 60 MIN: CPT | Mod: 95

## 2022-03-09 ENCOUNTER — NON-APPOINTMENT (OUTPATIENT)
Age: 22
End: 2022-03-09

## 2022-03-15 ENCOUNTER — APPOINTMENT (OUTPATIENT)
Dept: PSYCHIATRY | Facility: CLINIC | Age: 22
End: 2022-03-15
Payer: MEDICAID

## 2022-03-15 PROCEDURE — 90834 PSYTX W PT 45 MINUTES: CPT | Mod: 95

## 2022-03-23 ENCOUNTER — APPOINTMENT (OUTPATIENT)
Dept: PSYCHIATRY | Facility: CLINIC | Age: 22
End: 2022-03-23

## 2022-03-23 ENCOUNTER — NON-APPOINTMENT (OUTPATIENT)
Age: 22
End: 2022-03-23

## 2022-03-25 ENCOUNTER — APPOINTMENT (OUTPATIENT)
Dept: PSYCHIATRY | Facility: CLINIC | Age: 22
End: 2022-03-25
Payer: MEDICAID

## 2022-03-25 PROCEDURE — 90834 PSYTX W PT 45 MINUTES: CPT | Mod: 95

## 2022-03-28 ENCOUNTER — APPOINTMENT (OUTPATIENT)
Dept: PSYCHIATRY | Facility: CLINIC | Age: 22
End: 2022-03-28

## 2022-03-29 ENCOUNTER — APPOINTMENT (OUTPATIENT)
Dept: PSYCHIATRY | Facility: CLINIC | Age: 22
End: 2022-03-29

## 2022-03-29 ENCOUNTER — EMERGENCY (EMERGENCY)
Facility: HOSPITAL | Age: 22
LOS: 1 days | Discharge: HOME | End: 2022-03-29
Attending: EMERGENCY MEDICINE | Admitting: EMERGENCY MEDICINE
Payer: COMMERCIAL

## 2022-03-29 ENCOUNTER — NON-APPOINTMENT (OUTPATIENT)
Age: 22
End: 2022-03-29

## 2022-03-29 VITALS
DIASTOLIC BLOOD PRESSURE: 79 MMHG | HEART RATE: 91 BPM | OXYGEN SATURATION: 100 % | RESPIRATION RATE: 18 BRPM | SYSTOLIC BLOOD PRESSURE: 124 MMHG

## 2022-03-29 VITALS
TEMPERATURE: 98 F | WEIGHT: 229.94 LBS | OXYGEN SATURATION: 97 % | RESPIRATION RATE: 18 BRPM | HEART RATE: 115 BPM | HEIGHT: 69 IN | SYSTOLIC BLOOD PRESSURE: 110 MMHG | DIASTOLIC BLOOD PRESSURE: 74 MMHG

## 2022-03-29 DIAGNOSIS — Z20.822 CONTACT WITH AND (SUSPECTED) EXPOSURE TO COVID-19: ICD-10-CM

## 2022-03-29 DIAGNOSIS — L98.9 DISORDER OF THE SKIN AND SUBCUTANEOUS TISSUE, UNSPECIFIED: ICD-10-CM

## 2022-03-29 DIAGNOSIS — F41.9 ANXIETY DISORDER, UNSPECIFIED: ICD-10-CM

## 2022-03-29 DIAGNOSIS — F31.9 BIPOLAR DISORDER, UNSPECIFIED: ICD-10-CM

## 2022-03-29 LAB
ALBUMIN SERPL ELPH-MCNC: 4.6 G/DL — SIGNIFICANT CHANGE UP (ref 3.3–5)
ALP SERPL-CCNC: 72 U/L — SIGNIFICANT CHANGE UP (ref 40–120)
ALT FLD-CCNC: 20 U/L — SIGNIFICANT CHANGE UP (ref 10–45)
ANION GAP SERPL CALC-SCNC: 11 MMOL/L — SIGNIFICANT CHANGE UP (ref 5–17)
APTT BLD: 29.5 SEC — SIGNIFICANT CHANGE UP (ref 27.5–35.5)
AST SERPL-CCNC: 14 U/L — SIGNIFICANT CHANGE UP (ref 10–40)
BASOPHILS # BLD AUTO: 0.06 K/UL — SIGNIFICANT CHANGE UP (ref 0–0.2)
BASOPHILS NFR BLD AUTO: 0.6 % — SIGNIFICANT CHANGE UP (ref 0–2)
BILIRUB SERPL-MCNC: 0.2 MG/DL — SIGNIFICANT CHANGE UP (ref 0.2–1.2)
BUN SERPL-MCNC: 15 MG/DL — SIGNIFICANT CHANGE UP (ref 7–23)
CALCIUM SERPL-MCNC: 9.8 MG/DL — SIGNIFICANT CHANGE UP (ref 8.4–10.5)
CHLORIDE SERPL-SCNC: 101 MMOL/L — SIGNIFICANT CHANGE UP (ref 96–108)
CO2 SERPL-SCNC: 25 MMOL/L — SIGNIFICANT CHANGE UP (ref 22–31)
CREAT SERPL-MCNC: 0.74 MG/DL — SIGNIFICANT CHANGE UP (ref 0.5–1.3)
EGFR: 117 ML/MIN/1.73M2 — SIGNIFICANT CHANGE UP
EOSINOPHIL # BLD AUTO: 0.32 K/UL — SIGNIFICANT CHANGE UP (ref 0–0.5)
EOSINOPHIL NFR BLD AUTO: 3.3 % — SIGNIFICANT CHANGE UP (ref 0–6)
GLUCOSE SERPL-MCNC: 108 MG/DL — HIGH (ref 70–99)
GRAM STN FLD: SIGNIFICANT CHANGE UP
HCT VFR BLD CALC: 35.9 % — SIGNIFICANT CHANGE UP (ref 34.5–45)
HGB BLD-MCNC: 11.4 G/DL — LOW (ref 11.5–15.5)
IMM GRANULOCYTES NFR BLD AUTO: 0.3 % — SIGNIFICANT CHANGE UP (ref 0–1.5)
INR BLD: 1.02 — SIGNIFICANT CHANGE UP (ref 0.88–1.16)
LYMPHOCYTES # BLD AUTO: 2.49 K/UL — SIGNIFICANT CHANGE UP (ref 1–3.3)
LYMPHOCYTES # BLD AUTO: 25.3 % — SIGNIFICANT CHANGE UP (ref 13–44)
MCHC RBC-ENTMCNC: 28.7 PG — SIGNIFICANT CHANGE UP (ref 27–34)
MCHC RBC-ENTMCNC: 31.8 GM/DL — LOW (ref 32–36)
MCV RBC AUTO: 90.4 FL — SIGNIFICANT CHANGE UP (ref 80–100)
MONOCYTES # BLD AUTO: 0.59 K/UL — SIGNIFICANT CHANGE UP (ref 0–0.9)
MONOCYTES NFR BLD AUTO: 6 % — SIGNIFICANT CHANGE UP (ref 2–14)
NEUTROPHILS # BLD AUTO: 6.34 K/UL — SIGNIFICANT CHANGE UP (ref 1.8–7.4)
NEUTROPHILS NFR BLD AUTO: 64.5 % — SIGNIFICANT CHANGE UP (ref 43–77)
NRBC # BLD: 0 /100 WBCS — SIGNIFICANT CHANGE UP (ref 0–0)
PLATELET # BLD AUTO: 365 K/UL — SIGNIFICANT CHANGE UP (ref 150–400)
POTASSIUM SERPL-MCNC: 3.8 MMOL/L — SIGNIFICANT CHANGE UP (ref 3.5–5.3)
POTASSIUM SERPL-SCNC: 3.8 MMOL/L — SIGNIFICANT CHANGE UP (ref 3.5–5.3)
PROT SERPL-MCNC: 7.7 G/DL — SIGNIFICANT CHANGE UP (ref 6–8.3)
PROTHROM AB SERPL-ACNC: 12.2 SEC — SIGNIFICANT CHANGE UP (ref 10.5–13.4)
RBC # BLD: 3.97 M/UL — SIGNIFICANT CHANGE UP (ref 3.8–5.2)
RBC # FLD: 13 % — SIGNIFICANT CHANGE UP (ref 10.3–14.5)
SODIUM SERPL-SCNC: 137 MMOL/L — SIGNIFICANT CHANGE UP (ref 135–145)
SPECIMEN SOURCE: SIGNIFICANT CHANGE UP
WBC # BLD: 9.83 K/UL — SIGNIFICANT CHANGE UP (ref 3.8–10.5)
WBC # FLD AUTO: 9.83 K/UL — SIGNIFICANT CHANGE UP (ref 3.8–10.5)

## 2022-03-29 PROCEDURE — 87186 SC STD MICRODIL/AGAR DIL: CPT

## 2022-03-29 PROCEDURE — 85025 COMPLETE CBC W/AUTO DIFF WBC: CPT

## 2022-03-29 PROCEDURE — 36415 COLL VENOUS BLD VENIPUNCTURE: CPT

## 2022-03-29 PROCEDURE — 85610 PROTHROMBIN TIME: CPT

## 2022-03-29 PROCEDURE — 99284 EMERGENCY DEPT VISIT MOD MDM: CPT

## 2022-03-29 PROCEDURE — 80053 COMPREHEN METABOLIC PANEL: CPT

## 2022-03-29 PROCEDURE — 85730 THROMBOPLASTIN TIME PARTIAL: CPT

## 2022-03-29 PROCEDURE — 99283 EMERGENCY DEPT VISIT LOW MDM: CPT

## 2022-03-29 PROCEDURE — U0003: CPT

## 2022-03-29 PROCEDURE — U0005: CPT

## 2022-03-29 PROCEDURE — 87070 CULTURE OTHR SPECIMN AEROBIC: CPT

## 2022-03-29 NOTE — ED PROVIDER NOTE - CLINICAL SUMMARY MEDICAL DECISION MAKING FREE TEXT BOX
avss. nontoxic. NAD. no systemic sx. no e/o cellulitis vs abscess. no e/o sepsis. wound cx pending. gyn consulted however pt requesting to leave prior to gyn evaluation. no indication for ct imaging at this time. will dc w/ outpatient gyn fu. strict return precautions. pt agrees w/ plan. questions answered.

## 2022-03-29 NOTE — ED PROVIDER NOTE - NSFOLLOWUPINSTRUCTIONS_ED_ALL_ED_FT
PLEASE FOLLOW-UP WITH DR ANUJ IGLESIAS IN 1-2 DAYS.    PLEASE RETURN TO THE EMERGENCY DEPARTMENT IF RASH, FEVER, CHEST PAIN, SHORTNESS OF BREATH, ABDOMINAL PAIN, VOMITING, OTHER CONCERNING SYMPTOMS.    PLEASE CONTACT CHANDAN HOLLAND ([facility] EMERGENCY DEPARTMENT CLINICAL REFERRAL COORDINATOR) TO ASSIST IN SCHEDULING YOUR FOLLOW-UP APPOINTMENT.    Monday - Friday 11am-7pm  [phone]  [email or web address]

## 2022-03-29 NOTE — ED PROVIDER NOTE - OBJECTIVE STATEMENT
22F extensive psych hx (MDD, bipolar, PTSD, anxiety, hx suicidal ideation/attempts, 8 psych hospitalizations), otherwise healthy, recently hospitalization for induction of labor s/p uncomplicated csection (1/22/22), now c/o <48h "hole" above incision w/ yellow drainage and foul odor. no fever/chills, no cp/sob, no abd pain/n/v, no diarrhea, no hematochezia/melena, no change in appetite/po intake, no dysuria, no rash, no trauma, no heavy lifting.    gyn: mamta patton 22F extensive psych hx (MDD, bipolar, PTSD, anxiety, hx suicidal ideation/attempts, 8 psych hospitalizations), otherwise healthy, recent hospitalization for induction of labor s/p uncomplicated csection (1/22/22), now c/o <48h "hole" above incision w/ yellow drainage and foul odor. no fever/chills, no cp/sob, no abd pain/n/v, no diarrhea, no hematochezia/melena, no change in appetite/po intake, no dysuria, no rash, no trauma, no heavy lifting.    gyn: mamta patton

## 2022-03-29 NOTE — ED PROVIDER NOTE - CARE PROVIDER_API CALL
CHIEF COMPLAINT:  Office Visit and Skin Assessment       HISTORY OF PRESENT ILLNESS:  Hanny Momin is a 72 year old female who presented requesting evaluation for a skin exam.     Patient was wondering about the white things on her legs, possibly sun damage.    Callous on the bottom of foot   Dots around lips wondering    PAST HISTORIES:  Personal history of skin cancer: Yes:  Squamous Cell Cancer,  cheek(s)  Family history of skin cancer: Yes: Non Melanoma Skin Cancer  History of sunburns/tanning bed use:  sunburns as a child/teenager and sunburns occasionally as an adult   Sunscreen use: often    Review of systems:    Constitutional: Negative for fever and chills.   Integumentary: Negative for rash.   Cardiovascular: Negative for chest pain or chest pressure.   Hematological: Negative for bleeding easily or anticoagulation therapy    Physical Exam:     General:  Well nourished, well developed, in no acute distress  Skin:   Callus bottom of left foot- patient wants it left alone. Hypopigmented macules on the legs.  Left forearm pink papule. Cherry angiomas. Stuck on papules.  Milia.  Head:  Normocephalic-atraumatic.   Neuro:  Orientated x 4.  No focal deficits.    Total skin exam: Areas checked: Head (including face and scalp), eyelids, lips, neck, upper extremities, lower extremities, buttocks, chest, back, abdomen, and scalp/body hair.    Shave Biopsy Procedure Note:  Location(s):   1. Left forearm    Diagnosis:   1. Rule out SCC    Risks and Benefits:  Possible treatment options were discussed with the patient, including  the benefits and risks of the procedure.  The patient elected to proceed.    Procedure: The patient was appropriately positioned.  The site was cleansed. Local anesthesia was obtained by infiltration using 1%  Buffered Lidocaine with epinephrine.  Using sterile technique a shave biopsy of the lesion was performed flush with the surrounding skin.  Hemostasis was obtained using cautery.  A  sterile dressing was applied.    The patient tolerated the procedure well, without difficulty.  Wound care instructions were given.    Complications: None.    Specimen Disposition: Specimen(s) obtained and sent for pathological evaluation.    Cryotherapy Procedure Note:  Indication: 1 Actinic keratoses leg    Risks and Benefits:  Possible treatment options were discussed with the patient, including  the benefits and risks of the procedure.  The patient elected to proceed.    Procedure: After verbal informed consent with discussion of wound formation, potential incomplete resolution of lesion(s), blistering, pain, and risk for post inflammatory hyper or hypopigmentation, the lesions (as described above in the physical examination and assessment and plan) were treated individually with liquid nitrogen 1-2 times depending on size of lesion and for 5-10 seconds with each freeze as necessitated by the lesion.  Wound care instructions given.  Patient will call with any concerns or complaints.    The patient tolerated the procedure well, without difficulty.    Complications: None.         Assessment/plan: Diagnoses and all orders for this visit:  History of squamous cell carcinoma  Idiopathic guttate hypomelanosis  Cherry angioma  Corns and callus  Lentigines  Seborrheic keratosis  Neoplasm of uncertain behavior of skin  -     SURGICAL PATHOLOGY, DERMATOLOGIC  -     TANGENTIAL BIOPSY OF SKIN SINGLE LESION  Milia  Keratosis, actinic  -     DESTRUCTION PREMALIGNANT 1ST LESION       Benign appearing nevi  · Brown macules and papules, homogeneous  Plan:  Follow  Call if change in growth  Suggest sunscreens, monthly self-examinations, and yearly total skin exam.   Patient to watch for the ABCDE's of pigmented lesions or persistent crusts, erosions, irritated areas.    Technique of skin self exam discussed with patient and given the AAD information on this.    The nature of sun-induced photo-aging and skin cancers is discussed.   Sun avoidance, protective clothing, and the use of 30-SPF sunscreens is advised.    Lentigo  · Brown macules scattered in photo distribution areas  Plan:  Follow  Call if change    Seborrheic Keratoses  · Brownish stuck-on hyperkeratotic, waxy papules  · Thin stuck on papules with increased skin markings  Plan:  Follow  Call if change    Angioma  · Uniform dome-shaped red papules  Plan:  Follow   Call if change    Actinic damage  · Thin actinically damaged skin  · Photo exposed areas  Plan:  Follow  Call if change in growth noted    FOLLOW UP 1 YEAR OR SOONER IF NEEDED.    On 8/30/2021, IIza CMA scribed the services personally  performed by Dr. Emma Jones    I, Dr Jones, attest that the documentation recorded by the scribe accurately and completely reflects the service(s) I personally performed and the decisions made by me.                      Sasha Bacon)  Obstetrics and Gynecology  [address] [place] [address]  Phone: [phone]  Fax: [phone]  Follow Up Time: Urgent

## 2022-03-29 NOTE — ED ADULT NURSE NOTE - OBJECTIVE STATEMENT
pt presents with small circular opening on  incision site.  First noticed it 2 days ago, reports minimal bloody drainage with foul odor. Denies fevers, chills.

## 2022-03-29 NOTE — ED PROVIDER NOTE - PHYSICAL EXAMINATION
CONST: nontoxic NAD speaking in full sentences  HEAD: atraumatic  EYES: conjunctivae clear  ENT: mmm  NECK: supple/FROM  CARD: rrr no murmurs  CHEST: no increased wob  ABD: surgical wound healed, 1mm hole just superior to csection wound w/ clear yellow drainage and foul odor, no crepitus/fluctuance/erythema/warmth, soft, nd, nttp, no rebound/guarding  EXT: FROM, symmetric distal pulses intact  SKIN: warm, dry, no rash, no pedal edema/ttp/rash, cap refill <2sec  NEURO: a+ox3, 5/5 strength x4, gross sensation intact x4, baseline gait CONST: nontoxic NAD speaking in full sentences  HEAD: atraumatic  EYES: conjunctivae clear  ENT: mmm  NECK: supple/FROM  CARD: rrr no murmurs  CHEST: no increased wob  ABD: surgical wound healed, 1mm hole just superior to csection wound w/ scant clear yellow drainage, no foul odor/crepitus/fluctuance/erythema/warmth, soft, nd, nttp, no rebound/guarding  EXT: FROM, symmetric distal pulses intact  SKIN: warm, dry, no rash, no pedal edema/ttp/rash, cap refill <2sec  NEURO: a+ox3, 5/5 strength x4, gross sensation intact x4, baseline gait

## 2022-03-29 NOTE — ED PROVIDER NOTE - PROGRESS NOTE DETAILS
gyn consulted. will see pt. pt w/ full capacity. requesting to leave prior to gyn evaluation. state they will fu as outpatient. understands risk of missed/worsening disease. questions answered. strict return precautions.

## 2022-03-29 NOTE — ED PROVIDER NOTE - PATIENT PORTAL LINK FT
You can access the FollowMyHealth Patient Portal offered by [facility] by registering at the following website: [email or web address]. By joining [facility]’s FollowMyHealth portal, you will also be able to view your health information using other applications (apps) compatible with our system.

## 2022-03-29 NOTE — CHART NOTE - NSCHARTNOTEFT_GEN_A_CORE
GYN initially consulted to evaluate concern for postoperative wound drainage. Patient wanted to leave ED prior to GYN evaluation. Per ED attending, incision does not appear to be infected, small area of serosanguinous drainage superior to incision. Wound cx collected, GYN with f/u. Pt wants to f/u outpatient.

## 2022-03-30 LAB — SARS-COV-2 RNA SPEC QL NAA+PROBE: SIGNIFICANT CHANGE UP

## 2022-04-01 LAB
-  CEFAZOLIN: SIGNIFICANT CHANGE UP
-  CLINDAMYCIN: SIGNIFICANT CHANGE UP
-  ERYTHROMYCIN: SIGNIFICANT CHANGE UP
-  LINEZOLID: SIGNIFICANT CHANGE UP
-  OXACILLIN: SIGNIFICANT CHANGE UP
-  RIFAMPIN: SIGNIFICANT CHANGE UP
-  TRIMETHOPRIM/SULFAMETHOXAZOLE: SIGNIFICANT CHANGE UP
-  VANCOMYCIN: SIGNIFICANT CHANGE UP
CULTURE RESULTS: SIGNIFICANT CHANGE UP
METHOD TYPE: SIGNIFICANT CHANGE UP
ORGANISM # SPEC MICROSCOPIC CNT: SIGNIFICANT CHANGE UP
ORGANISM # SPEC MICROSCOPIC CNT: SIGNIFICANT CHANGE UP
SPECIMEN SOURCE: SIGNIFICANT CHANGE UP

## 2022-04-04 ENCOUNTER — NON-APPOINTMENT (OUTPATIENT)
Age: 22
End: 2022-04-04

## 2022-04-04 ENCOUNTER — APPOINTMENT (OUTPATIENT)
Dept: PSYCHIATRY | Facility: CLINIC | Age: 22
End: 2022-04-04

## 2022-04-04 ENCOUNTER — APPOINTMENT (OUTPATIENT)
Dept: OBGYN | Facility: CLINIC | Age: 22
End: 2022-04-04
Payer: MEDICAID

## 2022-04-04 VITALS
HEIGHT: 69 IN | WEIGHT: 228 LBS | SYSTOLIC BLOOD PRESSURE: 120 MMHG | DIASTOLIC BLOOD PRESSURE: 70 MMHG | BODY MASS INDEX: 33.77 KG/M2

## 2022-04-04 DIAGNOSIS — L02.91 CUTANEOUS ABSCESS, UNSPECIFIED: ICD-10-CM

## 2022-04-04 DIAGNOSIS — L08.9 LOCAL INFECTION OF THE SKIN AND SUBCUTANEOUS TISSUE, UNSPECIFIED: ICD-10-CM

## 2022-04-04 PROCEDURE — 99213 OFFICE O/P EST LOW 20 MIN: CPT

## 2022-04-04 NOTE — ED POST DISCHARGE NOTE - DETAILS
pt called with no answer pt states wound doing well, her GYN started her on abx (she doesn't know the name)

## 2022-04-05 ENCOUNTER — NON-APPOINTMENT (OUTPATIENT)
Age: 22
End: 2022-04-05

## 2022-04-05 ENCOUNTER — APPOINTMENT (OUTPATIENT)
Dept: PSYCHIATRY | Facility: CLINIC | Age: 22
End: 2022-04-05

## 2022-04-05 PROBLEM — L08.9 SUPERFICIAL SKIN INFECTION: Status: ACTIVE | Noted: 2022-04-05

## 2022-04-05 LAB
BASOPHILS # BLD AUTO: 0.05 K/UL
BASOPHILS NFR BLD AUTO: 0.6 %
EOSINOPHIL # BLD AUTO: 0.43 K/UL
EOSINOPHIL NFR BLD AUTO: 5.1 %
HCT VFR BLD CALC: 38.8 %
HGB BLD-MCNC: 11.7 G/DL
IMM GRANULOCYTES NFR BLD AUTO: 0.5 %
LYMPHOCYTES # BLD AUTO: 2.71 K/UL
LYMPHOCYTES NFR BLD AUTO: 32.3 %
MAN DIFF?: NORMAL
MCHC RBC-ENTMCNC: 27.6 PG
MCHC RBC-ENTMCNC: 30.2 GM/DL
MCV RBC AUTO: 91.5 FL
MONOCYTES # BLD AUTO: 0.57 K/UL
MONOCYTES NFR BLD AUTO: 6.8 %
NEUTROPHILS # BLD AUTO: 4.59 K/UL
NEUTROPHILS NFR BLD AUTO: 54.7 %
PLATELET # BLD AUTO: 371 K/UL
RBC # BLD: 4.24 M/UL
RBC # FLD: 13.3 %
WBC # FLD AUTO: 8.39 K/UL

## 2022-04-05 NOTE — END OF VISIT
[] : Resident [FreeTextEntry3] : Pt seen with resident staff\par Presented to ED for drainage above c/s incision however left AMA prior to eval\par States drainage is clear/yellow no bleeding and some foul odor\par Has been decreasing and today no drainage noted\par On exam 1-2mm area proximal to Pfannenstiel not involving inicision, mild erythema and no drainage noted, attempted to probe incision however not able as no true opening\par D/w patient keeping area dry and provided pads, discussed avoiding clothing rubbing on area\par Keflex rx sent\par Infection precautions reviewed, RTC if worsens

## 2022-04-05 NOTE — HISTORY OF PRESENT ILLNESS
[FreeTextEntry1] : 21yo s/p uncomplicated C/S (1/22/22), presented to ER on 3/29/22 after less than 2 days of  "hole" above incision w/ yellow drainage and foul odor. no fever/chills, no cp/sob, no abd pain/n/v, no diarrhea, no hematochezia/melena, no change in appetite/po intake, no dysuria, no rash, no trauma, no heavy lifting. Pt left ER prior to GYN evaluation. Culture of lesion was done which showed staph epidermitis. Since ER visit pt states she has had some leakage o fluid from lesion which has slowed down and is clear to yellow in nature, she endorses some slight discomfort and denies any bleeding from the area. She denies all system symptoms  including, fevers chills, N/V, pruritus and enlarging erythema.\par \par

## 2022-04-05 NOTE — PHYSICAL EXAM
[Soft] : soft [Non-tender] : non-tender [Non-distended] : non-distended [No HSM] : No HSM [No Mass] : no mass [Oriented x3] : oriented x3 [Chaperone Present] : A chaperone was present in the examining room during all aspects of the physical examination [FreeTextEntry7] : 1-2mm punctate lesion 2mm proximal to skin incision from c/s L sided , slight erythema noted, no fluid drainage, unable to probe with q-tip for culture

## 2022-04-05 NOTE — PLAN
[FreeTextEntry1] : Pt presents for lesion\par -unable to probe lesion\par -lesion not draining and unable to be drained\par -non tender to palpation\par -kelfex 500mg [id number] sent to pharmacy\par -pt told to go to ER if drainage worsens or if she develops a fever or if lesion opens again\par

## 2022-04-11 ENCOUNTER — APPOINTMENT (OUTPATIENT)
Dept: PSYCHIATRY | Facility: CLINIC | Age: 22
End: 2022-04-11

## 2022-04-13 ENCOUNTER — APPOINTMENT (OUTPATIENT)
Dept: PSYCHIATRY | Facility: CLINIC | Age: 22
End: 2022-04-13
Payer: MEDICAID

## 2022-04-13 PROCEDURE — 90834 PSYTX W PT 45 MINUTES: CPT | Mod: 95

## 2022-04-18 ENCOUNTER — APPOINTMENT (OUTPATIENT)
Dept: PSYCHIATRY | Facility: CLINIC | Age: 22
End: 2022-04-18

## 2022-04-20 ENCOUNTER — APPOINTMENT (OUTPATIENT)
Dept: PSYCHIATRY | Facility: CLINIC | Age: 22
End: 2022-04-20

## 2022-04-20 ENCOUNTER — NON-APPOINTMENT (OUTPATIENT)
Age: 22
End: 2022-04-20

## 2022-04-25 ENCOUNTER — APPOINTMENT (OUTPATIENT)
Dept: PSYCHIATRY | Facility: CLINIC | Age: 22
End: 2022-04-25

## 2022-04-26 ENCOUNTER — APPOINTMENT (OUTPATIENT)
Dept: PSYCHIATRY | Facility: CLINIC | Age: 22
End: 2022-04-26
Payer: MEDICAID

## 2022-04-26 PROCEDURE — 90834 PSYTX W PT 45 MINUTES: CPT | Mod: 95

## 2022-05-02 ENCOUNTER — APPOINTMENT (OUTPATIENT)
Dept: PSYCHIATRY | Facility: CLINIC | Age: 22
End: 2022-05-02

## 2022-05-03 ENCOUNTER — NON-APPOINTMENT (OUTPATIENT)
Age: 22
End: 2022-05-03

## 2022-05-03 ENCOUNTER — APPOINTMENT (OUTPATIENT)
Dept: PSYCHIATRY | Facility: CLINIC | Age: 22
End: 2022-05-03

## 2022-05-09 ENCOUNTER — APPOINTMENT (OUTPATIENT)
Dept: PSYCHIATRY | Facility: CLINIC | Age: 22
End: 2022-05-09

## 2022-05-12 ENCOUNTER — APPOINTMENT (OUTPATIENT)
Dept: PSYCHIATRY | Facility: CLINIC | Age: 22
End: 2022-05-12
Payer: MEDICAID

## 2022-05-12 PROCEDURE — 90832 PSYTX W PT 30 MINUTES: CPT | Mod: 95

## 2022-05-16 ENCOUNTER — APPOINTMENT (OUTPATIENT)
Dept: PSYCHIATRY | Facility: CLINIC | Age: 22
End: 2022-05-16

## 2022-05-17 ENCOUNTER — APPOINTMENT (OUTPATIENT)
Dept: PSYCHIATRY | Facility: CLINIC | Age: 22
End: 2022-05-17

## 2022-05-18 ENCOUNTER — NON-APPOINTMENT (OUTPATIENT)
Age: 22
End: 2022-05-18

## 2022-05-23 ENCOUNTER — APPOINTMENT (OUTPATIENT)
Dept: PSYCHIATRY | Facility: CLINIC | Age: 22
End: 2022-05-23

## 2022-05-24 ENCOUNTER — APPOINTMENT (OUTPATIENT)
Dept: PSYCHIATRY | Facility: CLINIC | Age: 22
End: 2022-05-24

## 2022-05-24 ENCOUNTER — NON-APPOINTMENT (OUTPATIENT)
Age: 22
End: 2022-05-24

## 2022-05-31 ENCOUNTER — NON-APPOINTMENT (OUTPATIENT)
Age: 22
End: 2022-05-31

## 2022-05-31 ENCOUNTER — APPOINTMENT (OUTPATIENT)
Dept: PSYCHIATRY | Facility: CLINIC | Age: 22
End: 2022-05-31

## 2022-06-06 ENCOUNTER — APPOINTMENT (OUTPATIENT)
Dept: PSYCHIATRY | Facility: CLINIC | Age: 22
End: 2022-06-06

## 2022-06-07 ENCOUNTER — APPOINTMENT (OUTPATIENT)
Dept: PSYCHIATRY | Facility: CLINIC | Age: 22
End: 2022-06-07

## 2022-06-13 ENCOUNTER — APPOINTMENT (OUTPATIENT)
Dept: PSYCHIATRY | Facility: CLINIC | Age: 22
End: 2022-06-13

## 2022-06-14 ENCOUNTER — APPOINTMENT (OUTPATIENT)
Dept: PSYCHIATRY | Facility: CLINIC | Age: 22
End: 2022-06-14
Payer: MEDICAID

## 2022-06-14 PROCEDURE — 90834 PSYTX W PT 45 MINUTES: CPT | Mod: 95

## 2022-06-20 ENCOUNTER — APPOINTMENT (OUTPATIENT)
Dept: PSYCHIATRY | Facility: CLINIC | Age: 22
End: 2022-06-20

## 2022-06-22 ENCOUNTER — APPOINTMENT (OUTPATIENT)
Dept: PSYCHIATRY | Facility: CLINIC | Age: 22
End: 2022-06-22
Payer: MEDICAID

## 2022-06-22 PROCEDURE — 90834 PSYTX W PT 45 MINUTES: CPT | Mod: 95

## 2022-06-27 ENCOUNTER — APPOINTMENT (OUTPATIENT)
Dept: PSYCHIATRY | Facility: CLINIC | Age: 22
End: 2022-06-27

## 2022-06-28 ENCOUNTER — APPOINTMENT (OUTPATIENT)
Dept: PSYCHIATRY | Facility: CLINIC | Age: 22
End: 2022-06-28

## 2022-07-11 ENCOUNTER — APPOINTMENT (OUTPATIENT)
Dept: PSYCHIATRY | Facility: CLINIC | Age: 22
End: 2022-07-11

## 2022-07-12 ENCOUNTER — APPOINTMENT (OUTPATIENT)
Dept: PSYCHIATRY | Facility: CLINIC | Age: 22
End: 2022-07-12

## 2022-07-12 PROCEDURE — 90834 PSYTX W PT 45 MINUTES: CPT | Mod: 95

## 2022-07-13 ENCOUNTER — APPOINTMENT (OUTPATIENT)
Dept: PSYCHIATRY | Facility: CLINIC | Age: 22
End: 2022-07-13

## 2022-07-13 PROCEDURE — 99215 OFFICE O/P EST HI 40 MIN: CPT | Mod: 95

## 2022-07-18 ENCOUNTER — APPOINTMENT (OUTPATIENT)
Dept: PSYCHIATRY | Facility: CLINIC | Age: 22
End: 2022-07-18

## 2022-07-19 ENCOUNTER — APPOINTMENT (OUTPATIENT)
Dept: PSYCHIATRY | Facility: CLINIC | Age: 22
End: 2022-07-19

## 2022-07-19 PROCEDURE — 90832 PSYTX W PT 30 MINUTES: CPT | Mod: 95

## 2022-07-21 NOTE — BH CONSULTATION LIAISON PROGRESS NOTE - NSBHCHARTREVIEWVS_PSY_A_CORE FT
Vital Signs Last 24 Hrs  T(C): 36.8 (23 Jan 2022 22:00), Max: 36.8 (23 Jan 2022 22:00)  T(F): 98.2 (23 Jan 2022 22:00), Max: 98.2 (23 Jan 2022 22:00)  HR: 93 (23 Jan 2022 22:00) (93 - 108)  BP: 124/85 (23 Jan 2022 22:00) (108/71 - 124/85)  BP(mean): --  RR: 18 (23 Jan 2022 22:00) (18 - 18)  SpO2: 100% (23 Jan 2022 22:00) (97% - 100%)
room air

## 2022-07-25 ENCOUNTER — APPOINTMENT (OUTPATIENT)
Dept: PSYCHIATRY | Facility: CLINIC | Age: 22
End: 2022-07-25

## 2022-07-26 ENCOUNTER — APPOINTMENT (OUTPATIENT)
Dept: PSYCHIATRY | Facility: CLINIC | Age: 22
End: 2022-07-26

## 2022-07-26 PROCEDURE — 99214 OFFICE O/P EST MOD 30 MIN: CPT | Mod: 95

## 2022-07-26 PROCEDURE — 90834 PSYTX W PT 45 MINUTES: CPT | Mod: 95

## 2022-08-01 ENCOUNTER — APPOINTMENT (OUTPATIENT)
Dept: PSYCHIATRY | Facility: CLINIC | Age: 22
End: 2022-08-01

## 2022-08-02 ENCOUNTER — APPOINTMENT (OUTPATIENT)
Dept: PSYCHIATRY | Facility: CLINIC | Age: 22
End: 2022-08-02

## 2022-08-04 ENCOUNTER — NON-APPOINTMENT (OUTPATIENT)
Age: 22
End: 2022-08-04

## 2022-08-08 ENCOUNTER — APPOINTMENT (OUTPATIENT)
Dept: PSYCHIATRY | Facility: CLINIC | Age: 22
End: 2022-08-08

## 2022-08-09 ENCOUNTER — APPOINTMENT (OUTPATIENT)
Dept: PSYCHIATRY | Facility: CLINIC | Age: 22
End: 2022-08-09

## 2022-08-09 PROCEDURE — 90834 PSYTX W PT 45 MINUTES: CPT | Mod: 95

## 2022-08-10 ENCOUNTER — APPOINTMENT (OUTPATIENT)
Dept: PSYCHIATRY | Facility: CLINIC | Age: 22
End: 2022-08-10

## 2022-08-10 PROCEDURE — 99214 OFFICE O/P EST MOD 30 MIN: CPT | Mod: 95

## 2022-08-10 RX ORDER — SERTRALINE HYDROCHLORIDE 100 MG/1
100 TABLET, FILM COATED ORAL DAILY
Qty: 30 | Refills: 0 | Status: DISCONTINUED | COMMUNITY
Start: 2022-07-26 | End: 2022-08-10

## 2022-08-15 ENCOUNTER — APPOINTMENT (OUTPATIENT)
Dept: PSYCHIATRY | Facility: CLINIC | Age: 22
End: 2022-08-15

## 2022-08-16 ENCOUNTER — APPOINTMENT (OUTPATIENT)
Dept: PSYCHIATRY | Facility: CLINIC | Age: 22
End: 2022-08-16

## 2022-08-16 ENCOUNTER — NON-APPOINTMENT (OUTPATIENT)
Age: 22
End: 2022-08-16

## 2022-08-16 PROCEDURE — 90834 PSYTX W PT 45 MINUTES: CPT | Mod: 95

## 2022-08-22 ENCOUNTER — APPOINTMENT (OUTPATIENT)
Dept: PSYCHIATRY | Facility: CLINIC | Age: 22
End: 2022-08-22

## 2022-08-23 ENCOUNTER — APPOINTMENT (OUTPATIENT)
Dept: PSYCHIATRY | Facility: CLINIC | Age: 22
End: 2022-08-23

## 2022-08-25 ENCOUNTER — APPOINTMENT (OUTPATIENT)
Dept: PSYCHIATRY | Facility: CLINIC | Age: 22
End: 2022-08-25

## 2022-08-25 PROCEDURE — 99214 OFFICE O/P EST MOD 30 MIN: CPT | Mod: 95

## 2022-08-30 ENCOUNTER — APPOINTMENT (OUTPATIENT)
Dept: PSYCHIATRY | Facility: CLINIC | Age: 22
End: 2022-08-30

## 2022-09-06 ENCOUNTER — APPOINTMENT (OUTPATIENT)
Dept: PSYCHIATRY | Facility: CLINIC | Age: 22
End: 2022-09-06

## 2022-09-06 PROCEDURE — 90834 PSYTX W PT 45 MINUTES: CPT | Mod: 95

## 2022-09-07 ENCOUNTER — APPOINTMENT (OUTPATIENT)
Age: 22
End: 2022-09-07

## 2022-09-13 ENCOUNTER — APPOINTMENT (OUTPATIENT)
Dept: PSYCHIATRY | Facility: CLINIC | Age: 22
End: 2022-09-13

## 2022-09-13 PROCEDURE — 99214 OFFICE O/P EST MOD 30 MIN: CPT | Mod: 95

## 2022-09-13 PROCEDURE — 90834 PSYTX W PT 45 MINUTES: CPT | Mod: 95

## 2022-09-20 ENCOUNTER — NON-APPOINTMENT (OUTPATIENT)
Age: 22
End: 2022-09-20

## 2022-09-20 ENCOUNTER — APPOINTMENT (OUTPATIENT)
Dept: PSYCHIATRY | Facility: CLINIC | Age: 22
End: 2022-09-20

## 2022-09-27 ENCOUNTER — APPOINTMENT (OUTPATIENT)
Dept: PSYCHIATRY | Facility: CLINIC | Age: 22
End: 2022-09-27

## 2022-09-27 PROCEDURE — 90834 PSYTX W PT 45 MINUTES: CPT | Mod: 95

## 2022-10-04 ENCOUNTER — APPOINTMENT (OUTPATIENT)
Dept: PSYCHIATRY | Facility: CLINIC | Age: 22
End: 2022-10-04

## 2022-10-11 ENCOUNTER — NON-APPOINTMENT (OUTPATIENT)
Age: 22
End: 2022-10-11

## 2022-10-11 ENCOUNTER — APPOINTMENT (OUTPATIENT)
Dept: PSYCHIATRY | Facility: CLINIC | Age: 22
End: 2022-10-11

## 2022-10-13 ENCOUNTER — APPOINTMENT (OUTPATIENT)
Dept: PSYCHIATRY | Facility: CLINIC | Age: 22
End: 2022-10-13

## 2022-10-13 PROCEDURE — 90832 PSYTX W PT 30 MINUTES: CPT | Mod: 95

## 2022-10-13 NOTE — PLAN
[Psychodynamic Therapy] : Psychodynamic Therapy  [Supportive Therapy] : Supportive Therapy [de-identified] : Discussed persistent sleep difficulties, and poor self-care, and perspective that 'nothing will make anything better'. Observed providing thoughtful and comprehensive care for her daughter, amid self-doubt, and self-criticism, and hopelessness.  Discussed approaches to sleep w/ daughter, and the impact of mixed messages, and attempts to prioritize daughter's wellbeing amid conflicting advice.  [FreeTextEntry1] : Weekly individual therapy. Psychiatry w/ Dr. Guzman. Pt reports difficulty tolerating medication at doses with any effect.

## 2022-10-13 NOTE — RISK ASSESSMENT
[No, patient denies ideation or behavior] : No, patient denies ideation or behavior [FreeTextEntry7] : Pt did not report any self harm impulses/intentions. Did not report any aggressive behaviors/impulses or intebtions.

## 2022-10-13 NOTE — PLAN
[Psychodynamic Therapy] : Psychodynamic Therapy  [Supportive Therapy] : Supportive Therapy [de-identified] : Discussed persistent sleep difficulties, and poor self-care, and perspective that 'nothing will make anything better'. Observed providing thoughtful and comprehensive care for her daughter, amid self-doubt, and self-criticism, and hopelessness.  Discussed approaches to sleep w/ daughter, and the impact of mixed messages, and attempts to prioritize daughter's wellbeing amid conflicting advice.  [FreeTextEntry1] : Weekly individual therapy. Psychiatry w/ Dr. Guzman. Pt reports difficulty tolerating medication at doses with any effect.

## 2022-10-13 NOTE — END OF VISIT
[Duration of Psychotherapy Visit (minutes spent in synchronous communication): ____] : Duration of Psychotherapy Visit (minutes spent in synchronous communication): [unfilled] [Individual Psychotherapy for 16-37 minutes] : Individual Psychotherapy for 16-37 minutes [Teletherapy Service Provided] : The services provided in this session were delivered via tele-therapy [FreeTextEntry3] : Home [FreeTextEntry5] : Home -- [place] [Licensed Clinician] : Licensed Clinician

## 2022-10-18 ENCOUNTER — APPOINTMENT (OUTPATIENT)
Dept: PSYCHIATRY | Facility: CLINIC | Age: 22
End: 2022-10-18

## 2022-10-18 PROCEDURE — 90834 PSYTX W PT 45 MINUTES: CPT | Mod: 95

## 2022-10-20 NOTE — RISK ASSESSMENT
[No, patient denies ideation or behavior] : No, patient denies ideation or behavior [FreeTextEntry8] : Ms. Correa reports ongoing efforts to cope with stressors, intrusive thoughts/ flashbacks, and criticism so that she is not overwhelmed by her feelings. This includes not over taxing herself, focussing on prioritizing what she understands to be her daugheter's needs and her own, in spite of feedback from others that is often critical.  [FreeTextEntry7] : Pt did not report any self harm impulses/intentions. Did not report any aggressive behaviors/impulses or intentions.  [FreeTextEntry9] : Ms. Correa is encountering ongoing stress and interpersonal conflict, but is reporting intentional efforts to maintain self-control and prioritize personal values

## 2022-10-20 NOTE — PLAN
[Psychodynamic Therapy] : Psychodynamic Therapy  [Supportive Therapy] : Supportive Therapy [FreeTextEntry2] : Emotional control. coping with stress coping with past trauma, strategies for managing family conflict, engaging in positive parenting practices.  [de-identified] : Ms. Correa observed on video, in her room with their daughter Discussed the tenderness and positive regard present in their interactions, visible in spite of her emotional 'barriers' as a result of efforts to manage emotional stress "I know I do everything for her, would do anything for her, I still worry about being able to sustain this."  Ms Correa agreed to try EMDR-focused strategies to manage intrusions of past trauma into present experiences, during a session when she is alone She was thoughtful and collaborative.  [FreeTextEntry1] : Weekly individual therapy. Psychiatry w/ Dr. Guzman. Pt reports difficulty tolerating medication at doses with any effect.

## 2022-10-20 NOTE — PHYSICAL EXAM
[Cooperative] : cooperative [Anxious] : anxious [Full] : full [Linear/Goal Directed] : linear/goal directed [None] : none [Average] : average [WNL] : within normal limits [FreeTextEntry8] : Sad

## 2022-10-20 NOTE — END OF VISIT
[Teletherapy Service Provided] : The services provided in this session were delivered via tele-therapy [Licensed Clinician] : Licensed Clinician [Duration of Psychotherapy Visit (minutes spent in synchronous communication): ____] : Duration of Psychotherapy Visit (minutes spent in synchronous communication): [unfilled] [Individual Psychotherapy for 38-52 minutes] : Individual Psychotherapy for 38 - 52 minutes [FreeTextEntry3] : Home [FreeTextEntry5] : [facility]

## 2022-10-25 ENCOUNTER — APPOINTMENT (OUTPATIENT)
Dept: PSYCHIATRY | Facility: CLINIC | Age: 22
End: 2022-10-25

## 2022-10-25 ENCOUNTER — NON-APPOINTMENT (OUTPATIENT)
Age: 22
End: 2022-10-25

## 2022-10-25 NOTE — DISCUSSION/SUMMARY
[FreeTextEntry1] : Pt seen briefly at the time of our scheduled session, but from her daughter's pediatrician's office. She was collaboratively and thoughtfully engaged, observed interacting with her daughter in a sensitive manner. She expressed disappointment at missing a full session -- will try to find another time to check in.  She expressed frustration around attempts to schedule psychiatry appointments and engaged in some trouble shooting around contacting her psychiatrist.  Confirmed plans to engage in family therapy with her mother.

## 2022-10-31 ENCOUNTER — NON-APPOINTMENT (OUTPATIENT)
Age: 22
End: 2022-10-31

## 2022-11-01 ENCOUNTER — APPOINTMENT (OUTPATIENT)
Dept: PSYCHIATRY | Facility: CLINIC | Age: 22
End: 2022-11-01

## 2022-11-02 ENCOUNTER — APPOINTMENT (OUTPATIENT)
Dept: PSYCHIATRY | Facility: CLINIC | Age: 22
End: 2022-11-02

## 2022-11-02 PROCEDURE — 90832 PSYTX W PT 30 MINUTES: CPT | Mod: 95

## 2022-11-02 PROCEDURE — 99214 OFFICE O/P EST MOD 30 MIN: CPT | Mod: 95

## 2022-11-02 RX ORDER — SERTRALINE HYDROCHLORIDE 100 MG/1
100 TABLET, FILM COATED ORAL DAILY
Qty: 60 | Refills: 0 | Status: DISCONTINUED | COMMUNITY
Start: 2022-07-26 | End: 2022-11-02

## 2022-11-02 NOTE — PHYSICAL EXAM
[Cooperative] : cooperative [Anxious] : anxious [Angry] : angry [Full] : full [Linear/Goal Directed] : linear/goal directed [None] : none [Average] : average [WNL] : within normal limits [FreeTextEntry8] : Sad
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
## 2022-11-02 NOTE — REASON FOR VISIT
[Home] : at home, [unfilled] , at the time of the visit. [Other Location: e.g. Home (Enter Location, City,State)___] : at [unfilled] [Patient] : Patient [FreeTextEntry1] : medication management

## 2022-11-02 NOTE — PLAN
[Psychodynamic Therapy] : Psychodynamic Therapy  [Supportive Therapy] : Supportive Therapy [FreeTextEntry2] : Emotional control. coping with stress coping with past trauma, strategies for managing family conflict, engaging in positive parenting practices.  [de-identified] : Ms. Correa Discussed barriers to self-control and emotion regulation, i.e. living up to her own goals and priorities, which often include receiving criticism and negative feedback from others who don't perceive her efforts and progress. Discsussed ongoing efforts related to self-care. Observed preparing a healthy and nutritious meal for her daughter, and she described efforts to support her daughter's health and wellbeing. Discsused priorities for psychiatry visit and how to ask for medication changes as "sertraline isn't working and at high doses makes me feel worse."   [FreeTextEntry1] : Weekly individual therapy. Psychiatry w/ Dr. Guzman.  Reports difficulty tolerating medication at doses that tend to have any psycholgical effect. She would 'like to have medications that work to support control and help me do better."
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
## 2022-11-02 NOTE — END OF VISIT
[Duration of Psychotherapy Visit (minutes spent in synchronous communication): ____] : Duration of Psychotherapy Visit (minutes spent in synchronous communication): [unfilled] [Individual Psychotherapy for 16-37 minutes] : Individual Psychotherapy for 16-37 minutes [Teletherapy Service Provided] : The services provided in this session were delivered via tele-therapy [Licensed Clinician] : Licensed Clinician [FreeTextEntry3] : Home [FreeTextEntry5] : GARCÍA Flynn
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
## 2022-11-02 NOTE — RISK ASSESSMENT
[No, patient denies ideation or behavior] : No, patient denies ideation or behavior [FreeTextEntry8] : Ms. Correa reports ongoing efforts to cope with stressors, intrusive thoughts/ flashbacks, and criticism so that she is not overwhelmed by her feelings. This includes not over taxing herself, focussing on prioritizing what she understands to be her daughter's needs and her own, in spite of feedback from others that is often critical. She reports ongoing efforts to maintain self-control and emotional control amid perceived critique and negative feedback on these efforts/lack of recognition of efforts.   [FreeTextEntry7] : Pt did not report any self harm impulses/intentions. Did not report any aggressive behaviors/impulses or intentions.  [FreeTextEntry9] : Ms. Correa is encountering ongoing stress and interpersonal conflict, but is reporting intentional efforts to maintain self-control and prioritize personal values

## 2022-11-02 NOTE — PHYSICAL EXAM
[Normal] : good [FreeTextEntry5] : tele encounter [None] : none [Cooperative] : cooperative [Euthymic] : euthymic [Full] : full [Clear] : clear [Linear/Goal Directed] : linear/goal directed [Average] : average [WNL] : within normal limits [FreeTextEntry8] : d

## 2022-11-02 NOTE — FAMILY HISTORY
[FreeTextEntry1] : \par PSYCHIATRIC ILLNESS:\par [SZ and depression on mother's side; father's side anger management issues and PDs\par SUICIDE:\par [ Hx of family suicide attempts (multiple family members) \par SUBSTANCE ABUSE:\par  [ Brother  by drug OD (opioids)
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
## 2022-11-08 ENCOUNTER — APPOINTMENT (OUTPATIENT)
Dept: PSYCHIATRY | Facility: CLINIC | Age: 22
End: 2022-11-08

## 2022-11-08 PROCEDURE — 90832 PSYTX W PT 30 MINUTES: CPT | Mod: 95

## 2022-11-08 NOTE — PHYSICAL EXAM
[Cooperative] : cooperative [Anxious] : anxious [Angry] : angry [Full] : full [Clear] : clear [Linear/Goal Directed] : linear/goal directed [None] : none [Average] : average [WNL] : within normal limits

## 2022-11-08 NOTE — END OF VISIT
[Duration of Psychotherapy Visit (minutes spent in synchronous communication): ____] : Duration of Psychotherapy Visit (minutes spent in synchronous communication): [unfilled] [Individual Psychotherapy for 16-37 minutes] : Individual Psychotherapy for 16-37 minutes [Teletherapy Service Provided] : The services provided in this session were delivered via tele-therapy [FreeTextEntry3] : Home [FreeTextEntry5] : [facility] [Licensed Clinician] : Licensed Clinician
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
## 2022-11-10 ENCOUNTER — APPOINTMENT (OUTPATIENT)
Dept: PSYCHIATRY | Facility: CLINIC | Age: 22
End: 2022-11-10

## 2022-11-15 ENCOUNTER — APPOINTMENT (OUTPATIENT)
Dept: PSYCHIATRY | Facility: CLINIC | Age: 22
End: 2022-11-15

## 2022-11-17 ENCOUNTER — APPOINTMENT (OUTPATIENT)
Dept: PSYCHIATRY | Facility: CLINIC | Age: 22
End: 2022-11-17

## 2022-11-17 NOTE — REASON FOR VISIT
[Patient with collateral] : Patient with collateral  [Mother] : mother [Family Member] : family member [TextBox_17] : Aye Carlos, Ainsley Correa [FreeTextEntry1] : Family Therapy

## 2022-11-17 NOTE — ADDENDUM
[FreeTextEntry1] : . . Case discussed in clinical supervision with Dr. Carlos Oakes [[X ]] individual [[ ]] group (Check one) ASSESSMENT METHOD (Check all that apply):  [X[ ]] Case presentation  [[ ]] Review of Record/Data  [[ ]] Direct Observation  [[ ]] Audio Recording  [[ ]] Video Recording  FOCUS OF SUPERVISION (Check all that apply):  [[ ]] Diagnosis & Assessment  [[X ]] Intervention  [[ ]] Professional Conduct  [[ ]] Culture and Diversity  [[ ]] Scholarly Inquiry    [[ ]] Consultation  [[ ]] Supervision  [[ ]] Administration/Documentation

## 2022-11-17 NOTE — PLAN
[Family Therapy (all types)] : Family Therapy (all types)  [FreeTextEntry2] : Communication patterns between family members \par Coexistence in the same house with family members \par  [de-identified] : Pt, pt's sister and pt's mother report ongoing difficulties in communication between the three of them, exacerbated by the recent arrival of pt's infant and disagreements about strategies for caring for the baby (currently ~9 months old). Pt, pt's siter, and pt's mother report motivation for family therapy in part due to all living under one roof, which has exacerbated their interpersonal difficulties. Pt and pt's sister report differing experiences growing up with their same mother, contributing to difficulties in their relationship. Interventions centered around conceptualization of communication difficulties as related to interpersonal dynamics and exacerbated by more acute stressors. Family responded positively to interventions and to meeting weekly to work on these issues.

## 2022-11-17 NOTE — END OF VISIT
[Family Therapy With Client Present] : Family Therapy With Client Present  [FreeTextEntry3] : Home [FreeTextEntry5] : [facility]

## 2022-11-22 ENCOUNTER — APPOINTMENT (OUTPATIENT)
Dept: PSYCHIATRY | Facility: CLINIC | Age: 22
End: 2022-11-22

## 2022-11-22 PROCEDURE — 99214 OFFICE O/P EST MOD 30 MIN: CPT | Mod: 95
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
## 2022-11-22 NOTE — RISK ASSESSMENT
[No, patient denies ideation or behavior] : No, patient denies ideation or behavior [FreeTextEntry9] : low acute risk of harm to self/others.
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
## 2022-11-22 NOTE — REASON FOR VISIT
[FreeTextEntry1] : medication management [Home] : at home, [unfilled] , at the time of the visit. [Other Location: e.g. Home (Enter Location, City,State)___] : at [unfilled] [Patient] : the patient

## 2022-11-22 NOTE — PLAN
Pt presented  requesting to be seen for a asthmatic cough, would like to see if a nurse of [facility] can send her a prescription to [facility] on [address] in [facility] , states if she waits any longer it will get worse, .if someone can please give her a call thank you    [FreeTextEntry4] : addressing low mood and anxiety and impulsivity
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
## 2022-11-29 ENCOUNTER — NON-APPOINTMENT (OUTPATIENT)
Age: 22
End: 2022-11-29

## 2022-11-29 ENCOUNTER — APPOINTMENT (OUTPATIENT)
Dept: PSYCHIATRY | Facility: CLINIC | Age: 22
End: 2022-11-29

## 2022-12-01 ENCOUNTER — APPOINTMENT (OUTPATIENT)
Dept: PSYCHIATRY | Facility: CLINIC | Age: 22
End: 2022-12-01

## 2022-12-03 NOTE — ADDENDUM
[FreeTextEntry1] : . . Case discussed in clinical supervision with Lu Ortiz PsyD  [[X ]] individual [[ ]] group (Check one) ASSESSMENT METHOD (Check all that apply):  [X[ ]] Case presentation  [[ ]] Review of Record/Data  [[ ]] Direct Observation  [[ ]] Audio Recording  [[ ]] Video Recording  FOCUS OF SUPERVISION (Check all that apply):  [[ ]] Diagnosis & Assessment  [[X ]] Intervention  [[ ]] Professional Conduct  [[ ]] Culture and Diversity  [[ ]] Scholarly Inquiry    [[ ]] Consultation  [[ ]] Supervision  [[ ]] Administration/Documentation

## 2022-12-03 NOTE — PLAN
[Family Therapy (all types)] : Family Therapy (all types)  [Return in ____ week(s)] : Return in [unfilled] week(s) [FreeTextEntry2] : Communication patterns between family members \par Coexistence in the same house with family members \par  [de-identified] : Pt, pt's sister and pt's mother continued to discuss ongoing difficulties in communication between the three of them and in their relationship to another close family. Patterns of support and protection as well as frustration and anger between the family was discussed.  Interventions centered around conceptualization of communication difficulties as related to interpersonal dynamics and exacerbated by both acute and ongoing stressors. Family responded in a positiveway to interventions, and to continuing family therapy.

## 2022-12-03 NOTE — END OF VISIT
[Family Therapy With Client Present] : Family Therapy With Client Present  [FreeTextEntry3] : Home [FreeTextEntry5] : [facility]

## 2022-12-06 ENCOUNTER — APPOINTMENT (OUTPATIENT)
Dept: PSYCHIATRY | Facility: CLINIC | Age: 22
End: 2022-12-06

## 2022-12-06 PROCEDURE — 90834 PSYTX W PT 45 MINUTES: CPT | Mod: 95

## 2022-12-06 PROCEDURE — ZZZZZ: CPT

## 2022-12-06 NOTE — FAMILY HISTORY
[FreeTextEntry1] : \par PSYCHIATRIC ILLNESS:\par [SZ and depression on mother's side; father's side anger management issues and PDs\par SUICIDE:\par [ Hx of family suicide attempts (multiple family members) \par SUBSTANCE ABUSE:\par  [ Brother  by drug OD (opioids)
Breath sounds clear and equal bilaterally.
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
## 2022-12-07 NOTE — RISK ASSESSMENT
[No, patient denies ideation or behavior] : No, patient denies ideation or behavior [FreeTextEntry8] : Ms. Correa reports ongoing efforts to cope with stressors, intrusive thoughts/ flashbacks, and criticism so that she is not overwhelmed by her feelings. This includes not over taxing herself, prioritizing sleep, focussing on prioritizing what she understands to be her daughter's needs over everyone else's, in spite of feedback from others that is often critical. She reports ongoing efforts to maintain self-control and emotional control amid relentless critique and negative feedback on these efforts/lack of recognition of efforts.   [FreeTextEntry7] : Pt did not report any self harm impulses/intentions. [FreeTextEntry9] : Ms. Correa is encountering ongoing stress and interpersonal conflict, but is reporting intentional efforts to maintain self-control and prioritize personal values.

## 2022-12-07 NOTE — PLAN
[Eye Movement Desensitization and Reprocessing Therapy (EMDR)] : Eye Movement Desensitization and Reprocessing Therapy (EMDR) [Psychodynamic Therapy] : Psychodynamic Therapy  [Supportive Therapy] : Supportive Therapy [FreeTextEntry2] : Emotional control. coping with stress coping with past trauma, strategies for managing family conflict, engaging in positive parenting practices.  [de-identified] : Ms. Correa discussed distress related to family conflict, and experiencing relentless attempts to undermine her efforts to be a mom -- being asked to sign over parental rights, for example.  discussed complexities of needing income, e.g. to pay a phonebill (her friend paid for her this month) but not being able to sleep enough to function at work. Discussed coping and self-regualtion. Ms. Correa was observed interacting with her daughter, and many shared moments of delight. Her daughter is engaging and developing well.  [FreeTextEntry1] : Weekly individual therapy. Psychiatry w/ Dr. Guzman.  Reports tolerating new prozac regimen so far.  Engaged in family therapy.

## 2022-12-07 NOTE — PHYSICAL EXAM
[Cooperative] : cooperative [Anxious] : anxious [Full] : full [Clear] : clear [Linear/Goal Directed] : linear/goal directed [None] : none [WNL] : within normal limits [Well groomed] : well groomed [Average] : average [Depressed] : depressed [FreeTextEntry1] : Britney had a new hairstyle [FreeTextEntry5] : collaborative

## 2022-12-07 NOTE — END OF VISIT
[Teletherapy Service Provided] : The services provided in this session were delivered via tele-therapy [Licensed Clinician] : Licensed Clinician [Duration of Psychotherapy Visit (minutes spent in synchronous communication): ____] : Duration of Psychotherapy Visit (minutes spent in synchronous communication): [unfilled] [Individual Psychotherapy for 38-52 minutes] : Individual Psychotherapy for 38 - 52 minutes [FreeTextEntry3] : Home [FreeTextEntry5] : [facility]

## 2022-12-07 NOTE — REASON FOR VISIT
[Patient] : Patient [FreeTextEntry1] : Fatigue, low mood, coping with intense family dynamics, coping with past trauma and related sxs, improving coping/self-regulation

## 2022-12-08 ENCOUNTER — APPOINTMENT (OUTPATIENT)
Dept: PSYCHIATRY | Facility: CLINIC | Age: 22
End: 2022-12-08

## 2022-12-13 ENCOUNTER — APPOINTMENT (OUTPATIENT)
Dept: PSYCHIATRY | Facility: CLINIC | Age: 22
End: 2022-12-13

## 2022-12-13 PROCEDURE — 90834 PSYTX W PT 45 MINUTES: CPT | Mod: 95

## 2022-12-14 NOTE — PLAN
[Eye Movement Desensitization and Reprocessing Therapy (EMDR)] : Eye Movement Desensitization and Reprocessing Therapy (EMDR) [Psychodynamic Therapy] : Psychodynamic Therapy  [Supportive Therapy] : Supportive Therapy [FreeTextEntry2] : Emotional control. coping with stress coping with past trauma, strategies for managing family conflict, engaging in positive parenting practices.  [de-identified] : Ms. Correa was observed with her daughter. They were both present and engaged, very interactive over zoom. She spoke about ongoing efforts to manage fatigue, distress, worry about managing the demands of motherhood, while prioritizing her daughter. Noted how much she focusses on her daughter's wellbeing, and how little she focusses on her own -- e.g. her daughter has a full 'mediterranean meal (organic baby food) and she was only going to eat tostones, and reported low appetite.  Reports decreased reliance on MJ for mood regulation.  [FreeTextEntry1] : Weekly individual therapy. Psychiatry w/ Dr. Guzman.  Reports tolerating new prozac regimen so far.  Engaged in family therapy w/ sister and mother.

## 2022-12-14 NOTE — END OF VISIT
[Duration of Psychotherapy Visit (minutes spent in synchronous communication): ____] : Duration of Psychotherapy Visit (minutes spent in synchronous communication): [unfilled] [Individual Psychotherapy for 38-52 minutes] : Individual Psychotherapy for 38 - 52 minutes [Teletherapy Service Provided] : The services provided in this session were delivered via tele-therapy [Licensed Clinician] : Licensed Clinician [FreeTextEntry3] : Home [FreeTextEntry5] : [facility]

## 2022-12-14 NOTE — RISK ASSESSMENT
[No, patient denies ideation or behavior] : No, patient denies ideation or behavior [FreeTextEntry8] : Ms. Correa reports ongoing efforts to cope with stressors, intrusive thoughts/ flashbacks, and criticism so that she is not overwhelmed by her feelings. This includes not over taxing herself, prioritizing sleep, focussing on prioritizing what she understands to be her daughter's needs over everyone else's, in spite of feedback from others that is often critical. She reports ongoing efforts to maintain self-control and emotional control amid relentless critique and negative feedback on these efforts/lack of recognition of efforts.  She did not report SI in recent sessions.  [FreeTextEntry7] : Pt did not report any self harm impulses/intentions. [FreeTextEntry9] : Ms. Correa is encountering ongoing stress and interpersonal conflict, but is reporting intentional efforts to maintain self-control and prioritize personal values.

## 2022-12-15 ENCOUNTER — APPOINTMENT (OUTPATIENT)
Dept: PSYCHIATRY | Facility: CLINIC | Age: 22
End: 2022-12-15

## 2022-12-17 VITALS
OXYGEN SATURATION: 97 % | HEART RATE: 124 BPM | TEMPERATURE: 98 F | HEIGHT: 69 IN | WEIGHT: 250 LBS | RESPIRATION RATE: 16 BRPM | DIASTOLIC BLOOD PRESSURE: 70 MMHG | SYSTOLIC BLOOD PRESSURE: 109 MMHG

## 2022-12-17 LAB
ALBUMIN SERPL ELPH-MCNC: 4.3 G/DL — SIGNIFICANT CHANGE UP (ref 3.3–5)
ALP SERPL-CCNC: 65 U/L — SIGNIFICANT CHANGE UP (ref 40–120)
ALT FLD-CCNC: 24 U/L — SIGNIFICANT CHANGE UP (ref 10–45)
ANION GAP SERPL CALC-SCNC: 13 MMOL/L — SIGNIFICANT CHANGE UP (ref 5–17)
AST SERPL-CCNC: 18 U/L — SIGNIFICANT CHANGE UP (ref 10–40)
BASOPHILS # BLD AUTO: 0.07 K/UL — SIGNIFICANT CHANGE UP (ref 0–0.2)
BASOPHILS NFR BLD AUTO: 0.7 % — SIGNIFICANT CHANGE UP (ref 0–2)
BILIRUB SERPL-MCNC: <0.2 MG/DL — SIGNIFICANT CHANGE UP (ref 0.2–1.2)
BUN SERPL-MCNC: 11 MG/DL — SIGNIFICANT CHANGE UP (ref 7–23)
CALCIUM SERPL-MCNC: 9.8 MG/DL — SIGNIFICANT CHANGE UP (ref 8.4–10.5)
CHLORIDE SERPL-SCNC: 100 MMOL/L — SIGNIFICANT CHANGE UP (ref 96–108)
CO2 SERPL-SCNC: 23 MMOL/L — SIGNIFICANT CHANGE UP (ref 22–31)
CREAT SERPL-MCNC: 0.89 MG/DL — SIGNIFICANT CHANGE UP (ref 0.5–1.3)
EGFR: 94 ML/MIN/1.73M2 — SIGNIFICANT CHANGE UP
EOSINOPHIL # BLD AUTO: 0.4 K/UL — SIGNIFICANT CHANGE UP (ref 0–0.5)
EOSINOPHIL NFR BLD AUTO: 3.7 % — SIGNIFICANT CHANGE UP (ref 0–6)
ETHANOL SERPL-MCNC: <10 MG/DL — SIGNIFICANT CHANGE UP (ref 0–10)
GLUCOSE SERPL-MCNC: 138 MG/DL — HIGH (ref 70–99)
HCT VFR BLD CALC: 37 % — SIGNIFICANT CHANGE UP (ref 34.5–45)
HGB BLD-MCNC: 12.1 G/DL — SIGNIFICANT CHANGE UP (ref 11.5–15.5)
IMM GRANULOCYTES NFR BLD AUTO: 0.4 % — SIGNIFICANT CHANGE UP (ref 0–0.9)
LYMPHOCYTES # BLD AUTO: 3.76 K/UL — HIGH (ref 1–3.3)
LYMPHOCYTES # BLD AUTO: 35.1 % — SIGNIFICANT CHANGE UP (ref 13–44)
MCHC RBC-ENTMCNC: 28.1 PG — SIGNIFICANT CHANGE UP (ref 27–34)
MCHC RBC-ENTMCNC: 32.7 GM/DL — SIGNIFICANT CHANGE UP (ref 32–36)
MCV RBC AUTO: 86 FL — SIGNIFICANT CHANGE UP (ref 80–100)
MONOCYTES # BLD AUTO: 0.6 K/UL — SIGNIFICANT CHANGE UP (ref 0–0.9)
MONOCYTES NFR BLD AUTO: 5.6 % — SIGNIFICANT CHANGE UP (ref 2–14)
NEUTROPHILS # BLD AUTO: 5.83 K/UL — SIGNIFICANT CHANGE UP (ref 1.8–7.4)
NEUTROPHILS NFR BLD AUTO: 54.5 % — SIGNIFICANT CHANGE UP (ref 43–77)
NRBC # BLD: 0 /100 WBCS — SIGNIFICANT CHANGE UP (ref 0–0)
PLATELET # BLD AUTO: 394 K/UL — SIGNIFICANT CHANGE UP (ref 150–400)
POTASSIUM SERPL-MCNC: 3.8 MMOL/L — SIGNIFICANT CHANGE UP (ref 3.5–5.3)
POTASSIUM SERPL-SCNC: 3.8 MMOL/L — SIGNIFICANT CHANGE UP (ref 3.5–5.3)
PROT SERPL-MCNC: 7.4 G/DL — SIGNIFICANT CHANGE UP (ref 6–8.3)
RBC # BLD: 4.3 M/UL — SIGNIFICANT CHANGE UP (ref 3.8–5.2)
RBC # FLD: 13 % — SIGNIFICANT CHANGE UP (ref 10.3–14.5)
SARS-COV-2 RNA SPEC QL NAA+PROBE: SIGNIFICANT CHANGE UP
SODIUM SERPL-SCNC: 136 MMOL/L — SIGNIFICANT CHANGE UP (ref 135–145)
WBC # BLD: 10.7 K/UL — HIGH (ref 3.8–10.5)
WBC # FLD AUTO: 10.7 K/UL — HIGH (ref 3.8–10.5)

## 2022-12-17 PROCEDURE — 99284 EMERGENCY DEPT VISIT MOD MDM: CPT

## 2022-12-17 NOTE — ED ADULT NURSE NOTE - OBJECTIVE STATEMENT
sent by therapist for Psychiatry evaluation; per pt "I was angry earlier that I punched the wall" denies SI at this time; admits smoking marijuana this morning; denies other drug or ETOH use

## 2022-12-17 NOTE — ED PROVIDER NOTE - CRITERIA ADMIT PEDS PT
Anesthesia Pre Eval Note    Anesthesia ROS/Med Hx    Overall Review:  EKG was reviewed and Echo was reviewed     Anesthetic Complication History:  Patient does not have a history of anesthetic complications      Pulmonary Review:  Patient does not have a pulmonary history      Neuro/Psych Review:  Patient does not have a neuro/psych history       Cardiovascular Review:  Exercise tolerance: good (>4 METS)  Positive for hypertension - well controlled  Positive for hyperlipidemia    GI/HEPATIC/RENAL Review:  Patient does not have a GI/hepatic/renalhistory       End/Other Review:  Positive for diabetes - well controlled  Positive for obesity class I - 30.00 - 34.99      Relevant Problems   No relevant active problems       Physical Exam     Airway   Mallampati: II  TM Distance: >3 FB  Neck ROM: Full  Neck: Non-tender and Able to place in sniff position  TMJ Mobility: Good    Cardiovascular  Cardiovascular exam normal  Cardio Rhythm: Regular  Cardio Rate: Normal    Head Assessment  Head assessment: Normocephalic and Atraumatic    General Assessment  General Assessment: Alert and oriented and No acute distress    Dental Exam  Dental exam normal  Patient has:  Lower Removable Equipment and Upper Removable Equipment  Dental Note: Partial removable dentures of upper and lower teeth, denoted as missing in image. Legend: C=Chipped  M=Missing  L=Loose B=Bridge Cr=[place] I=Implant    Pulmonary Exam  Pulmonary exam normal  Breath sounds clear to auscultation:  Yes  Patient Demonstrates:  Non-labored Breathing    Abdominal Exam    Patient Demonstrates:  Obese      Anesthesia Plan:    ASA Status: 2  Anesthesia Type: MAC    Induction: Intravenous  Preferred Airway Type: Nasal CannulaPatient does not have a difficult airway or is not at risk of aspiration. Maintenance: TIVA  Premedication: IV  Patient does not have an implantable electronic device requiring post procedure programming.      Post-op Pain Management: Per Surgeon      Checklist  Reviewed: NPO Status, Allergies, Medications, Problem list, Past Med History, Patient Summary and EKG  Consent/Risks Discussed Statement:  The proposed anesthetic plan, including its risks and benefits, have been discussed with the Patient along with the risks and benefits of alternatives. Questions were encouraged and answered and the patient and/or representative understands and agrees to proceed. I have discussed elements of the patient's history or examination, as noted above and/or as follows, that place the patient at higher risk of complications; age, BMI> 27 (obesity) and heart disease. I discussed with the patient (and/or patient's legal representative) the risks and benefits of the proposed anesthesia plan, MAC, which may include services performed by other anesthesia providers. Alternative anesthesia plans, if available, were reviewed with the patient (and/or patient's legal representative). Discussion has been held with the patient (and/or patient's legal representative) regarding risks of anesthesia, which include Intra-operative Awareness, allergic reaction, anxiety, conversion to general anesthesia, hypotension, nausea and vomiting and emergent situations that may require change in anesthesia plan. The patient (and/or patient's legal representative) has indicated understanding, his/her questions have been answered, and he/she wishes to proceed with the planned anesthetic. Informed Consent for Blood: Consented  Blood Products: Not Anticipated    Comments  Plan Comments: In chart review it was noted that the patient was a difficult intubation. Upon further review and detailed conversation with the patient regarding their PMHx and past surgical history, the patient has denied any complications with anesthesia including not allergic reactions, no adverse outcomes, and no difficulty had with intubations. She also denies any PONV when given anesthetics. No

## 2022-12-17 NOTE — ED PROVIDER NOTE - PROGRESS NOTE DETAILS
Discussed with [facility], will see shortly. Pt seen by [facility], they plan to contact pt's therapist/family for collateral info, and re-evaluation in morning. [foster] no interim change. pending psych reeval in AM. chloé: pt received at sign out as pending psych eval / dispo, plan is admit

## 2022-12-17 NOTE — ED PROVIDER NOTE - NS ED ATTENDING STATEMENT MOD
This was a shared visit with the LEONELA. I reviewed and verified the documentation and independently performed the documented:

## 2022-12-17 NOTE — ED ADULT TRIAGE NOTE - BSA (M2)
Provider reviewed discharge instructions with the patient. The patient verbalized understanding. Pt left ED ambulatory with discharge papers in hand.
2.27
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
## 2022-12-17 NOTE — ED PROVIDER NOTE - PHYSICAL EXAMINATION
CONSTITUTIONAL: NAD   SKIN: Normal color and turgor.    HEAD: NC/AT.  EYES: Conjunctiva clear. EOMI. PERRL.    ENT: Airway clear. Normal voice.   RESPIRATORY:  Normal work of breathing. Lungs CTAB.  CARDIOVASCULAR:  RRR, S1S2. No M/R/G.      GI:  Abdomen soft, nontender.    MSK: Neck supple.  No LE edema or calf tenderness. Tender over 3-5th MCP of left hand, most tender over 4th MCP.  No swelling, no breaks in skin.  ROM normal.   NEURO: Alert; CN: grossly intact. Speech clear.  MCCAIN. Gait steady.
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
## 2022-12-18 ENCOUNTER — INPATIENT (INPATIENT)
Facility: HOSPITAL | Age: 22
LOS: 1 days | Discharge: HOME | DRG: 883 | End: 2022-12-20
Attending: PSYCHIATRY & NEUROLOGY | Admitting: PSYCHIATRY & NEUROLOGY
Payer: COMMERCIAL

## 2022-12-18 DIAGNOSIS — F60.3 BORDERLINE PERSONALITY DISORDER: ICD-10-CM

## 2022-12-18 LAB
AMPHET UR-MCNC: NEGATIVE — SIGNIFICANT CHANGE UP
APPEARANCE UR: CLEAR — SIGNIFICANT CHANGE UP
BACTERIA # UR AUTO: PRESENT /HPF
BARBITURATES UR SCN-MCNC: NEGATIVE — SIGNIFICANT CHANGE UP
BENZODIAZ UR-MCNC: NEGATIVE — SIGNIFICANT CHANGE UP
BILIRUB UR-MCNC: NEGATIVE — SIGNIFICANT CHANGE UP
COCAINE METAB.OTHER UR-MCNC: NEGATIVE — SIGNIFICANT CHANGE UP
COLOR SPEC: YELLOW — SIGNIFICANT CHANGE UP
DIFF PNL FLD: NEGATIVE — SIGNIFICANT CHANGE UP
EPI CELLS # UR: ABNORMAL /HPF (ref 0–5)
GLUCOSE UR QL: NEGATIVE — SIGNIFICANT CHANGE UP
HCG UR QL: NEGATIVE — SIGNIFICANT CHANGE UP
KETONES UR-MCNC: NEGATIVE — SIGNIFICANT CHANGE UP
LEUKOCYTE ESTERASE UR-ACNC: ABNORMAL
METHADONE UR-MCNC: NEGATIVE — SIGNIFICANT CHANGE UP
NITRITE UR-MCNC: NEGATIVE — SIGNIFICANT CHANGE UP
OPIATES UR-MCNC: NEGATIVE — SIGNIFICANT CHANGE UP
PCP SPEC-MCNC: SIGNIFICANT CHANGE UP
PCP UR-MCNC: NEGATIVE — SIGNIFICANT CHANGE UP
PH UR: 6 — SIGNIFICANT CHANGE UP (ref 5–8)
PROT UR-MCNC: NEGATIVE MG/DL — SIGNIFICANT CHANGE UP
RBC CASTS # UR COMP ASSIST: < 5 /HPF — SIGNIFICANT CHANGE UP
SP GR SPEC: >=1.03 — SIGNIFICANT CHANGE UP (ref 1–1.03)
THC UR QL: POSITIVE
UROBILINOGEN FLD QL: 0.2 E.U./DL — SIGNIFICANT CHANGE UP
WBC UR QL: ABNORMAL /HPF

## 2022-12-18 PROCEDURE — 90792 PSYCH DIAG EVAL W/MED SRVCS: CPT | Mod: 95

## 2022-12-18 PROCEDURE — 73130 X-RAY EXAM OF HAND: CPT | Mod: 26,LT

## 2022-12-18 RX ORDER — DIPHENHYDRAMINE HCL 50 MG
50 CAPSULE ORAL AT BEDTIME
Refills: 0 | Status: DISCONTINUED | OUTPATIENT
Start: 2022-12-18 | End: 2022-12-20

## 2022-12-18 RX ORDER — ESCITALOPRAM OXALATE 10 MG/1
10 TABLET, FILM COATED ORAL DAILY
Refills: 0 | Status: DISCONTINUED | OUTPATIENT
Start: 2022-12-18 | End: 2022-12-19

## 2022-12-18 RX ORDER — INFLUENZA VIRUS VACCINE 15; 15; 15; 15 UG/.5ML; UG/.5ML; UG/.5ML; UG/.5ML
0.5 SUSPENSION INTRAMUSCULAR ONCE
Refills: 0 | Status: DISCONTINUED | OUTPATIENT
Start: 2022-12-18 | End: 2022-12-20

## 2022-12-18 RX ADMIN — Medication 50 MILLIGRAM(S): at 23:08

## 2022-12-18 NOTE — ED BEHAVIORAL HEALTH PROGRESS NOTE - RISK ASSESSMENT
risk factors: hx of behavioral dysregulation, past suicidal behavior, past hospitalizations, chronic poor emotional regulation & impulsivity, personality disorder    protective: supportive treatment team, pt motivated & help-seeking    Patient recently with acute stressors and reported increase in thoughts to self harm although with no action taken.
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
## 2022-12-18 NOTE — BH PATIENT PROFILE - HOME MEDICATIONS
ibuprofen 600 mg oral tablet , 1 tab(s) orally every 6 hours  acetaminophen 325 mg oral tablet , 3 tab(s) orally every 6 hours
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
## 2022-12-18 NOTE — ED BEHAVIORAL HEALTH ASSESSMENT NOTE - RISK ASSESSMENT
risk factors: hx of behavioral dysregulation, past suicidal behavior, past hospitalizations, chronic poor emotional regulation & impulsivity, personality disorder    protective: supportive treatment team, pt motivated & help-seeking

## 2022-12-18 NOTE — ED ADULT NURSE REASSESSMENT NOTE - NS ED NURSE REASSESS COMMENT FT1
pt environment checked with safety checklist, pt safety maintained. Pt received bed on 8uris, endorsement given, awaiting transport.
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
## 2022-12-18 NOTE — ED BEHAVIORAL HEALTH PROGRESS NOTE - DETAILS
alerted ED team of patient admission Alerted nursing staff and inpatient provider who are in agreement

## 2022-12-18 NOTE — ED BEHAVIORAL HEALTH NOTE - BEHAVIORAL HEALTH NOTE
========================     FOR EACH COLLATERAL     ========================     Collateral below has requested that the information provided remain confidential: Yes [  ] No [ X ]     Collateral below has provided information that patient is/may be unaware of: Yes [  ] No [ X ]     Patient gives permission to obtain collateral from _____:     ( X ) Yes     (  )  No     Rationale for overriding objection               ( X ) Lack of capacity. Details: BTCM attempted to contact and left a voicemail.                (  ) Assessing risk of danger to self/others. Details: ________     Rationale for selecting specific collateral source               (  ) Potential to impact risk of danger to self/others and no alternative equivalent. Details: _____     NAME: Aye Correa      NUMBER: [phone]     RELATIONSHIP: Mother      RELIABILITY: Unreliable
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
## 2022-12-18 NOTE — ED BEHAVIORAL HEALTH PROGRESS NOTE - SUMMARY
Patient has a prior history of SI, SA with none recently she was last admitted to [facility] in 07/2021  Psychiatric history including 9 previous psychiatric hospitalizations , 4 prior suicide attempts (all resulting in no physical harm but resulted in psychiatric admission), substance abuse with one admission to rehab in 2017 (for alcohol and cannabis),

## 2022-12-18 NOTE — ED ADULT NURSE REASSESSMENT NOTE - NS ED NURSE REASSESS COMMENT FT1
pt environment checked using safety checklist. Pt safety maintained, 1:1 CO maintained. Pt awaiting AM psych consult. No complaints at this time.

## 2022-12-18 NOTE — BH PATIENT PROFILE - NSICDXPASTSURGICALHX_GEN_ALL_CORE_FT
Consultation Note    Patient Name: Niles Grayson  : 1967  Age: 47 y.o. Admitting Physician: Edu Box MD   Date of Admission: 2022  4:56 PM   Primary Care Physician: None Provider        Niles Grayson is being seen at the request of Edu Box MD for BRBPR, hx of cirrhosis. History of Present Illness:  48 yo M w/ PMHx of Hep C Cirrhosis decompensated with Ascites. Other history reported to have HBV on Tenofovir. Abdominal Surgeries include Appendectomy, Cholecystectomy. Patient presented to ED with progressive abdominal pain. Patient presented to the ER last night with complaints of BRBPR, abd distention, abdominal pain, and early satiety. Patient with known history of cirrhosis--Hep C, Hep B, and EtOH use. He was seen last week for similar complaints. At that time he reported BRBPR after passing hard stools and thought to be outlet bleeding. Imaging last week without ascites. [place] with decompensated cirrhosis and underwent large volume paracentesis. He is experiencing increased pain with the distention. Reports having 3-4 small, hard bowel movements per day. CT yesterday without acute abnormality, no ascites seen. No leukocytosis noted. Afebrile. Reports an episode of nonbloody emesis yesterday. GI History:  · Denies prior EGD   · Colonoscopy reported by patient, approximately 15 years ago with history of Polyps. · Denies any FHx of GI malignancies  · Prior Heavy ETOH abuse, averaging 1 Pint [place] Daily  · Smoke 1 PPD Cigarettes. Past Medical History:  Past Medical History:   Diagnosis Date    Cirrhosis ([facility] [address]     Hepatitis C         Past Surgical History:  Past Surgical History:   Procedure Laterality Date    APPENDECTOMY      CHOLECYSTECTOMY      LEG AMPUTATION BELOW KNEE Right     TONSILLECTOMY          Historical Medications:  Prior to Visit Medications    Medication Sig Taking?  Authorizing Provider escitalopram (LEXAPRO) 5 MG tablet Take 2.5 mg by mouth 4 times daily Yes Historical Provider, MD   doxepin (SINEQUAN) 10 MG capsule Take 10 mg by mouth nightly Yes Historical Provider, MD   lactulose encephalopathy (GENERLAC) 10 GM/15ML SOLN solution Take 30 g by mouth 2 times daily Yes Historical Provider, MD   propranolol (INDERAL) 10 MG tablet Take 10 mg by mouth 2 times daily Yes Historical Provider, MD   entecavir (BARACLUDE) 1 MG tablet   Historical Provider, MD   omeprazole (PRILOSEC) 40 MG delayed release capsule TAKE 1 CAPSULE 30 MINUTES BEFORE MORNING MEAL ORALLY TWICE A DAY 30 DAY(S)  Historical Provider, MD   furosemide (LASIX) 40 MG tablet Take 1 tablet by mouth daily  Roslyn Guthrie MD   levoFLOXacin (LEVAQUIN) 500 MG tablet Take 1 tablet by mouth daily for 5 days  Roslyn Guthrie MD   omeprazole (PRILOSEC) 20 MG delayed release capsule Take 20 mg by mouth daily  Historical Provider, MD   tenofovir disoproxil fumarate (VIREAD) 300 MG tablet Take 300 mg by mouth daily  Historical Provider, MD   folic acid (FOLVITE) 1 MG tablet Take 1 mg by mouth daily  Historical Provider, MD   spironolactone (ALDACTONE) 50 MG tablet Take 50 mg by mouth daily  Historical Provider, MD   Thiamine HCl (B-1) 100 MG TABS Take by mouth  Historical Provider, MD        Hospital Medications:  Current Facility-Administered Medications: sodium chloride flush 0.9 % injection 5-40 mL, 5-40 mL, IntraVENous, 2 times per day  sodium chloride flush 0.9 % injection 5-40 mL, 5-40 mL, IntraVENous, PRN  0.9 % sodium chloride infusion, , IntraVENous, PRN  ondansetron (ZOFRAN-ODT) disintegrating tablet 4 mg, 4 mg, Oral, Q8H PRN **OR** ondansetron (ZOFRAN) injection 4 mg, 4 mg, IntraVENous, Q6H PRN  acetaminophen (TYLENOL) tablet 650 mg, 650 mg, Oral, Q6H PRN **OR** acetaminophen (TYLENOL) suppository 650 mg, 650 mg, Rectal, Q6H PRN  0.9 % sodium chloride infusion, , IntraVENous, Continuous  [DISCONTINUED] HYDROmorphone (DILAUDID) injection 0.25 mg, 0.25 mg, IntraVENous, Q3H PRN **OR** HYDROmorphone (DILAUDID) injection 0.5 mg, 0.5 mg, IntraVENous, Q3H PRN  doxepin (SINEQUAN) capsule 10 mg, 10 mg, Oral, Nightly  entecavir (BARACLUDE) tablet 1 mg, 1 mg, Oral, Daily  escitalopram (LEXAPRO) tablet 2.5 mg, 2.5 mg, Oral, 4x Daily  folic acid (FOLVITE) tablet 1 mg, 1 mg, Oral, Daily  furosemide (LASIX) tablet 40 mg, 40 mg, Oral, Daily  lactulose (CHRONULAC) 10 GM/15ML solution 30 g, 30 g, Oral, BID  levoFLOXacin (LEVAQUIN) tablet 500 mg, 500 mg, Oral, Daily  pantoprazole (PROTONIX) tablet 40 mg, 40 mg, Oral, QAM AC  propranolol (INDERAL) tablet 10 mg, 10 mg, Oral, BID  spironolactone (ALDACTONE) tablet 50 mg, 50 mg, Oral, Daily  tenofovir disoproxil fumarate (VIREAD) tablet 300 mg, 300 mg, Oral, Daily  thiamine tablet 100 mg, 100 mg, Oral, Daily  nicotine (NICODERM CQ) 21 MG/24HR 1 patch, 1 patch, TransDERmal, Daily  polyethylene glycol (GoLYTELY) solution 4,000 mL, 4,000 mL, Oral, Once  HYDROcodone-acetaminophen (NORCO) 5-325 MG per tablet 1 tablet, 1 tablet, Oral, Q6H PRN  albuterol sulfate HFA (PROVENTIL;VENTOLIN;PROAIR) 108 (90 Base) MCG/ACT inhaler 2 puff, 2 puff, Inhalation, 4x Daily PRN     Social History:   Social History     Tobacco History     Smoking Status  Current Every Day Smoker Smoking Frequency  1 pack/day    Smokeless Tobacco Use  Never Used          Alcohol History     Alcohol Use Status  Not Currently          Drug Use     Drug Use Status  Yes Types  Marijuana (Weed)          Sexual Activity     Sexually Active  Not Asked                 Family History:  History reviewed. No pertinent family history.      Allergies:  No Known Allergies     ROS:   General: No fever or weight change  Hematologic: No unexpected submucosal bleeding or bruising  HEENT: No sore throat or facial pain  Respiratory: No cough or dyspnea  Cardiovascular: No angina or dependent edema  Gastrointestinal: See HPI  Musculoskeletal: No usual joint pain or stiffness  Skin: No skin eruptions or changing lesions  Neurologic: No focal weakness or numbness  Psychiatric: No anxiety or sleep disturbance    Physical Exam:  Vital Signs:   Vitals:    06/21/22 1459   BP:    Pulse:    Resp: 20   Temp:    SpO2:        General: Well-nourished, well-developed  HEENT: Sclera anicteric, mucosal membranes moist  Cardiovascular: Regular rate and rhythm. No murmurs. Respiratory: Respirations nonlabored, no crepitus  GI: Abdomen softly distended. Diffuse tenderness with palpation. Normal active bowel sounds. No masses palpable. Rectal: Deferred  Musculoskeletal: No pitting edema of the lower legs. Neurological: Gross memory appears intact. Patient is alert and oriented    Recent labs and imaging reviewed. Assessment:    46 yo M w/ PMHx of Hep C Cirrhosis decompensated with Ascites. Other history reported to have HBV on Tenofovir. Abdominal Surgeries include Appendectomy, Cholecystectomy. Presents with worsening abdominal distention, pain, and early satiety. Also reports BRBPR. Hgb stable. CT without acute abnormality. Large volume paracentesis 2 weeks ago. No ascites on imaging yesterday. Plan:  1. Paracentesis ordered. If no evidence of ascites will proceed with EGD/Colonoscopy tomorrow  2. Clear liquid diet today  3. Continue diuretics  4. Will add Miralax to bowel regimen  5. Low sodium diet  6. Supportive care       Barry Duarte [facility].  Also available via [employer] PAST SURGICAL HISTORY:  No significant past surgical history

## 2022-12-18 NOTE — ED BEHAVIORAL HEALTH PROGRESS NOTE - COLLATERAL INFORMATION (NAME, PHONE, RELATIONSHIP):
Aye Correa [phone] mother: She stated that the patient had not been doing well over the last several weeks and that she would become intermittently agitated going from "0 to 100" quickly, punching walls." She stated that she felt the patients current medications have not been helpful which are Lexapro 10 mg. They stated that the current argument stemmed from planning a trip with family. No suicidal gestures or self harm recently. She reports stated SI but does not think she would kill herself at this time.

## 2022-12-18 NOTE — ED BEHAVIORAL HEALTH NOTE - BEHAVIORAL HEALTH NOTE
==================           PRE-HOSPITAL COURSE           ===================          SOURCE:  Secondhand EMR documentation.           DETAILS:  Patient presents self to ED; chief complaint of psych eval.           ==============       ED COURSE:           ===========           SOURCE:  RN and secondhand EMR documentation.            ARRIVAL:  Patient noted to be cooperative with ED protocol. Patient gowned, wanded, and placed in private room on 1:1 for consult. Patient presents with good hygiene/grooming.            BELONGINGS:  None notable.           BEHAVIOR: Blood provided for routine labs without noted incident. No SI/HI/AH/VH elicited per RN. Patient is alert, oriented, and makes eye contact; speech of normal volume and rate accompanied by logical thought process. Patient has been in hospital chair while in ED; presents as calm and interacting well with ED staff.       TREATMENT: Patient has not required medication intervention while in ED; remains in behavioral control.     Visitors: Patient presently unaccompanied by social supports while in ED.

## 2022-12-19 DIAGNOSIS — F43.10 POST-TRAUMATIC STRESS DISORDER, UNSPECIFIED: ICD-10-CM

## 2022-12-19 PROCEDURE — 99223 1ST HOSP IP/OBS HIGH 75: CPT

## 2022-12-19 RX ORDER — ESCITALOPRAM OXALATE 10 MG/1
15 TABLET, FILM COATED ORAL DAILY
Refills: 0 | Status: DISCONTINUED | OUTPATIENT
Start: 2022-12-20 | End: 2022-12-20

## 2022-12-19 RX ADMIN — ESCITALOPRAM OXALATE 10 MILLIGRAM(S): 10 TABLET, FILM COATED ORAL at 10:31

## 2022-12-19 RX ADMIN — Medication 1 MILLIGRAM(S): at 10:29

## 2022-12-19 RX ADMIN — Medication 50 MILLIGRAM(S): at 21:41

## 2022-12-19 NOTE — BH INPATIENT PSYCHIATRY ASSESSMENT NOTE - NSBHATTESTCOMMENTATTENDFT_PSY_A_CORE
I have treated patient several times in the past both on CL and inpatient. I have met with pt's mother and her sister in the past. Spoke at length with her outpatient therapist, Dr. Rosita South. Pt is at baseline. Can increase her lexapro to 15 mg. Patient asked about Rexulti but will discharge pt no later than Wednesday so I don't think starting Rexulti during this admit would be feasible. I think patient living independently would help immeasurably as interpersonal difficulties with mom and sister about who is best able to care for baby Phoenix.

## 2022-12-19 NOTE — BH INPATIENT PSYCHIATRY ASSESSMENT NOTE - NSBHMETABOLIC_PSY_ALL_CORE_FT
BMI: BMI (kg/m2): 39.4 (12-18-22 @ 11:36)  HbA1c:   Glucose:   BP: 126/88 (12-19-22 @ 08:40) (109/70 - 126/88)  Lipid Panel:  BMI: BMI (kg/m2): 39.4 (12-18-22 @ 11:36)  HbA1c:   Glucose:   BP: 133/85 (12-19-22 @ 17:20) (109/70 - 133/85)  Lipid Panel:  BMI: BMI (kg/m2): 39.4 (12-18-22 @ 11:36)  HbA1c:   Glucose:   BP: 129/84 (12-20-22 @ 08:35) (109/70 - 133/85)  Lipid Panel:

## 2022-12-19 NOTE — BH INPATIENT PSYCHIATRY ASSESSMENT NOTE - NSBHCRANIAL_PSY_ALL_CORE
Recognizes 2 fingers or can read (II)/Smiles, shows teeth, opens mouth, sticks out tongue (V, VII, XI)/Normal speech (IX, X, XII)/Extraocular Eye Movement Intact  (III, IV, VI)/Shoulder shrug (XI)/Hearing intact (VIII)

## 2022-12-19 NOTE — BH INPATIENT PSYCHIATRY ASSESSMENT NOTE - CURRENT MEDICATION
MEDICATIONS  (STANDING):  escitalopram 10 milliGRAM(s) Oral daily  influenza   Vaccine 0.5 milliLiter(s) IntraMuscular once    MEDICATIONS  (PRN):  diphenhydrAMINE 50 milliGRAM(s) Oral at bedtime PRN insomnia  LORazepam     Tablet 1 milliGRAM(s) Oral two times a day PRN Agitation   MEDICATIONS  (STANDING):  influenza   Vaccine 0.5 milliLiter(s) IntraMuscular once    MEDICATIONS  (PRN):  diphenhydrAMINE 50 milliGRAM(s) Oral at bedtime PRN insomnia  LORazepam     Tablet 1 milliGRAM(s) Oral two times a day PRN Agitation   MEDICATIONS  (STANDING):  escitalopram 15 milliGRAM(s) Oral daily  influenza   Vaccine 0.5 milliLiter(s) IntraMuscular once    MEDICATIONS  (PRN):  diphenhydrAMINE 50 milliGRAM(s) Oral at bedtime PRN insomnia  LORazepam     Tablet 1 milliGRAM(s) Oral two times a day PRN Agitation

## 2022-12-19 NOTE — BH INPATIENT PSYCHIATRY ASSESSMENT NOTE - NSBHCHARTREVIEWVS_PSY_A_CORE FT
Vital Signs Last 24 Hrs  T(C): 37.2 (12-19-22 @ 08:40), Max: 37.2 (12-19-22 @ 08:40)  T(F): 98.9 (12-19-22 @ 08:40), Max: 98.9 (12-19-22 @ 08:40)  HR: 81 (12-19-22 @ 08:40) (74 - 81)  BP: 126/88 (12-19-22 @ 08:40) (119/84 - 126/88)  BP(mean): --  RR: 18 (12-19-22 @ 08:40) (18 - 18)  SpO2: 97% (12-19-22 @ 08:40) (97% - 99%)     Vital Signs Last 24 Hrs  T(C): 37.1 (12-19-22 @ 17:20), Max: 37.2 (12-19-22 @ 08:40)  T(F): 98.7 (12-19-22 @ 17:20), Max: 98.9 (12-19-22 @ 08:40)  HR: 88 (12-19-22 @ 17:20) (81 - 88)  BP: 133/85 (12-19-22 @ 17:20) (126/88 - 133/85)  BP(mean): --  RR: 18 (12-19-22 @ 17:20) (18 - 18)  SpO2: 98% (12-19-22 @ 17:20) (97% - 98%)     Vital Signs Last 24 Hrs  T(C): 37.2 (12-20-22 @ 08:35), Max: 37.2 (12-20-22 @ 08:35)  T(F): 98.9 (12-20-22 @ 08:35), Max: 98.9 (12-20-22 @ 08:35)  HR: 84 (12-20-22 @ 08:35) (84 - 88)  BP: 129/84 (12-20-22 @ 08:35) (129/84 - 133/85)  BP(mean): --  RR: 18 (12-20-22 @ 08:35) (18 - 18)  SpO2: 98% (12-20-22 @ 08:35) (98% - 98%)

## 2022-12-19 NOTE — BH INPATIENT PSYCHIATRY ASSESSMENT NOTE - NSSUICPROTFACT_PSY_ALL_CORE
Responsibility to children, family, or others/Identifies reasons for living/Fear of death or the actual act of killing self/Cultural, spiritual and/or moral attitudes against suicide/Ep[employer]opalian beliefs
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
## 2022-12-19 NOTE — BH INPATIENT PSYCHIATRY ASSESSMENT NOTE - NSBHPHYSICALEXAM_PSY_ALL_CORE
GENERAL: NAD, comfortable, ambulating   HEAD:  Atraumatic, Normocephalic   HEENT: EOMI, PERRLA, conjunctiva and sclera clear; moist mucous membranes   NECK: Supple, No JVD   RESPIRATORY: Clear to auscultation bilaterally; No rales, rhonchi, wheezing, or rubs. Unlabored respirations, good air entry   CARDIAC: Regular rate and rhythm; No murmurs, rubs, or gallops; S1 and S2 appreciated   ABDOMEN: BSx4; Soft, nontender, nondistended; no guarding, no rebound   EXTREMITIES:  No clubbing, cyanosis, or edema   MSK: 5/5 strength in upper and lower extremities bilaterally   NERVOUS SYSTEM:  alert and oriented x3, no focal deficits, CN II-XII intact, normal gait   SKIN: No rashes or lesions

## 2022-12-19 NOTE — BH INPATIENT PSYCHIATRY ASSESSMENT NOTE - VIOLENCE PROTECTIVE FACTORS:
Residential stability/Engagement in treatment/Insight into violence risk and need for management/treatment/Good treatment response/compliance

## 2022-12-19 NOTE — BH INPATIENT PSYCHIATRY ASSESSMENT NOTE - RISK ASSESSMENT
The patient is a 23 yo woman, mother to an 11mo old daughter, domiciled with her mother and older sister, employed as a substitute para-professional, with PMH scoliosis, PPH Borderline Personality Disorder, MDD, multiple psychiatric hospitalizations, prior suicidal behavior (most recent 2016 overdosed on meds), hx of cutting when younger, who is BIB EMS activated by mom due to emotional dysregulation & agitation in setting of fight with sister.    Her presentation seems consistent with her borderline personality diagnosis and chronic pattern of interpersonal hypersensitivity, mood reactivity, and dissociation. She reports larger mood swings, more anger, and more interpersonal conflict. Collateral from family, inpatient team, and previous inpatient psychiatrist confirms this. On MSE, pt appropriately dressed and able to maintain good eye contact. Affect euthymic. Linear, organized in thought/behavior, insight/judgement/impulse control fair, AOx3, concentration/attention/recall wnl. Pt w/ chronic/nonmodifiable risks to self/others including hx psychiatric dx, hx of inpatient psychiatric care, and impulsivity. No active SI/HI at time of evaluation. Risk mitigated by medication management, therapeutic milieu, active safety planning along w/ pts sense of agency and willingness to advocate for self.  It appears that the patient has a heightened risk to herself beyond the usual chronic risk factors associated with her diagnosis and history. She signed a 9.13 form for admission.  Lexapro was increased from 10 mg to 10 mg daily for mood on 12/20/22.     Plan:  - Continue admission under 9.13  - Increase Lexapro to 15 mg (starting 12/20)
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
## 2022-12-19 NOTE — BH INPATIENT PSYCHIATRY ASSESSMENT NOTE - NSCOMMENTSUICRISKFACT_PSY_ALL_CORE
Currently denies suicidal thoughts/actions/behaviors, is forward-thinking about the future, and easily is able to identify reasons for living

## 2022-12-19 NOTE — BH SOCIAL WORK INITIAL PSYCHOSOCIAL EVALUATION - OTHER PAST PSYCHIATRIC HISTORY (INCLUDE DETAILS REGARDING ONSET, COURSE OF ILLNESS, INPATIENT/OUTPATIENT TREATMENT)
multiple hospitalizations (last Jan 2022 at [facility])  Follows with outpatient providers a/w [facility] - Dr. Cuellar (therapist), Dr. Guzman (psychiatrist)  overdose in 2016. Reports also running into to traffic and cutting in the past

## 2022-12-19 NOTE — BH INPATIENT PSYCHIATRY ASSESSMENT NOTE - DETAILS
not asked, hx of PTSD in chart in 2016 overdosed on pills. Reports prior to this also running into traffic, cutting wrist.

## 2022-12-20 ENCOUNTER — APPOINTMENT (OUTPATIENT)
Dept: PSYCHIATRY | Facility: CLINIC | Age: 22
End: 2022-12-20

## 2022-12-20 VITALS
OXYGEN SATURATION: 98 % | DIASTOLIC BLOOD PRESSURE: 84 MMHG | RESPIRATION RATE: 18 BRPM | HEART RATE: 84 BPM | WEIGHT: 265 LBS | TEMPERATURE: 99 F | SYSTOLIC BLOOD PRESSURE: 129 MMHG

## 2022-12-20 PROCEDURE — 81025 URINE PREGNANCY TEST: CPT

## 2022-12-20 PROCEDURE — 80307 DRUG TEST PRSMV CHEM ANLYZR: CPT

## 2022-12-20 PROCEDURE — 36415 COLL VENOUS BLD VENIPUNCTURE: CPT

## 2022-12-20 PROCEDURE — 73130 X-RAY EXAM OF HAND: CPT

## 2022-12-20 PROCEDURE — 80053 COMPREHEN METABOLIC PANEL: CPT

## 2022-12-20 PROCEDURE — 87635 SARS-COV-2 COVID-19 AMP PRB: CPT

## 2022-12-20 PROCEDURE — 85025 COMPLETE CBC W/AUTO DIFF WBC: CPT

## 2022-12-20 PROCEDURE — 99285 EMERGENCY DEPT VISIT HI MDM: CPT

## 2022-12-20 PROCEDURE — 99239 HOSP IP/OBS DSCHRG MGMT >30: CPT

## 2022-12-20 PROCEDURE — 81001 URINALYSIS AUTO W/SCOPE: CPT

## 2022-12-20 RX ORDER — ESCITALOPRAM OXALATE 10 MG/1
1 TABLET, FILM COATED ORAL
Qty: 30 | Refills: 0
Start: 2022-12-20 | End: 2023-01-18

## 2022-12-20 RX ORDER — ESCITALOPRAM OXALATE 10 MG/1
3 TABLET, FILM COATED ORAL
Qty: 90 | Refills: 0
Start: 2022-12-20 | End: 2023-01-18

## 2022-12-20 RX ADMIN — ESCITALOPRAM OXALATE 15 MILLIGRAM(S): 10 TABLET, FILM COATED ORAL at 10:59
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
## 2022-12-20 NOTE — BH PSYCHOLOGY - CLINICIAN PSYCHOTHERAPY NOTE - NSBHPSYCHOLPARTICIPCOMMENT_PSY_A_CORE FT
Ms. Correa spoke openly and with increased insight and understanding about the factors that contributed to her current hospitalization, for example linking experiences in yesterday's group therapy that illuminated the role of past traumas occuring around holidays, on her increased emotional lability and impulsivity over the last few days. "I was kicked out of home as a teenager at [facility], and for so long I thought what's the point of the holidays... now I have phoenix and there is a new point to things, but it's hard to let go of the old feelings." discussed ways to help her family understand how her behaviors have different intentions than they did in the past -- e.g. as a teenager she would leave home and disappear for weeks; now she leaves for a short time, to re-group and cope with intense feelings. She reports having productive conversatinos with her mom and sister since her admission, and she feels like they have all benefitted from her seeking a higher level of care for herself for a few days. "I am showing that it's okay to ask for help." Also discussed the importance of returning home today, so that her family's work schedule is not further disrupted, and they can prepare for the holidays together. Explored additions to her treatment, in the short term that includes joining an art therapy group, and planning for twice weekly sessions; in the long term, she hopes to join the [facility] program. Discussed how to shift our work going forwards to best support her needs, and she feels ready to engage in therapy more routinely and more deeply.  
Ms. Correa is easily engaged, eager to engage in treatment (individual and group modalities), open about current/recent difficulties

## 2022-12-20 NOTE — BH INPATIENT PSYCHIATRY DISCHARGE NOTE - NSDCMRMEDTOKEN_GEN_ALL_CORE_FT
escitalopram 5 mg oral tablet: 3 tab(s) orally once a day   Lexapro 10 mg oral tablet: 1 tab(s) orally once a day   Lexapro 5 mg oral tablet: 1 tab(s) orally once a day

## 2022-12-20 NOTE — BH INPATIENT PSYCHIATRY DISCHARGE NOTE - HPI (INCLUDE ILLNESS QUALITY, SEVERITY, DURATION, TIMING, CONTEXT, MODIFYING FACTORS, ASSOCIATED SIGNS AND SYMPTOMS)
The patient is a 21 yo woman, mother to an 11mo old daughter, domiciled with her mother and older sister, employed as a substitute , with PMH scoliosis, PPH Borderline Personality Disorder, MDD, multiple psychiatric hospitalizations, prior suicidal behavior (most recent 2016 overdosed on meds), hx of cutting when younger, who is BIB EMS activated by mother due to emotional dysregulation & agitation in setting of fight with sister.    Per ED Note:  "The patient reports that this morning she woke up feeling fine (even though also describing worse fluctuations in mood and more frequent dissociative episodes since birth of daughter in the past year). An argument arose from something trivial, and pt states she became so frustrated with her sister she had urges to punch her, but she instead punched the wall. Her mother also restrained her. Pt then left home and began walking aimlessly while angry, and mother spoke to pt's therapist who then spoke to pt, and mother also activated 911. Pt reports getting a text message from the  which upset her further. She agreed to present to the hospital for evaluation and possible admission. Per patient, "it was already decided" that she would be admitted voluntarily to the hospital, and that is what she would prefer as she does not want to be near her mom and sister still. She reports still feeling "agitated" and feeling like she wants to punch her sister. She also worries that if she does not seek inpatient admission at this time, she may end up with a worse relationship with her mom and sister. Pt reports she is does not incite violence, but can be aggressive or dysregulated when triggered, as in during fights with sister which have been physical in the past. Patient denies any SI or urges to self-harm. Pt reports low mood most times for the past year, anhedonia, difficulty sleeping mostly 2/2 baby waking her up but also challenge falling asleep, has been eating less due to time constraints primarily.    Per Rosita South, pt's outpatient therapist:   "Britney moreau is  23yo cisgendered  female, sexuality unspecified, living with her mother Kate, sister Ainsley, and 9 month old daughter phoenix (born at [facility]) in the family apartment. 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 David, her mother kate, and sister ainsley (age 28) share childcare responsibilities, although britney is the primary caregiver.    Most recent hospitalization, at [facility] summer . [facility]  Britney ended the relationship w/ her baby's father before she knew she was pregnant.  Britney's father is . her older ½ brother  from substance use d/o.  Family history is significant for multiple suicide attempts (grandmother, mother, britney, maybe sister), including in the room Britney currently shares with phoenix.  family hx also significant for substance use, trauma, depression, BPD. David has not endorsed SI in recent months -- she is focused on improved self-control, self-regulation to 'break the cycle' for her daughter.  However, she is often easily overwhelmed with intense feelings, flashbacks, struggles to stay in emotional control.  When she feels this way there is a strong impulse 'to start something' and get back to her 'life on the street' where she was in control of her life on her terms. She has been able to resist these impulses but reports frequently feeling empty or dead inside.  This is observed one-on-one, but not when she's interacting w/ her daughter (meaning her daughter is not at risk b/c of britney's mental state)    Today family conflict was extra intense.  Britney received news that a 16 yo family friend is going to [facility] for attempted manslaughter. She reported feeling overwhelmed by this news, the idea of this 'kid' losing his life so to speak, if she could have supported him more to prvent this outcome. She was overwhelmed thinking of her own future and her daughter's future. She went into the bathroom intending to 'cut my wrist/arm' but hearing her daughter in the next room stopped her. An argument w/ mother and sister ensued -- loud, volatile. Britney said 'i'm done' referring to trying to be a 'good enough mom.'  Britney wrote a note saying she's relinquishing custody (this is a recurring family theme) and left the home to try to de-escalate the intesity of things, try to re-group. She denied SI at that time. She intended to seek out friends who she feels supported by. She felt flooded and enraged by her sister/mom's trying to track her down. They also called the [facility] who were trying to reach britney. Mom called me as well (not sure how she got my cell).  Britney answered my call, she was thoughtful, tearful, emotionally appropriate, insightful seeking refuge with friends. She stated that she new she left her daughter safely w/ her family -- 'trying to take her with me would've made it worse'."    On intake to [facility], pt reiterated the story described above.  She reported a long history of depression (including low energy, low motivation, poor sleep, and anhedonia) along with intrusive thoughts, flashbacks, dissociation, preoccupation with past losses/trauma, and emotional dysregulation, with family conflict being a central issue.  She was calm and cooperative, in no acute distress, with a logical/linear thought process, denying SI/HI, AVH.

## 2022-12-20 NOTE — BH INPATIENT PSYCHIATRY DISCHARGE NOTE - OTHER PAST PSYCHIATRIC HISTORY (INCLUDE DETAILS REGARDING ONSET, COURSE OF ILLNESS, INPATIENT/OUTPATIENT TREATMENT)
multiple hospitalizations (last Jan 2022 at [facility])  Follows with outpatient providers a/w [facility] - Dr. Cuellar (therapist), Dr. Guzman (psychiatrist)  overdose in 2016. Reports also running into to traffic and cutting in the past

## 2022-12-20 NOTE — BH PSYCHOLOGY - CLINICIAN PSYCHOTHERAPY NOTE - NSBHPSYCHOLADDL_PSY_A_CORE
Ms. Correa is a 23 yo  female who lives with her mother, sister, and 11 month old daughter phoenix, the family home.  Chronic traumatic stressors were recently exacerbated by poor sleep, conflict with family members, learning distressing news about extended family, inability to rely on prior coping due to wish to improve her circumstances for her daughter, and the stress of the holidays. The Madeline family are very supportive of each other, but each also have years and layers of complex trauma that contribute to emotional lability, conflicting priorities, and poor communication, that contributes to problematic patterns of relating and engaging.  Starting family therapy has been productive but has also unearthed many shared traumatic experiences that directly relate to caring for an infant.  David, her mother, and her sister are devoted to the care and wellbeing of baby Phoenix, and also at odds on how to prioritize Phoenix's wellbeing.      Ms. Correa reports significant improvement in mood, and overall well being, even as stressors persists. She states she 'feels more like a mom' and referenced meaningful conversations with other staff and patients, that have helped her to gain perspective, and consider the 'long term' nature of parenting, and helping her daughter to navigate all of life's ups and downs. She was appropriately emotional and collaborative throughout the process.  
Ms. Correa is a 23 yo  female who lives with her mother, sister, and 11 month old daughter phoenix, the family home.  Chronic traumatic stressors were recently exacerbated by poor sleep, conflict with family members, learning distressing news about extended family, inability to rely on prior coping due to wish to improve her circumstances for her daughter, and the stress of the holidays. The Madeline family are very supportive of each other, but each also have years and layers of complex trauma that contribute to emotional lability, conflicting priorities, and poor communication, that contributes to problematic patterns of relating and engaging.  Starting family therapy has been productive but has also unearthed many shared traumatic experiences that directly relate to caring for an infant.  David, her mother, and her sister are devoted to the care and wellbeing of baby Phoenix, and also at odds on how to prioritize Phoenix's wellbeing.

## 2022-12-20 NOTE — BH INPATIENT PSYCHIATRY DISCHARGE NOTE - NSBHDCMDCOMP_PSY_ALL_CORE
PREOPERATIVE DIAGNOSIS: Acute appendicitis.   POSTOPERATIVE DIAGNOSIS: Acute non-perforated appendicitis.   PROCEDURE: Appendectomy.   ANESTHESIA: General.   PREOPERATIVE MEDICATION: Cefotetan IV.   SURGEON: Petty Ivan MD   ASSISTANT: NONE    INDICATIONS: Earl Sheikh is a 19 year old male who presents with about a 9-10 hours history of RLQ abdominal pain and generalized.  He has had nausea and chills.  He has a physicial exam consistant with appendicitis and CT evidence of acute appendicitis.  He is brought to the operating room at this time for appendectomy.   PROCEDURE: The patient was placed supine, abdomen prepped and draped in usual sterile fashion. Right lower quadrant transverse incision is made and the abdomen is entered through the rectus sheath without dividing any rectus muscle fibers. Upon entering the abdomen, there was a small amount of clear fluid. Sabino wound protector was then placed in position and the appendix was  palpable  through the incision site. The appendix was mobilized up into the incision and is clearly inflamed but not perforated. The mesoappendix was taken down by double ligation and division.  The base is divided with a MARK stapler. The right lower quadrant was then copiously irrigated with Ancef solution and the returns are clear. Incision is then closed using running 0 Vicryl for anterior and posterior fascia and 4-0 subcuticular Monocryl for skin. 0.25% Marcaine was instilled for postoperative pain control. The patient transferred to recovery in good condition.   ESTIMATED BLOOD LOSS: 10 cc.   INTRAOPERATIVE FINDINGS: Acute non-perforated appendicitis.   PLAN:                                                                                 Okay to discharge when criteria are met.             This section is complete and the patient is ready for discharge

## 2022-12-20 NOTE — BH DISCHARGE NOTE NURSING/SOCIAL WORK/PSYCH REHAB - NSDCSUICIDEDEAD_PSY_ALL_CORE
[facility]  Zaria Chong  2011    Zaria Chong is a 5  y.o. 8  m.o. female presenting for follow-up of tetralogy of Fallot s/p transannular repair (2/10/12); free pulmonary insufficiency; tricuspid regurgitation with normal RVSP; abnormal but appropriate EKG; right facial hypoplasia and lid ptosis.  Zaria is here today with her grandparent.    HPI  Zaria was initially seen by cardiology upon admission to NICU where she was diagnosed with tetralogy of Fallot. She was born at 36 weeks, infant of diabetic mother, and was exposed in utero to methotrexate x 7 weeks, ibuprofen x  20+ weeks, Lortab x 36 weeks, Enbrel and Prednisone x 36 weeks (mother with rheumatoid arthritis). 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 She was transferred to [facility] at 16 days due to tet spells and she underwent complete transannular patch repair 2/10/12. She was evaluated by genetics at [facility] but facial hypoplasia was thought to be secondary to MTX exposure during first trimester. Microarray and  screens were normal.      Zaria was last seen here in 2016. At that time, she was doing well with no major issues or concerns from the family. Exam revealed minimal ptosis, grade 1/6 KAREL noted at ULSB, grade 1-2/6 diastolic murmur noted at ULSB, soft radiation of systolic murmur to anterior lung fields - right > left. She was asked to return in 12 months with interim echo.    Since the last visit, Zaria has done well overall with no major illnesses or hospitalizations.       Current Medications:   Previous Medications    ATROPINE 1% (ISOPTO ATROPINE) 1 % DROP    1 drop once daily.    PEDIATRIC MULTIVITAMIN CHEWABLE TABLET    Take 1 tablet by mouth once daily.     Allergies: Review of patient's allergies indicates:  No Known Allergies    Family History   Problem Relation Age of Onset    Arthritis Mother      rheumatoid    Hypertension Mother     No  Known Problems Father     No Known Problems Sister     Hyperlipidemia Maternal Grandmother     Hypertension Maternal Grandmother     Hypertension Maternal Grandfather     No Known Problems Paternal Grandmother     No Known Problems Paternal Grandfather      Past Medical History:   Diagnosis Date    Abnormal EKG     Ear anomaly affecting hearing, congenital     right ear canal atresia    Hearing loss     Hypoplasia, eye     right w/ lid ptosis    Incomplete right bundle branch block     Mid-facial hypoplasia     right facial hypoplasia    Pulmonary artery stenosis     RPA    Pulmonary insufficiency     Right heart enlargement     mild    Tetralogy of Fallot     s/p repair     Social History     Social History    Marital status: Single     Spouse name: N/A    Number of children: N/A    Years of education: N/A     Social History Main Topics    Smoking status: None    Smokeless tobacco: None    Alcohol use None    Drug use: Unknown    Sexual activity: Not Asked     Other Topics Concern    None     Social History Narrative    In ; appetite is good. Growth and development is appropriate.     Past Surgical History:   Procedure Laterality Date    DENTAL SURGERY  2013    EYE MUSCLE SURGERY  5/21/15    bilateral    EYE MUSCLE SURGERY  10/8/15    right eye    TETRALOGY OF FALLOT REPAIR  2/10/2012    Caspi-CHNO: Complete repair with transannular repair    TRIGGER FINGER RELEASE  4/2/2014    thumb       Review of Systems   Constitutional: Negative for activity change, appetite change and fatigue.   HENT: Positive for hearing loss.         Sees ENT   Eyes:        Has seen Dr. Snyder in the past   Respiratory: Negative for shortness of breath, wheezing and stridor.    Cardiovascular: Negative for chest pain and palpitations.   Gastrointestinal: Negative.    Genitourinary: Negative.    Musculoskeletal: Negative for gait problem.   Skin: Negative for color change and rash.   Neurological:  "Negative for dizziness, seizures, syncope, weakness and headaches.   Hematological: Does not bruise/bleed easily.       Objective:   Vitals:    08/24/17 1238   BP: (!) 95/55   BP Location: Right arm   Patient Position: Lying   BP Method: Pediatric (Automatic)   Pulse: 85   Resp: 20   SpO2: 98%   Weight: 14.7 kg (32 lb 8 oz)   Height: 3' 6.87" (1.089 m)       Physical Exam   Constitutional: Vital signs are normal. She appears well-developed and well-nourished. She is active and cooperative. No distress.   Facial dysmorphia, asymmetry.   HENT:   Head: Normocephalic.   Neck: Normal range of motion.   Cardiovascular: Normal rate, regular rhythm and S1 normal.  Exam reveals no S3 and no S4.  Pulses are strong.    Murmur (grade 1-2/6 to and fro murmur noted at ULSB; soft systolic murmur noted over back, right > left; muffled P2) heard.  Pulses:       Radial pulses are 2+ on the right side.        Femoral pulses are 2+ on the right side, and 2+ on the left side.  There are no clicks, rumbles, rubs, taps, or thrills noted. Slight left parasternal lift.   Pulmonary/Chest: Effort normal and breath sounds normal. There is normal air entry. No respiratory distress. She exhibits deformity (mild surgical pectus excavatum).   Well healed sternotomy   Abdominal: Soft. Bowel sounds are normal. She exhibits no distension. There is no hepatosplenomegaly.   There are no abdominal bruits noted. Old, healed g-button scar.   Musculoskeletal: Normal range of motion.   Neurological: She is alert.   Skin: Skin is warm. No rash noted. No cyanosis.   There is no clubbing noted.   Psychiatric: She has a normal mood and affect. Her speech is normal and behavior is normal.   Nursing note and vitals reviewed.      Tests:   Today's EKG interpretation by Dr. Winter reveals: normal sinus rhythm and sinus arrhythmia, incomplete right bundle branch block, abnormal infero-lateral T waves. EKG is abnormal.   (Final report in electronic medical " record)    Echocardiogram:   Pertinent Echocardiographic findings from the Echo dated 8/2/17 are:   Mild RV enlargement  Qualitatively enlarged Aorta (1.9cm, z +1.2)  Mild + enlargement of the sinotubular junction; Z +4.2  Mild enlargement of the ascending aorta   Full RA with IAS deviated to left  Mild RPA stenosis  Free PI  Mild to moderate TR  Paradoxical motion noted.  RVSP ~30 mmHg(Full report in electronic medical record)      9/29/15 7/29/16 [facility] echo 8/2/17  [facility] echo   RVID 1.8cm 2.3 cm 2.0 cm   Ao root  1.9 (z +1.9) 1.6cm (z= -0.37) 1.9cm (z + 1.2)   ST junction   2.0 (z+4.2)   PI free free free   TR moderate Mild to moderate Mild to moderate   RVSP 32mmHg 37mmHg 30mmHg   RPA  149.3cm/s 120cm/s   LPA  133cm/s 127.4cm/s 122.7cm/sec       Assessment:  1. Tetralogy of Fallot    2. S/P TOF (tetralogy of Fallot) repair    3. Pulmonary valve insufficiency, secondary    4. Incomplete bundle branch block    5. Pulmonary artery stenosis (RPA)    6. Right heart enlargement, mild    7. Abnormal EKG    8. Mid-facial hypoplasia        Discussion:   Dr. Winter reviewed history and physical exam. He then performed the physical exam. He discussed the findings with the patient's caregiver(s), and answered all questions.    Zaria has a history of TOF s/p transannular patch repair (2/10/12) with free pulmonary insufficiency, mild right ventricular enlargement, ICRBBB on EKG, and facial hypoplasia. We have discussed future prognosis in patients with TOF and post-op free pulmonary insufficiency. Her right ventricle size will be monitored by echo and when it is significantly enlarged and/or she is symptomatic, we usually will obtain cardiac MRI for quantitative RV volumes. She will statistically require pulmonary valve replacement in the future. She will also be monitored for dysrhythmias for her whole life as patients with TOF are at increased risk for development of dysrhythmias.    I have reviewed our general guidelines  related to cardiac issues with the family.  I instructed them in the event of an emergency to call 911 or go to the nearest emergency room.  They know to contact the PCP if problems arise or if they are in doubt.      Plan:    1. Activity:She can participate in normal age-appropriate activities. She should be allowed to set her own pace and rest if fatigued.    2. Selective endocarditis prophylaxis is recommended in this circumstance.     3. Medications:   Current Outpatient Prescriptions   Medication Sig    atropine 1% (ISOPTO ATROPINE) 1 % Drop 1 drop once daily.    pediatric multivitamin chewable tablet Take 1 tablet by mouth once daily.     No current facility-administered medications for this visit.      4. Orders placed this encounter  Orders Placed This Encounter   Procedures    EKG 12-lead pediatric    Echocardiogram pediatric     5. Follow up with the primary care provider for the following issues: Nothing identified.      Follow-Up:   Return for clinic f/u, EKG, echo in 12 months.      Sincerely,    Adama Winter MD    Note Contributing Authors:  MD Natalie Bustos APRN, PNP-C     No

## 2022-12-20 NOTE — BH PSYCHOLOGY - CLINICIAN PSYCHOTHERAPY NOTE - NSBHPSYCHOLGOALS_PSY_A_CORE
Improve family functioning/Improve social/vocational/coping skills
Assessment/Improve social/vocational/coping skills

## 2022-12-20 NOTE — BH PSYCHOLOGY - CLINICIAN PSYCHOTHERAPY NOTE - NSBHPSYCHOLINT_PSY_A_CORE
Dynamic issues addressed/Supported coping skills/Supportive therapy
Dynamic issues addressed/Supported coping skills/Supportive therapy

## 2022-12-20 NOTE — BH DISCHARGE NOTE NURSING/SOCIAL WORK/PSYCH REHAB - NSDCADDINFO1FT_PSY_ALL_CORE
Psychotherapy appointment scheduled with established provider Dr. Rosita South. This is a VIRTUAL appointment.

## 2022-12-20 NOTE — BH INPATIENT PSYCHIATRY DISCHARGE NOTE - ATTENDING DISCHARGE PHYSICAL EXAMINATION:
MSE- Well groomed and related, good EC. -PMR/A Speech: wnl Mood: "I'm good." Affect: full-range, appropriate TP: linear TC: -SI/HI/AH/VH/PI. I&J: poor

## 2022-12-20 NOTE — BH INPATIENT PSYCHIATRY DISCHARGE NOTE - REASON FOR ADMISSION
The patient is a 21 yo woman, mother to an 11mo old daughter, who lives with her mother and older sister, with past medical history of scoliosis, and past psychiatric history of depression and Borderline Personality Disorder.  She has a history of multiple psychiatric hospitalizations, who was brought in by EMS activated by her mother due to emotional dysregulation & agitation in setting of fight with sister.  While admitted to [facility], Lexapro was increased to 15 mg daily, and the patient was discharged home with close outpatient follow-up.

## 2022-12-20 NOTE — BH PSYCHOLOGY - CLINICIAN PSYCHOTHERAPY NOTE - NSBHPSYCHOLASSESSPROV_PSY_A_CORE
Marah Faria is scribing on behalf of Debra Ranke, RN who administered flu shot on 11/02/2021 to patient at the employer, [employer].  Vaccine Information Statement(s) for Influenza given and reviewed, questions answered,  Consent was given and signed by patient for injection(s) administered and is filed in chart.  Patient tolerated without incident. See immunization grid for documentation.     Licensed Psychologist

## 2022-12-20 NOTE — BH PSYCHOLOGY - CLINICIAN PSYCHOTHERAPY NOTE - TOKEN PULL-DIAGNOSIS
Primary Diagnosis:  Borderline personality disorder [F60.3]        Problem Dx:   Post traumatic stress disorder (PTSD) [F43.10]      Borderline personality disorder [F60.3]      
Primary Diagnosis:  Borderline personality disorder [F60.3]        Problem Dx:   Borderline personality disorder [F60.3]

## 2022-12-20 NOTE — BH SAFETY PLAN - WARNING SIGN 1
Pt completed a written safety plan and received a copy of it to take with her. An additional copy has been filed in the Pt's chart on the unit.

## 2022-12-20 NOTE — BH DISCHARGE NOTE NURSING/SOCIAL WORK/PSYCH REHAB - NSDCADDINFO3FT_PSY_ALL_CORE
Psychiatry appointment scheduled with established provider Dr. Lyla Guzman. This is a VIRTUAL appointment.

## 2022-12-20 NOTE — BH DISCHARGE NOTE NURSING/SOCIAL WORK/PSYCH REHAB - NSDCPRGOAL_PSY_ALL_CORE
Over the course of tx., pt. has been highly visible on the unit and participated in groups of most therapeutic modalities; pt. endorsed feeling positive about her decision to get help and how it was going to help with her 9 month old baby; pt. had a great deal of insight on ongoing struggles with mental illness and symptom management; pt. was future-orientated upon discharge and could identify triggers, warning signs, and coping strategies

## 2022-12-20 NOTE — BH DISCHARGE NOTE NURSING/SOCIAL WORK/PSYCH REHAB - PATIENT PORTAL LINK FT
You can access the FollowMyHealth Patient Portal offered by [facility] by registering at the following website: [email or web address]. By joining [facility]’s FollowMyHealth portal, you will also be able to view your health information using other applications (apps) compatible with our system.
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
## 2022-12-20 NOTE — BH PSYCHOLOGY - CLINICIAN PSYCHOTHERAPY NOTE - NSBHPSYCHOLDISCUSS_PSY_A_CORE FT
Interdisciplinary communication w/ Dr. Matthews, Chepe Parks, [facility] and Dr. Thomas, around d/c planning. 
Interdisciplinary communication w/ Dr. Matthews, Chepe Parks, LCSW and Dr. Thomas

## 2022-12-21 ENCOUNTER — APPOINTMENT (OUTPATIENT)
Dept: PSYCHIATRY | Facility: CLINIC | Age: 22
End: 2022-12-21

## 2022-12-21 PROCEDURE — 90832 PSYTX W PT 30 MINUTES: CPT | Mod: 95

## 2022-12-21 NOTE — RISK ASSESSMENT
[No, patient denies ideation or behavior] : No, patient denies ideation or behavior [FreeTextEntry8] : Ms. Correa reports ongoing efforts to cope with stressors, intrusive thoughts/ flashbacks, and criticism so that she is not overwhelmed by her feelings. She has focussed on prioritizing her daughter's needs over everyone else's, in spite of feedback from others that feels critical. Even in the context of a hospitalization in the context of emotional dysregulation intensified by numerous psychosocial stressors, she denies SI, denies NSSIB. [FreeTextEntry7] : Pt did not report any self harm impulses/intentions.  She reports intense aggressive urges at times when overwhelmed, but engages in behaviors and strategies that re-direct her feelings, for example taking a walk, calling people.   [FreeTextEntry9] : Ms. Correa is faced with persistent stressors and interpersonal conflict, but is reporting intentional efforts to maintain self-control and prioritize personal values that support a solid foundation for the future

## 2022-12-21 NOTE — PLAN
[Psychodynamic Therapy] : Psychodynamic Therapy  [Supportive Therapy] : Supportive Therapy [FreeTextEntry2] : Emotional control. coping with stress coping with past trauma, strategies for managing family conflict, engaging in positive parenting practices.  [de-identified] : Ms. Correa was in her home, with her daughter nearby at the time of the session Discussed reuniting with her daughter, in ways that support them both.  She reflected on many positive consequences of seeking inpatient care, including communicating to family members that she is committed to her mental health and wellbeing. She engaged in strategizing 'planning ahead' for experiences that tend to increase conflict and decreased self-control from a DBT perspective. Discussed planning for the holiday weekend, and week ahead in ways that 're write' the family's holiday narrative.  She was thoughtful, collaborative, insightful.   [FreeTextEntry1] : Weekly individual therapy, increasing to twice weekly in the new year. Psychiatry w/ Dr. Guzman.  Family therapy w/ Dr. Thomas. Reports tolerating new lexapro regimen.  Referred to [facility]; referral confirmed by Hilda Elizabeth.  \par Pt knows I am out next week, and discussed how to seek additional support if needed, and under what conditions she can reach out to me if needed.

## 2022-12-21 NOTE — REASON FOR VISIT
[Patient] : Patient [Post-Hospitalization Visit] : This is a post-hospitalization visit [FreeTextEntry1] : Fatigue, low mood, coping with intense family dynamics, coping with past trauma and related sxs, improving self-regulation

## 2022-12-21 NOTE — PHYSICAL EXAM
[Well groomed] : well groomed [Cooperative] : cooperative [Euthymic] : euthymic [Anxious] : anxious [Full] : full [Clear] : clear [Linear/Goal Directed] : linear/goal directed [None] : none [None Reported] : none reported [Average] : average [WNL] : within normal limits [FreeTextEntry5] : collaborative, thoughtful [FreeTextEntry8] : Reports significant improvement in mood in the last 2 days

## 2022-12-21 NOTE — END OF VISIT
[Teletherapy Service Provided] : The services provided in this session were delivered via tele-therapy [Licensed Clinician] : Licensed Clinician [Duration of Psychotherapy Visit (minutes spent in synchronous communication): ____] : Duration of Psychotherapy Visit (minutes spent in synchronous communication): [unfilled] [Individual Psychotherapy for 16-37 minutes] : Individual Psychotherapy for 16-37 minutes [FreeTextEntry3] : Home [FreeTextEntry5] : [facility]

## 2022-12-22 ENCOUNTER — APPOINTMENT (OUTPATIENT)
Dept: PSYCHIATRY | Facility: CLINIC | Age: 22
End: 2022-12-22

## 2022-12-22 PROCEDURE — 99215 OFFICE O/P EST HI 40 MIN: CPT | Mod: 95
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
## 2022-12-22 NOTE — PLAN
[Yes. details: ___] : Yes, [unfilled] [Medication education provided] : Medication education provided. [Rationale for medication choices, possible risks/precautions, benefits, alternative treatment choices, and consequences of non-treatment discussed] : Rationale for medication choices, possible risks/precautions, benefits, alternative treatment choices, and consequences of non-treatment discussed with patient/family/caregiver  [FreeTextEntry4] : addressing low mood and anxiety and impulsivity [FreeTextEntry5] : see above
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
## 2022-12-26 NOTE — END OF VISIT
[Family Therapy With Client Present] : Family Therapy With Client Present  [FreeTextEntry3] : Home [FreeTextEntry5] : [facility]

## 2022-12-26 NOTE — PLAN
[Family Therapy (all types)] : Family Therapy (all types)  [Return in ____ week(s)] : Return in [unfilled] week(s) [FreeTextEntry2] : Communication patterns between family members \par Coexistence in the same house with family members \par  [de-identified] : Pt, pt's sister and pt's mother continued to discuss ongoing difficulties in communication between the three of them, specifically related to pt's psychiatric hospitalization this past weekend. Interventions centered around supporting articulation of difficulties as related to interpersonal dynamics and exacerbated by both acute and ongoing stressors. Family responded in a negative way to interventions, specifically stating hopelessness and purposeless of family therapy. Agreement to continue this discussion next session.

## 2022-12-27 ENCOUNTER — APPOINTMENT (OUTPATIENT)
Dept: PSYCHIATRY | Facility: CLINIC | Age: 22
End: 2022-12-27

## 2022-12-27 DIAGNOSIS — F12.90 CANNABIS USE, UNSPECIFIED, UNCOMPLICATED: ICD-10-CM

## 2022-12-27 DIAGNOSIS — F60.3 BORDERLINE PERSONALITY DISORDER: ICD-10-CM

## 2022-12-27 DIAGNOSIS — R45.851 SUICIDAL IDEATIONS: ICD-10-CM

## 2022-12-27 DIAGNOSIS — F31.9 BIPOLAR DISORDER, UNSPECIFIED: ICD-10-CM

## 2022-12-27 DIAGNOSIS — Z91.51 PERSONAL HISTORY OF SUICIDAL BEHAVIOR: ICD-10-CM

## 2022-12-29 ENCOUNTER — APPOINTMENT (OUTPATIENT)
Dept: PSYCHIATRY | Facility: CLINIC | Age: 22
End: 2022-12-29

## 2023-01-03 ENCOUNTER — NON-APPOINTMENT (OUTPATIENT)
Age: 23
End: 2023-01-03

## 2023-01-03 ENCOUNTER — APPOINTMENT (OUTPATIENT)
Dept: PSYCHIATRY | Facility: CLINIC | Age: 23
End: 2023-01-03

## 2023-01-03 NOTE — BH SOCIAL WORK CONFIRMATION FOLLOW UP NOTE - NSLINKEDTOLOC_PSY_ALL_CORE
[facility]  21-Dec-2022 10:00  [address] [address]

## 2023-01-03 NOTE — BH SOCIAL WORK CONFIRMATION FOLLOW UP NOTE - NSCOMMENTS_PSY_ALL_CORE
Caring call/Linkage call made. SW spoke with pt who confirmed she made appointment. Pt doing well, no concerns at this time. Denies current SI/HI/AVH/PI.

## 2023-01-05 ENCOUNTER — NON-APPOINTMENT (OUTPATIENT)
Age: 23
End: 2023-01-05

## 2023-01-05 ENCOUNTER — APPOINTMENT (OUTPATIENT)
Dept: PSYCHIATRY | Facility: CLINIC | Age: 23
End: 2023-01-05
Payer: MEDICAID

## 2023-01-05 PROCEDURE — 90832 PSYTX W PT 30 MINUTES: CPT | Mod: 95
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
## 2023-01-05 NOTE — PHYSICAL EXAM
[1] : 1 [3] : 3 [2] : 2 [Well groomed] : well groomed [Cooperative] : cooperative [Depressed] : depressed [Anxious] : anxious [Full] : full [Clear] : clear [Linear/Goal Directed] : linear/goal directed [None] : none [Average] : average [WNL] : within normal limits [FreeTextEntry5] : collaborative, thoughtful [FreeTextEntry1] : 22

## 2023-01-05 NOTE — END OF VISIT
[Duration of Psychotherapy Visit (minutes spent in synchronous communication): ____] : Duration of Psychotherapy Visit (minutes spent in synchronous communication): [unfilled] [Individual Psychotherapy for 16-37 minutes] : Individual Psychotherapy for 16-37 minutes [Teletherapy Service Provided] : The services provided in this session were delivered via tele-therapy [FreeTextEntry3] : Home [FreeTextEntry5] : [facility] [Licensed Clinician] : Licensed Clinician

## 2023-01-05 NOTE — PLAN
[FreeTextEntry2] : Emotional control. coping with stress coping with past trauma, strategies for managing family conflict, engaging in positive parenting practices.  [Eye Movement Desensitization and Reprocessing Therapy (EMDR)] : Eye Movement Desensitization and Reprocessing Therapy (EMDR) [Psychodynamic Therapy] : Psychodynamic Therapy  [Supportive Therapy] : Supportive Therapy [de-identified] : Ms. Correa was observed alone and then with her daughter. Initially reporting feeling "annoyed" and "not motivated" when her daughter joined the session after her nap, mS. Hernandez reported improved mood and motivation.  Discussed coping with uncertaintly and complex and competing priorities. Completed paperwork to access supports and preventative services, including [facility]. Completed tx plan. She asked for two 1/2 hour sessions weekly -- "that feels more manageable, and more routine. 45 minutes is very intense and also not frequent.' Discussed tx adherence. Discussed planning for her daughter's birthday party.  [FreeTextEntry1] : Twice-Weekly individual therapy. Psychiatry w/ Dr. Guzman.  Reports tolerating new prozac regimen so far.  Engaged in family therapy w/ sister and mother. Referred for preventative services and [facility] -- paperwork completed.

## 2023-01-05 NOTE — DISCUSSION/SUMMARY
[Plan Revision] : Plan Revision [Able to exercise self-direction] : able to exercise self-direction [Able to set and pursue goals] : able to set and pursue goals [Adherent to treatment recommendations] : adherent to treatment recommendations [Articulate] : articulate [Attempting to realize their potential] : Attempting to realize their potential [Awareness of substance use issues] : awareness of substance use issues [Cognitively intact] : cognitively intact [Insightful] : insightful [Creative] : creative [Intelligent] : intelligent [Motivated to participate in treatment] : motivated to participate in treatment [Motivated and ready for change] : motivated and ready for change [Motivated to maintain or improve physical health] : motivated to maintain or improve physical health [Has vocational interests or hobbies] : has vocational interests or hobbies [Part of a supportive family] : part of a supportive family [Steady employment] : steady employment [Housing stability] : housing stability [High level of education] : high level of education [English fluency] : English fluency [Connected to healthcare] : connected to healthcare [Access to safe outdoor spaces] : access to safe outdoor spaces [Social supports] : social supports [Mental Health] : Mental Health [Revised - Rationale] : Revised - Rationale: [every ___ weeks] : every [unfilled] weeks [Substance Abuse] : Substance Abuse [every ___ months] : every [unfilled] months [___ times a week] : [unfilled] times a week [Improvement in symptoms as evidenced by:] : Improvement in symptoms as evidenced by: [None - Reason others did not participate:] : None - Reason others did not participate:  [Therapist] : Therapist [FreeTextEntry2] : [birth year] [FreeTextEntry3] : February 2022 [FreeTextEntry8] : unpredictable home environment [de-identified] : David referred for Preventative Services and Case Management, in addition to the Group Attachment Based Intervention ([facility]) [Continued - Progress made] : Continued - Progress made: [Yes] : Yes [de-identified] : 6 monhts [de-identified] : 6 months [de-identified] : Improved energy [de-identified] : Feel more motivated on a daily basis, "have more purpose"  [FreeTextEntry1] : Marijuana use [FreeTextEntry4] : "I use it to calm me down, so if I focus on the other things, I want it to feel like an extra thing, I don't want to use it more than 2-3x/week."  [de-identified] : Currently MJ is a strategy for regulating mood and conflict-ridden relationship dynamics [de-identified] : Reduce reliance on MJ for mood instability and interpersonal conflict [de-identified] : would like to reduce to 2-3x /week, and only recreationally -- has reduced MJ use over time [FreeTextEntry5] : Individual therapy, adequate medication regime [de-identified] : Lyla Guzman MD [de-identified] : Rosita South, PhD [de-identified] : Improved depression, improved relationships, improved sense of self/future
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
## 2023-01-09 ENCOUNTER — APPOINTMENT (OUTPATIENT)
Dept: PSYCHIATRY | Facility: CLINIC | Age: 23
End: 2023-01-09

## 2023-01-10 ENCOUNTER — APPOINTMENT (OUTPATIENT)
Dept: PSYCHIATRY | Facility: CLINIC | Age: 23
End: 2023-01-10
Payer: MEDICAID

## 2023-01-10 PROCEDURE — 90832 PSYTX W PT 30 MINUTES: CPT | Mod: 95

## 2023-01-10 PROCEDURE — 99214 OFFICE O/P EST MOD 30 MIN: CPT | Mod: 95

## 2023-01-10 NOTE — RISK ASSESSMENT
[No, patient denies ideation or behavior] : No, patient denies ideation or behavior [FreeTextEntry9] : low acute risk of harm to self/others. [Low acute suicide risk] : Low acute suicide risk [Yes] : Yes [FreeTextEntry3] : chronic risk persists.

## 2023-01-10 NOTE — DISCUSSION/SUMMARY
[1. Helpful Person/Contact Number: _____] : 1. Helpful Person/Contact Number: [unfilled] [a. Clinician Name/Contact Information: _____] : Clinician Name/Contact Information: [unfilled] [b. Clinician Name/Contact Information: _____] : Clinician Name/Contact Information: [unfilled] [g. Suicide & Crisis Lifeline - send a text or make a call to 988] : Suicide & Crisis Lifeline - send a text or make a call to 988 [FreeTextEntry1] : 01/10/2023 [FreeTextEntry2] : Arguments with family\par "People telling me different ways to deal with Phoenix"\par "unexpected tragic news -- a drastic event"  [FreeTextEntry3] : Hitting things\par "rapid speech"\par "the thought 'this can't be real'"\par being in a certain location -- areas where it's heavily populated, going towards places that 'cause me to have flashbacks', being by hospitals can also give me flashbacks about my dad [FreeTextEntry4] : playing games like [employer]\par watching songs/music videos\par 'saying 'good morning gorgeous' to phoenix and seeing her laugh\par taking a shower\par  [FreeTextEntry5] : group chat w/ friend Janeth\par friend Bam \par kailey across the street [de-identified] : Kylie Arechiga\par [facility]\par Maternal Mental healthline [phone]\par PSI Helpline: [phone]\par PSI Text line [phone] [de-identified] : No SI plan, no NSSIB recently -- not in mind since prior to pregnancy. everyting is put away because there's a baby in the house, and things are 'babyproof'  [de-identified] : Family conflict -- not being able to control other people's moods and behaviors [de-identified] : Trying new positive statements [de-identified] : Being Phoenix's mom -- her life is better with me in it\par worth seeing if the future can be better than the present'
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
## 2023-01-10 NOTE — DISCUSSION/SUMMARY
[1. Helpful Person/Contact Number: _____] : 1. Helpful Person/Contact Number: [unfilled] [a. Clinician Name/Contact Information: _____] : Clinician Name/Contact Information: [unfilled] [b. Clinician Name/Contact Information: _____] : Clinician Name/Contact Information: [unfilled] [g. Suicide & Crisis Lifeline - send a text or make a call to 988] : Suicide & Crisis Lifeline - send a text or make a call to 988 [FreeTextEntry1] : 01/10/2023 [FreeTextEntry2] : Arguments with family\par "People telling me different ways to deal with [place]"\par "unexpected tragic news -- a drastic event"  [FreeTextEntry3] : Hitting things\par "rapid speech"\par "the thought 'this can't be real'"\par being in a certain location -- areas where it's heavily populated, going towards places that 'cause me to have flashbacks', being by hospitals can also give me flashbacks about my dad [FreeTextEntry4] : playing games like [employer]\par watching songs/music videos\par 'saying 'good morning gorgeous' to phoenix and seeing her laugh\par taking a shower\par  [FreeTextEntry5] : group chat w/ friend Janeth\par friend Bam \par kailey across the street [de-identified] : Kylie Arechiga\par [facility]\par Maternal Mental healthline [phone]\par PSI Helpline: [phone]\par PSI Text line [phone] [de-identified] : No SI plan, no NSSIB recently -- not in mind since prior to pregnancy. everyting is put away because there's a baby in the house, and things are 'babyproof'  [de-identified] : Family conflict -- not being able to control other people's moods and behaviors [de-identified] : Trying new positive statements [de-identified] : Being Phoenix's mom -- her life is better with me in it\par worth seeing if the future can be better than the present'
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
## 2023-01-12 ENCOUNTER — APPOINTMENT (OUTPATIENT)
Dept: PSYCHIATRY | Facility: CLINIC | Age: 23
End: 2023-01-12

## 2023-01-17 ENCOUNTER — APPOINTMENT (OUTPATIENT)
Dept: PSYCHIATRY | Facility: CLINIC | Age: 23
End: 2023-01-17

## 2023-01-18 ENCOUNTER — APPOINTMENT (OUTPATIENT)
Dept: PSYCHIATRY | Facility: CLINIC | Age: 23
End: 2023-01-18
Payer: MEDICAID

## 2023-01-18 PROCEDURE — 99214 OFFICE O/P EST MOD 30 MIN: CPT | Mod: 95

## 2023-01-18 NOTE — PLAN
[Eye Movement Desensitization and Reprocessing Therapy (EMDR)] : Eye Movement Desensitization and Reprocessing Therapy (EMDR) [Psychodynamic Therapy] : Psychodynamic Therapy  [Supportive Therapy] : Supportive Therapy [FreeTextEntry2] : Emotional control. coping with stress coping with past trauma, strategies for managing family conflict, engaging in positive parenting practices.  [de-identified] : Ms. Correa was easily engaged today. She noted various sources of stress and distress related to her upcoming trip. Examined possible coping mechanisms, specfic to different parts of the traveling experience. Identified possible sources of internal support. David was focussed on supporting her daughter to the best of her ability.   [FreeTextEntry1] : Twice-Weekly individual therapy. Psychiatry w/ Dr. Guzman.  Reports tolerating new prozac regimen so far.  Engaged in family therapy w/ sister and mother. Referred for preventative services and [facility] -- paperwork completed. Health Home service initiated.

## 2023-01-18 NOTE — RISK ASSESSMENT
[Yes, patient reports ideation or behavior] : Yes, patient reports ideation or behavior [Yes, patient reports homicidal ideation] : Yes, patient reports homicidal ideation [No] : No [Yes, more than three months ago] : Yes, more than three months ago [PTSD] : PTSD [Cluster B Personality disorder/traits] : cluster B personality disorder/traits [Depressed mood/Anhedonia] : depressed mood/anhedonia [Hopelessness or despair] : hopelessness or despair [History of abuse/trauma] : history of abuse/trauma [History of Impulsivity] : history of impulsivity [Family Hx of suicide/suicidal behavior] : family history of suicide/suicidal behavior [Family Hx of psychiatric diagnoses requiring hospitalization] : family history of psychiatric diagnoses requiring hospitalization [Recent inpatient discharge] : recent inpatient discharge [Triggering events leading to humiliation, shame, and/or despair] : triggering events leading to humiliation, shame, and/or despair (e.g. loss of relationship, financial or health status) (real or anticipated) [Current or pending social isolation] : current or pending social isolation [Inadequate social supports] : inadequate social supports [Identifies reasons for living] : identifies reasons for living [Supportive social network of family or friends] : supportive social network of family or friends [Ability to cope with stress] : ability to cope with stress [Positive therapeutic relationships] : positive therapeutic relationships [Responsibility to children, family, or others] : responsibility to children, family, or others [Frustration tolerance] : frustration tolerance [Hx of being victimized/traumatized] : history of being victimized/traumatized [History of violence prior to age 18] : history of violence prior to age 18 [Antisocial behavior/cognition (past or present)] : antisocial behavior/cognition (past or present) [Community stressors that increase the risk of destabilization] : community stressors that increase the risk of destabilization [Affective dysregulation] : affective dysregulation [Impulsivity] : impulsivity [Irritability] : irritability [Low acute suicide risk] : Low acute suicide risk [Yes] : Safety Plan completed/updated (for individuals at risk): Yes [FreeTextEntry8] : Ms. Correa Did not endorse SI today, or any NSSIB intentions/actions. She described increased frustration and efforts to cope while maintaining self-control.  [FreeTextEntry7] : Ms. Correa statescontinues to describe anger and frustration as focal issues in her day-to-day life and interactions.  [FreeTextEntry9] : Ms. Correa was in good self-control at the time of the session, did not report moments of active dysregulation or overwhelming distress She continues to note that the 'impulse to lash out' often occurs in response to feeling stuck/trapped/misjudged or falsely characterized by other people in her life, and in these condisions 'if the situation presents itself it's like an opportunity to discharge some negative feelings.'  for example, after  a fight with family, if she feels slighted by a stranger on the street, she feels compelled to act in a retaliatory way. She increasingly wants to avoid this displacement of feelings, and is also increasingly troubled by these impulses, noting she has been mostly able to keep them in check.  She is a collaborative participant in examining these impulses and considering alternative actions.  [FreeTextEntry2] : Recent inpatient d/c and chronic family conflict and need for new coping skills present unique risks at present [FreeTextEntry3] : Initiated wraparound services, including health home, and [facility] preventatve case management

## 2023-01-18 NOTE — END OF VISIT
[Duration of Psychotherapy Visit (minutes spent in synchronous communication): ____] : Duration of Psychotherapy Visit (minutes spent in synchronous communication): [unfilled] [Individual Psychotherapy for 16-37 minutes] : Individual Psychotherapy for 16-37 minutes [Teletherapy Service Provided] : The services provided in this session were delivered via tele-therapy [Licensed Clinician] : Licensed Clinician [FreeTextEntry3] : Home [FreeTextEntry5] : [facility]
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
## 2023-01-18 NOTE — PHYSICAL EXAM
[Well groomed] : well groomed [Cooperative] : cooperative [Depressed] : depressed [Anxious] : anxious [Full] : full [Clear] : clear [Linear/Goal Directed] : linear/goal directed [None] : none [Average] : average [WNL] : within normal limits [1] : 1 [3] : 3 [2] : 2 [FreeTextEntry5] : collaborative, thoughtful [FreeTextEntry1] : 22

## 2023-01-18 NOTE — REASON FOR VISIT
[Patient preference] : as per patient preference [Starting, patient seen in-person within last 6 months] : Telehealth services are being started as patient has seen in-person within last 6 months. [Telehealth (audio & video) - Individual/Group] : This visit was provided via telehealth using real-time 2-way audio visual technology. [Medical Office: ([facility])___] : The provider was located at the medical office in [unfilled]. [Home] : The patient, [unfilled], was located at home, [unfilled], at the time of the visit. [Verbal consent obtained from patient/other participant(s)] : Verbal consent for telehealth/telephonic services obtained from patient/other participant(s) [Patient] : Patient [FreeTextEntry4] : 1pm [FreeTextEntry5] : 1:30 [FreeTextEntry3] : Pt last seen in-person during her hospitalization on [facility] [FreeTextEntry1] : Low energy, low mood, coping with intense family dynamics, coping with past trauma and related sxs, improving coping/self-regulation
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
## 2023-01-19 ENCOUNTER — APPOINTMENT (OUTPATIENT)
Dept: PSYCHIATRY | Facility: CLINIC | Age: 23
End: 2023-01-19

## 2023-01-19 NOTE — REASON FOR VISIT
[Patient preference] : as per patient preference [Starting, patient seen in-person within last 6 months] : Telehealth services are being started as patient has seen in-person within last 6 months. [Telehealth (audio & video) - Individual/Group] : This visit was provided via telehealth using real-time 2-way audio visual technology. [Medical Office: ([facility])___] : The provider was located at the medical office in [unfilled]. [Home] : The patient, [unfilled], was located at home, [unfilled], at the time of the visit. [Verbal consent obtained from patient/other participant(s)] : Verbal consent for telehealth/telephonic services obtained from patient/other participant(s) [Patient] : Patient [FreeTextEntry4] : 1pm [FreeTextEntry5] : 1:30 [FreeTextEntry3] : Pt last seen in-person during her hospitalization on [facility] [FreeTextEntry1] : Low energy, low mood, coping with intense family dynamics, coping with past trauma and related sxs, improving coping/self-regulation

## 2023-01-19 NOTE — END OF VISIT
[Duration of Psychotherapy Visit (minutes spent in synchronous communication): ____] : Duration of Psychotherapy Visit (minutes spent in synchronous communication): [unfilled] [Individual Psychotherapy for 16-37 minutes] : Individual Psychotherapy for 16-37 minutes [Teletherapy Service Provided] : The services provided in this session were delivered via tele-therapy [Licensed Clinician] : Licensed Clinician [FreeTextEntry3] : Home [FreeTextEntry5] : [facility]

## 2023-01-19 NOTE — RISK ASSESSMENT
[Yes, patient reports ideation or behavior] : Yes, patient reports ideation or behavior [Yes, patient reports homicidal ideation] : Yes, patient reports homicidal ideation [No] : No [Yes, more than three months ago] : Yes, more than three months ago [PTSD] : PTSD [Cluster B Personality disorder/traits] : cluster B personality disorder/traits [Depressed mood/Anhedonia] : depressed mood/anhedonia [Hopelessness or despair] : hopelessness or despair [History of abuse/trauma] : history of abuse/trauma [History of Impulsivity] : history of impulsivity [Family Hx of suicide/suicidal behavior] : family history of suicide/suicidal behavior [Family Hx of psychiatric diagnoses requiring hospitalization] : family history of psychiatric diagnoses requiring hospitalization [Recent inpatient discharge] : recent inpatient discharge [Triggering events leading to humiliation, shame, and/or despair] : triggering events leading to humiliation, shame, and/or despair (e.g. loss of relationship, financial or health status) (real or anticipated) [Current or pending social isolation] : current or pending social isolation [Inadequate social supports] : inadequate social supports [Identifies reasons for living] : identifies reasons for living [Supportive social network of family or friends] : supportive social network of family or friends [Ability to cope with stress] : ability to cope with stress [Positive therapeutic relationships] : positive therapeutic relationships [Responsibility to children, family, or others] : responsibility to children, family, or others [Frustration tolerance] : frustration tolerance [Hx of being victimized/traumatized] : history of being victimized/traumatized [History of violence prior to age 18] : history of violence prior to age 18 [Antisocial behavior/cognition (past or present)] : antisocial behavior/cognition (past or present) [Community stressors that increase the risk of destabilization] : community stressors that increase the risk of destabilization [Affective dysregulation] : affective dysregulation [Impulsivity] : impulsivity [Irritability] : irritability [Low acute suicide risk] : Low acute suicide risk [Yes] : Safety Plan completed/updated (for individuals at risk): Yes [FreeTextEntry8] : Ms. Correa elaborated on SI today, noting that it tends to emerge when she is feeling trapped/overwhelmed by circumstances or other people. She has no plan/intent, and has not had a plan/intent since finding out she was pregnant, almost two years ago.  She described SI as representing a 'way out' of a moment when it feels 'impossilbe' that things will change -- also stating that these experiences are not lasting, and SI is often fleeting these days. She completed a safety plan today and described the process as clarifying  [FreeTextEntry7] : Ms. Correa states that on the day of her recent hospitalization, she directed anger towards a door, reportedly to attain some control over intense angry feelings, and to not hurt herself or others.  Acting aggressively has been a way to feel in control and alive in the past, and she would like to develop better coping skills for anger, sadness, emptiness, and trauma-related flashbacks.  [FreeTextEntry9] : Ms. Correa was in good self-control at the time of the session, did not report moments of active dysregulation or overwhelming distress SHe notes that the 'impulse to lash out' often occurs in response to feeling stuck/trapped/misjudged or falsely characterized by other people in her life, and in these condisions 'if the situation presents itself it's like an opportunity to discharge some negative feelings.'  for example, after  a fight with family, if she feels slighted by a stranger on the street, she feels compelled to act in a retaliatory way. She increasingly wants to avoid this displacement of feelings, and is also increasingly troubled by these impulses, noting she has been mostly able to keep them in check.  She is a collaborative participant in examining these impulses and considering alternative actions.  [FreeTextEntry2] : Recent inpatient d/c and chronic family conflict and need for new coping skills present unique risks at present [FreeTextEntry3] : Initiated wraparound services, including health home, and [facility] preventatve case management

## 2023-01-19 NOTE — REASON FOR VISIT
[Patient preference] : as per patient preference [Starting, patient seen in-person within last 6 months] : Telehealth services are being started as patient has seen in-person within last 6 months. [Telehealth (audio & video) - Individual/Group] : This visit was provided via telehealth using real-time 2-way audio visual technology. [Medical Office: ([facility])___] : The provider was located at the medical office in [unfilled]. [Home] : The patient, [unfilled], was located at home, [unfilled], at the time of the visit. [Verbal consent obtained from patient/other participant(s)] : Verbal consent for telehealth/telephonic services obtained from patient/other participant(s) [Patient] : Patient [FreeTextEntry4] : 1pm [FreeTextEntry5] : 1:30 [FreeTextEntry3] : Pt last seen in-person during her hospitalization on [facility] [FreeTextEntry1] : Low energy, low mood, coping with intense family dynamics, coping with past trauma and related sxs, improving coping/self-regulation

## 2023-01-19 NOTE — PLAN
[Eye Movement Desensitization and Reprocessing Therapy (EMDR)] : Eye Movement Desensitization and Reprocessing Therapy (EMDR) [Psychodynamic Therapy] : Psychodynamic Therapy  [Supportive Therapy] : Supportive Therapy [FreeTextEntry2] : Emotional control. coping with stress coping with past trauma, strategies for managing family conflict, engaging in positive parenting practices.  [de-identified] : Ms. Correa was easily engaged today, and responded in a collaborative and thoughtful manner to my focus on assessing HI/SI. She averred that she spoke about SI and HI in her psychiatry appointment, but emphasized she was responding to questions 'in the way they were asked, I wanted to make a point' and emphasized that "I am stressed but I am not suicidal, i was talking about when things get intense like when I went to the hospital."  We discussed coping with family dynamics as they prepare for a family trip to [facility], and anticipating sources of stress and sources of relief. She is not looking forward to her trip, but would like to feel in control of her feelings and reactions. Reports tolerating medication. Reports f/u w health home.   [FreeTextEntry1] : Twice-Weekly individual therapy. Psychiatry w/ Dr. Guzman.  Reports tolerating new prozac regimen so far.  Engaged in family therapy w/ sister and mother. Referred for preventative services and [facility] -- paperwork completed.

## 2023-01-19 NOTE — PLAN
[Eye Movement Desensitization and Reprocessing Therapy (EMDR)] : Eye Movement Desensitization and Reprocessing Therapy (EMDR) [Psychodynamic Therapy] : Psychodynamic Therapy  [Supportive Therapy] : Supportive Therapy [FreeTextEntry2] : Emotional control. coping with stress coping with past trauma, strategies for managing family conflict, engaging in positive parenting practices.  [de-identified] : Ms. Correa was easily engaged today, and responded in a collaborative and thoughtful manner to my focus on assessing HI/SI. She averred that she spoke about SI and HI in her psychiatry appointment, but emphasized she was responding to questions 'in the way they were asked, I wanted to make a point' and emphasized that "I am stressed but I am not suicidal, i was talking about when things get intense like when I went to the hospital."  We discussed coping with family dynamics as they prepare for a family trip to [facility], and anticipating sources of stress and sources of relief. She is not looking forward to her trip, but would like to feel in control of her feelings and reactions. Reports tolerating medication. Reports f/u w health home.   [FreeTextEntry1] : Twice-Weekly individual therapy. Psychiatry w/ Dr. Guzman.  Reports tolerating new prozac regimen so far.  Engaged in family therapy w/ sister and mother. Referred for preventative services and [facility] -- paperwork completed.

## 2023-01-19 NOTE — END OF VISIT
[Duration of Psychotherapy Visit (minutes spent in synchronous communication): ____] : Duration of Psychotherapy Visit (minutes spent in synchronous communication): [unfilled] [Individual Psychotherapy for 16-37 minutes] : Individual Psychotherapy for 16-37 minutes [Teletherapy Service Provided] : The services provided in this session were delivered via tele-therapy [Licensed Clinician] : Licensed Clinician [FreeTextEntry3] : Home [FreeTextEntry5] : [facility]

## 2023-01-23 ENCOUNTER — APPOINTMENT (OUTPATIENT)
Dept: PSYCHIATRY | Facility: CLINIC | Age: 23
End: 2023-01-23

## 2023-01-24 ENCOUNTER — APPOINTMENT (OUTPATIENT)
Dept: PSYCHIATRY | Facility: CLINIC | Age: 23
End: 2023-01-24

## 2023-01-25 ENCOUNTER — APPOINTMENT (OUTPATIENT)
Dept: PSYCHIATRY | Facility: CLINIC | Age: 23
End: 2023-01-25

## 2023-01-25 ENCOUNTER — NON-APPOINTMENT (OUTPATIENT)
Age: 23
End: 2023-01-25

## 2023-01-25 NOTE — DISCUSSION/SUMMARY
[FreeTextEntry1] : Pt has not responded to multiple attempts at outreach regarding continuing family therapy. [facility] not continue at this time. [facility] Lyla Guzmna and Dr. Rosita South.

## 2023-01-26 ENCOUNTER — APPOINTMENT (OUTPATIENT)
Dept: PSYCHIATRY | Facility: CLINIC | Age: 23
End: 2023-01-26
Payer: MEDICAID

## 2023-01-26 ENCOUNTER — APPOINTMENT (OUTPATIENT)
Dept: PSYCHIATRY | Facility: CLINIC | Age: 23
End: 2023-01-26

## 2023-01-26 PROCEDURE — 90832 PSYTX W PT 30 MINUTES: CPT | Mod: 95

## 2023-01-30 ENCOUNTER — APPOINTMENT (OUTPATIENT)
Dept: PSYCHIATRY | Facility: CLINIC | Age: 23
End: 2023-01-30

## 2023-01-30 NOTE — PHYSICAL EXAM
[Well groomed] : well groomed [Cooperative] : cooperative [Depressed] : depressed [Anxious] : anxious [Full] : full [Clear] : clear [Linear/Goal Directed] : linear/goal directed [None] : none [Average] : average [WNL] : within normal limits [FreeTextEntry5] : collaborative, thoughtful [1] : 1 [3] : 3 [2] : 2

## 2023-01-30 NOTE — PLAN
[FreeTextEntry2] : Emotional control. coping with stress coping with past trauma, strategies for managing family conflict, engaging in positive parenting practices.  [Eye Movement Desensitization and Reprocessing Therapy (EMDR)] : Eye Movement Desensitization and Reprocessing Therapy (EMDR) [Psychodynamic Therapy] : Psychodynamic Therapy  [Supportive Therapy] : Supportive Therapy [de-identified] : Ms. Correa was easily engaged. She thoughtfully reflected on her trip with family members, where 'no one really got what they needed' and everyone was both trying to stay calm/controled but also feeling hurt/neglected. Noted significant efforts to avoid repeating troublesome patterns of interacting while with family, and reported feeling 'better' overall.  Reflected on 'new version of myself' and decreasing desire to return to old ways/relationships.   [FreeTextEntry1] : Twice-Weekly individual therapy. Psychiatry w/ Dr. Guzman.  Reports tolerating new prozac regimen so far.  Tenuously engaged in family therapy w/ sister and mother. Referred for preventative services and [facility] -- paperwork completed. Health Home services intitiated. Status of Preventative services unclear.

## 2023-01-30 NOTE — REASON FOR VISIT
[Patient preference] : as per patient preference [Starting, patient seen in-person within last 6 months] : Telehealth services are being started as patient has seen in-person within last 6 months. [Telehealth (audio & video) - Individual/Group] : This visit was provided via telehealth using real-time 2-way audio visual technology. [Medical Office: ([facility])___] : The provider was located at the medical office in [unfilled]. [Home] : The patient, [unfilled], was located at home, [unfilled], at the time of the visit. [Verbal consent obtained from patient/other participant(s)] : Verbal consent for telehealth/telephonic services obtained from patient/other participant(s) [FreeTextEntry4] : 1pm [FreeTextEntry5] : 1:30 [FreeTextEntry2] : on 8[facility] [FreeTextEntry3] : Pt last seen in-person during her hospitalization on 8UR[facility] [Patient] : Patient [FreeTextEntry1] : Low energy, low mood, coping with intense family dynamics, coping with past trauma and related sxs, improving coping/self-regulation

## 2023-01-30 NOTE — END OF VISIT
[Duration of Psychotherapy Visit (minutes spent in synchronous communication): ____] : Duration of Psychotherapy Visit (minutes spent in synchronous communication): [unfilled] [Individual Psychotherapy for 16-37 minutes] : Individual Psychotherapy for 16-37 minutes [Teletherapy Service Provided] : The services provided in this session were delivered via tele-therapy [FreeTextEntry3] : Home [FreeTextEntry5] : [facility] [Licensed Clinician] : Licensed Clinician

## 2023-01-30 NOTE — DISCUSSION/SUMMARY
[AM] : AM [Clinician] : Clinician [Patient] : Patient [Telephone] : telephone [Contact for Outreach] : contact for outreach [FreeTextEntry8] : 11 [FreeTextEntry7] : David missed yesterday's appointment b/c of a [employer] call at the same time. When reached by phone, we continued to discuss planning for her family trip. Reports feeling 'anxious but okay'. Overheard engaging in packing/preparing w/ her mom.  David asked to confirm her appointment on the day of her return -- "I get back early in the morning, so I can still make our time."  She was thoughtful and collaborative.
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
## 2023-01-31 ENCOUNTER — APPOINTMENT (OUTPATIENT)
Dept: PSYCHIATRY | Facility: CLINIC | Age: 23
End: 2023-01-31

## 2023-02-01 ENCOUNTER — APPOINTMENT (OUTPATIENT)
Dept: PSYCHIATRY | Facility: CLINIC | Age: 23
End: 2023-02-01
Payer: MEDICAID

## 2023-02-01 PROCEDURE — 99214 OFFICE O/P EST MOD 30 MIN: CPT | Mod: 95
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
## 2023-02-02 ENCOUNTER — NON-APPOINTMENT (OUTPATIENT)
Age: 23
End: 2023-02-02

## 2023-02-02 ENCOUNTER — APPOINTMENT (OUTPATIENT)
Dept: PSYCHIATRY | Facility: CLINIC | Age: 23
End: 2023-02-02

## 2023-02-02 NOTE — END OF VISIT
[Family Therapy With Client Present] : Family Therapy With Client Present  [FreeTextEntry3] : Home [FreeTextEntry5] : [facility]

## 2023-02-02 NOTE — PLAN
[Family Therapy (all types)] : Family Therapy (all types)  [Return in ____ week(s)] : Return in [unfilled] week(s) [FreeTextEntry2] : Communication patterns between family members \par Coexistence in the same house with family members \par  [de-identified] : Pt, pt's sister and pt's mother discussed resuming family therapy with the writer. Family discussed difficulties with boundaries in their life. Family discussed difficult experiences with pt's hospitalization at [facility] in December 2022 and negative feelings about communication with the family by various doctors.  Interventions centered around supporting articulation of difficulties as related to interpersonal dynamics and exacerbated by both acute and ongoing stressors. Family responded positively to continuing to talk about this discussion next session.

## 2023-02-06 ENCOUNTER — NON-APPOINTMENT (OUTPATIENT)
Age: 23
End: 2023-02-06

## 2023-02-06 ENCOUNTER — APPOINTMENT (OUTPATIENT)
Dept: PSYCHIATRY | Facility: CLINIC | Age: 23
End: 2023-02-06

## 2023-02-07 ENCOUNTER — APPOINTMENT (OUTPATIENT)
Dept: PSYCHIATRY | Facility: CLINIC | Age: 23
End: 2023-02-07
Payer: MEDICAID

## 2023-02-07 PROCEDURE — 90832 PSYTX W PT 30 MINUTES: CPT | Mod: 95

## 2023-02-07 NOTE — REASON FOR VISIT
[Patient preference] : as per patient preference [Other:___] : due to [unfilled] [Telehealth (audio & video) - Individual/Group] : This visit was provided via telehealth using real-time 2-way audio visual technology. [Patient] : Patient [FreeTextEntry4] : 11:10 [FreeTextEntry5] : 11:30 [FreeTextEntry2] : During inpatient hospitalization

## 2023-02-07 NOTE — PLAN
[Psychodynamic Therapy] : Psychodynamic Therapy  [Supportive Therapy] : Supportive Therapy [FreeTextEntry2] : IMpvoed mood, improved impulse control, improved relationships w/ family, coping w/ past traumas, reduced reliance on marijuana to regulate sleep and appetite.  [de-identified] : Ms. Correa was a bit late b/c of needing to change her daughter's diaper. Reflected on improved impulse control overall, evn as she is frustrated by the level of scrutiny she receives from family members. Noted she's been connecting with new people who are more aligned with her current priorities. Discussed self-criticism and self-loathing in this context and reflected on ways to be more kind to herself. She was thoughtful and collaborative.

## 2023-02-07 NOTE — RISK ASSESSMENT
[No, patient denies ideation or behavior] : No, patient denies ideation or behavior [FreeTextEntry8] : Reports no SI today/recently [FreeTextEntry7] : Ms. Correa reports some improvement in mood and overall emotion regulation and impulse control in spite of persistent stressors.

## 2023-02-07 NOTE — END OF VISIT
[Individual Psychotherapy for 16-37 minutes] : Individual Psychotherapy for 16-37 minutes [Teletherapy Service Provided] : The services provided in this session were delivered via tele-therapy [FreeTextEntry3] : Home [FreeTextEntry5] : GAR[facility] Flynn [Licensed Clinician] : Licensed Clinician

## 2023-02-07 NOTE — DISCUSSION/SUMMARY
[Individual Therapy] : individual therapy [Email] : email [Text] : text [FreeTextEntry3] : 02/02/2023 [FreeTextEntry4] : [facility] [FreeTextEntry5] : 02/07/2023 [FreeTextEntry6] : [facility] [FreeTextEntry7] : David did not show up to appointment today -- attempts to call her were not successful, her phone was disconnected.  Confirmed that she had attended her appointment on 02/01 w with no urgent issues noted. She also had family therapy scheduled today w/ Dr. Thomas.  Will confirm our next appointment time on 02/07/2023

## 2023-02-08 ENCOUNTER — APPOINTMENT (OUTPATIENT)
Dept: PSYCHIATRY | Facility: CLINIC | Age: 23
End: 2023-02-08

## 2023-02-09 ENCOUNTER — APPOINTMENT (OUTPATIENT)
Dept: PSYCHIATRY | Facility: CLINIC | Age: 23
End: 2023-02-09

## 2023-02-09 ENCOUNTER — NON-APPOINTMENT (OUTPATIENT)
Age: 23
End: 2023-02-09

## 2023-02-10 NOTE — END OF VISIT
[Family Therapy With Client Present] : Family Therapy With Client Present  [FreeTextEntry3] : Home [[facility]Entry5[facility]

## 2023-02-10 NOTE — PLAN
[Family Therapy (all types)] : Family Therapy (all types)  [Return in ____ week(s)] : Return in [unfilled] week(s) [FreeTextEntry2] : Communication patterns between family members \par Coexistence in the same house with family members \par  [de-identified] : Pt, pt's sister and pt's mother continued to discuss resuming family therapy with the writer. Pt reported wanting to discontinue family therapy, even if her sister and mother decide to continue. Family discussed difficulties with boundaries in their life and different members of the family having different experiences from each other, which causes feelings of anger.  Interventions centered around supporting articulation of difficulties as related to interpersonal dynamics and exacerbated by both acute and ongoing stressors. Family responded positively to continuing to talk about this discussion next session, although the identified patient may be changed to reflect pt's desire to no longer attend family sessions.

## 2023-02-13 ENCOUNTER — APPOINTMENT (OUTPATIENT)
Dept: PSYCHIATRY | Facility: CLINIC | Age: 23
End: 2023-02-13

## 2023-02-14 ENCOUNTER — APPOINTMENT (OUTPATIENT)
Dept: PSYCHIATRY | Facility: CLINIC | Age: 23
End: 2023-02-14

## 2023-02-14 ENCOUNTER — NON-APPOINTMENT (OUTPATIENT)
Age: 23
End: 2023-02-14

## 2023-02-16 ENCOUNTER — NON-APPOINTMENT (OUTPATIENT)
Age: 23
End: 2023-02-16

## 2023-02-16 ENCOUNTER — APPOINTMENT (OUTPATIENT)
Dept: PSYCHIATRY | Facility: CLINIC | Age: 23
End: 2023-02-16

## 2023-02-21 ENCOUNTER — APPOINTMENT (OUTPATIENT)
Dept: PSYCHIATRY | Facility: CLINIC | Age: 23
End: 2023-02-21
Payer: MEDICAID

## 2023-02-21 PROCEDURE — 90832 PSYTX W PT 30 MINUTES: CPT | Mod: 95

## 2023-02-21 NOTE — PLAN
[Family Therapy (all types)] : Family Therapy (all types)  [Return in ____ week(s)] : Return in [unfilled] week(s) [FreeTextEntry2] : Communication patterns between family members \par Coexistence in the same house with family members \par  [de-identified] : Pt, pt's sister and pt's mother continued to discuss ongoing difficulties in communication between the three of them. Pt's mother reported that pt's sister often feels left out and misunderstood. This led to discussions about the differences in communication styles and feelings between the three of them. Pt's mother described her own past experiences with her ex-partner, father of her daughters, and his death several years ago. Interventions centered around conceptualization of communication difficulties as related to interpersonal dynamics and exacerbated by both acute and ongoing stressors. Family responded in a mixed way to interventions, with pt and pt's sister expressing hesitation about continuing family therapy, and pt's mother wanting to continue family therapy.

## 2023-02-21 NOTE — END OF VISIT
[Family Therapy With Client Present] : Family Therapy With Client Present  [FreeTextEntry3] : Home [FreeTextEntry5] : [facility]

## 2023-02-23 ENCOUNTER — APPOINTMENT (OUTPATIENT)
Dept: PSYCHIATRY | Facility: CLINIC | Age: 23
End: 2023-02-23

## 2023-02-23 ENCOUNTER — APPOINTMENT (OUTPATIENT)
Dept: PSYCHIATRY | Facility: CLINIC | Age: 23
End: 2023-02-23
Payer: MEDICAID

## 2023-02-23 ENCOUNTER — NON-APPOINTMENT (OUTPATIENT)
Age: 23
End: 2023-02-23

## 2023-02-23 PROCEDURE — 90832 PSYTX W PT 30 MINUTES: CPT | Mod: 95

## 2023-02-23 NOTE — REASON FOR VISIT
[Patient preference] : as per patient preference [Other:___] : due to [unfilled] [Telephone (audio) - Individual/Group] : This telephonic visit was provided via audio only technology. [Technical or other issues] : Patient unable to effectively utilize tele-video due to technical or other issues. [Other Location: e.g. Home (Enter Location, City,State)___] : The provider was located at [unfilled]. [Home] : The patient, [unfilled], was located at home, [unfilled], at the time of the visit. [Verbal consent obtained from patient/other participant(s)] : Verbal consent for telehealth/telephonic services obtained from patient/other participant(s) [Patient] : Patient [FreeTextEntry4] : 1:10 [FreeTextEntry5] : 1:35 [FreeTextEntry2] : During inpatient stayhospitalization

## 2023-02-23 NOTE — RISK ASSESSMENT
[No, patient denies ideation or behavior] : No, patient denies ideation or behavior [FreeTextEntry8] : Reports no SI today/recently [FreeTextEntry7] : Ms. Correa reports efforts to maintain improvement in mood and overall emotion regulation and impulse control in spite of persistent stressors.

## 2023-02-23 NOTE — PHYSICAL EXAM
[Cooperative] : cooperative [Anxious] : anxious [Irritable] : irritable [Full] : full [Clear] : clear [Linear/Goal Directed] : linear/goal directed [Average] : average [WNL] : within normal limits

## 2023-02-23 NOTE — END OF VISIT
[Individual Psychotherapy for 16-37 minutes] : Individual Psychotherapy for 16-37 minutes [Teletherapy Service Provided] : The services provided in this session were delivered via tele-therapy [Licensed Clinician] : Licensed Clinician [Duration of Psychotherapy Visit (minutes spent in synchronous communication): ____] : Duration of Psychotherapy Visit (minutes spent in synchronous communication): [unfilled] [FreeTextEntry3] : Home [FreeTextEntry5] : [facility]

## 2023-02-23 NOTE — PLAN
[Psychodynamic Therapy] : Psychodynamic Therapy  [Supportive Therapy] : Supportive Therapy [Recommended Frequency of Visits: ____] : Recommended frequency of visits: [unfilled] [FreeTextEntry2] : Improved mood, improved impulse control, improved relationships w/ family, coping w/ past traumas, reduced reliance on marijuana to regulate sleep and appetite.  [de-identified] : Ms. Correa was at home with her sister and her daughter. Her sister was yelling from the next room, insistent that daivd was stating negative/incorrect facts, when we were not discussing the sister.  David noted her own efforts to stay in control and not get pulled into 'the usual shit in the family.'  she plans to puersue housing for her and her daughter through [facility] next week. She described recent interactions with her daughter that revealed their barrientos to David, and the importance of continuing to provide her daughter with a sense of safety and security, which she never had.  [FreeTextEntry1] : Individual therapy, medication management. \par Contact made with preventative services coordinators,. including Hilda Dang, PHD -- car[facility] was declined from the initial referral, and they 're persuing additional/new supports

## 2023-02-25 NOTE — PHYSICAL EXAM
[Cooperative] : cooperative [Irritable] : irritable [Anxious] : anxious [Full] : full [Clear] : clear [Linear/Goal Directed] : linear/goal directed [Average] : average [WNL] : within normal limits

## 2023-02-25 NOTE — REASON FOR VISIT
[Patient preference] : as per patient preference [Other:___] : due to [unfilled] [Telehealth (audio & video) - Individual/Group] : This visit was provided via telehealth using real-time 2-way audio visual technology. [Technical or other issues] : Patient unable to effectively utilize tele-video due to technical or other issues. [Other Location: e.g. Home (Enter Location, City,State)___] : The provider was located at [unfilled]. [Home] : The patient, [unfilled], was located at home, [unfilled], at the time of the visit. [Verbal consent obtained from patient/other participant(s)] : Verbal consent for telehealth/telephonic services obtained from patient/other participant(s) [Patient] : Patient [FreeTextEntry5] : 11:30 [FreeTextEntry4] : 1 [FreeTextEntry2] : During inpatient stayhospitalization

## 2023-02-25 NOTE — RISK ASSESSMENT
[No, patient denies ideation or behavior] : No, patient denies ideation or behavior [FreeTextEntry8] : Reports no SI today/recently. Reports some hopelessness.  [FreeTextEntry7] : Ms. Correa reports efforts to maintain improvement in mood and overall emotion regulation and impulse control in spite of persistent stressors.  Today she reported increased mood dystrgulation in the context of family stressors. She declined to do a new safety plan -- "those things are useless in the moment when I'm feeling overwhelmed."  She was collaborative in discussed modes of coping.

## 2023-02-25 NOTE — DISCUSSION/SUMMARY
[Individual Therapy] : individual therapy [Email] : email [Text] : text [FreeTextEntry3] : 02/02/2023 [FreeTextEntry4] : [facility] [FreeTextEntry5] : 02/07/2023 [FreeTextEntry7] : David did not show up to appointment today -- attempts to call her were not successful, her phone was disconnected.  Confirmed that she had attended her appointment on 02/01 w with no urgent issues noted. She also had family therapy scheduled today w/ Dr. Thomas.  Will confirm our next appointment time on 02/07/2023 [FreeTextEntry6] : [facility]

## 2023-02-25 NOTE — PLAN
[Psychodynamic Therapy] : Psychodynamic Therapy  [Supportive Therapy] : Supportive Therapy [Recommended Frequency of Visits: ____] : Recommended frequency of visits: [unfilled] [FreeTextEntry2] : Improved mood, improved impulse control, improved relationships w/ family, coping w/ past traumas, reduced reliance on marijuana to regulate sleep and appetite.  [de-identified] : Ms. Correa was at home with her daughter, who was sleeping. Discussed ongoing family stressors, and efforts to avoid old habits that 'get me into trougl. Noted how her motivations and priorities have changed, since her daughter was born, and in recent months as she feels more connected to her and aware of the importance of their relationships She is exploring options for new housing, and detailed efforts to obtain information from friends and agencies. She would like to provide a safe and consistent home environment for her daughter 'to not repeat things from my childhood." She feels let down by agencies who have failed to provide promised supports -- eg. being rejected by [facility] preventative service s-- but is focused on continuing her search. She was thoughtful, collaborative, insightful and well-regulated during session, in spite of distress.  [FreeTextEntry1] : Individual therapy, medication management, w/ Dr Guzman. \par Ongoing contact made with preventative services coordinators. including Hilda Dang, PHD -- ongoing efforts to engage her in appropriate services. Noted ongoing dilemmas around engaging in routine sessions given lack of privacy at home.

## 2023-02-27 ENCOUNTER — APPOINTMENT (OUTPATIENT)
Dept: PSYCHIATRY | Facility: CLINIC | Age: 23
End: 2023-02-27

## 2023-02-28 ENCOUNTER — APPOINTMENT (OUTPATIENT)
Dept: PSYCHIATRY | Facility: CLINIC | Age: 23
End: 2023-02-28
Payer: MEDICAID

## 2023-02-28 PROCEDURE — 90832 PSYTX W PT 30 MINUTES: CPT | Mod: 95

## 2023-03-01 ENCOUNTER — OUTPATIENT (OUTPATIENT)
Dept: OUTPATIENT SERVICES | Facility: HOSPITAL | Age: 23
LOS: 1 days | Discharge: HOME | End: 2023-03-01

## 2023-03-01 NOTE — PLAN
[FreeTextEntry2] : Improved mood, improved impulse control, improved relationships w/ family, coping w/ past traumas, reduced reliance on marijuana to regulate sleep and appetite.  [Psychodynamic Therapy] : Psychodynamic Therapy  [Supportive Therapy] : Supportive Therapy [de-identified] : Ms. Correa was at home with her daughter, who was playing close by. Discussed ongoing efforts to cope with family conflict, and remain 'in control' -- "I don't want to do things that will make it worse for me or my daughter." This relates to an increasingly more complex perspective on her role as  aparent, in providing a different kid of childhood from the one that she has. She is focussed on securing safe housing for herself and her daughter, and is troubled by how hard it is to  get information on supports -- she described calling and visiting multiple locations where friends have stayed. She often feels demoralized "but also there is no other way but to keep going." David was thoughtful and engaged throughout.  [Recommended Frequency of Visits: ____] : Recommended frequency of visits: [unfilled] [FreeTextEntry1] : Individual therapy, medication management. \par Contact made with preventative services coordinators,. including Hilda Dang, PHD -- david was declined from the initial preventative referral, and they 're pursuing additional/new supports. David currently less interested in [facility] as a source of treatment - stating that she needs to focus her time and efforts on housing, not being overwhelmed by other peoples' needs.  Will continue to explore tx needs and priorities

## 2023-03-01 NOTE — PHYSICAL EXAM
[Well groomed] : well groomed [Cooperative] : cooperative [Anxious] : anxious [Irritable] : irritable [Full] : full [Clear] : clear [Linear/Goal Directed] : linear/goal directed [Average] : average [WNL] : within normal limits

## 2023-03-02 ENCOUNTER — APPOINTMENT (OUTPATIENT)
Dept: PSYCHIATRY | Facility: CLINIC | Age: 23
End: 2023-03-02

## 2023-03-02 ENCOUNTER — NON-APPOINTMENT (OUTPATIENT)
Age: 23
End: 2023-03-02

## 2023-03-06 ENCOUNTER — APPOINTMENT (OUTPATIENT)
Dept: PSYCHIATRY | Facility: CLINIC | Age: 23
End: 2023-03-06

## 2023-03-07 ENCOUNTER — APPOINTMENT (OUTPATIENT)
Dept: PSYCHIATRY | Facility: CLINIC | Age: 23
End: 2023-03-07

## 2023-03-07 ENCOUNTER — NON-APPOINTMENT (OUTPATIENT)
Age: 23
End: 2023-03-07

## 2023-03-08 NOTE — PLAN
[Family Therapy (all types)] : Family Therapy (all types)  [Return in ____ week(s)] : Return in [unfilled] week(s) [FreeTextEntry2] : Communication patterns between family members \par Coexistence in the same house with family members \par  [de-identified] : Pt, pt's sister and pt's mother continued to discuss ongoing difficulties in communication between the three of them, which stem from historical difficulties in interpersonal interactions between the three of them involving difficulties with boundaries. Patterns of acceptable vs. unacceptable behavior were discussed, in terms of more appropriate and less appropriate ways of handling disagreement.  Interventions centered around conceptualization of communication difficulties as related to interpersonal dynamics and exacerbated by both acute and ongoing stressors. Family responded in a positive way to interventions, and to continuing family therapy.

## 2023-03-08 NOTE — END OF VISIT
[Family Therapy With Client Present] : Family Therapy With Client Present  [FreeTextEntry3] : Home [FreeTextEntry5] : [facility]

## 2023-03-09 ENCOUNTER — APPOINTMENT (OUTPATIENT)
Dept: PSYCHIATRY | Facility: CLINIC | Age: 23
End: 2023-03-09

## 2023-03-13 ENCOUNTER — APPOINTMENT (OUTPATIENT)
Dept: PSYCHIATRY | Facility: CLINIC | Age: 23
End: 2023-03-13

## 2023-03-13 ENCOUNTER — NON-APPOINTMENT (OUTPATIENT)
Age: 23
End: 2023-03-13

## 2023-03-14 ENCOUNTER — APPOINTMENT (OUTPATIENT)
Dept: PSYCHIATRY | Facility: CLINIC | Age: 23
End: 2023-03-14
Payer: MEDICAID

## 2023-03-14 PROCEDURE — 90832 PSYTX W PT 30 MINUTES: CPT | Mod: 95

## 2023-03-14 NOTE — REASON FOR VISIT
[Patient preference] : as per patient preference [Telehealth (audio & video) - Individual/Group] : This visit was provided via telehealth using real-time 2-way audio visual technology. [Technical or other issues] : Patient unable to effectively utilize tele-video due to technical or other issues. [Other Location: e.g. Home (Enter Location, City,State)___] : The provider was located at [unfilled]. [Home] : The patient, [unfilled], was located at home, [unfilled], at the time of the visit. [Verbal consent obtained from patient/other participant(s)] : Verbal consent for telehealth/telephonic services obtained from patient/other participant(s) [Patient] : Patient [FreeTextEntry4] : 2 [FreeTextEntry5] : 2:30 [FreeTextEntry2] : During inpatient stayhospitalization [FreeTextEntry1] : "I am overwhelmed"

## 2023-03-14 NOTE — RISK ASSESSMENT
[No, patient denies ideation or behavior] : No, patient denies ideation or behavior [FreeTextEntry8] : Reports no SI today/recently [FreeTextEntry7] : Ms. Correa reports efforts to maintain improvement in mood and overall emotion regulation and impulse control in spite of persistent stressors.  Ms Correa described efforts to stay focussed on self-control.

## 2023-03-14 NOTE — END OF VISIT
[Individual Psychotherapy for 16-37 minutes] : Individual Psychotherapy for 16-37 minutes [Teletherapy Service Provided] : The services provided in this session were delivered via tele-therapy [Licensed Clinician] : Licensed Clinician [Duration of Psychotherapy Visit (minutes spent in synchronous communication): ____] : Duration of Psychotherapy Visit (minutes spent in synchronous communication): [unfilled] [FreeTextEntry5] : [facility] [FreeTextEntry3] : Home

## 2023-03-14 NOTE — PLAN
[Psychodynamic Therapy] : Psychodynamic Therapy  [Supportive Therapy] : Supportive Therapy [Recommended Frequency of Visits: ____] : Recommended frequency of visits: [unfilled] [FreeTextEntry2] : Improved mood, improved impulse control, improved relationships w/ family, coping w/ past traumas, reduced reliance on marijuana to regulate sleep and appetite.  [de-identified] : Ms. Correa was at home with her daughter. they were seen together at times, interacting in a loving and connected way. ms. Correa described frustration w/ the ineffectiveness of various interventions and a sense that 'nothing ever changes in big ways.'  she is focused on securing housing, and has completed various shelter applications. She als completed an application for anger management interventions that she hopes to access - noting that she hopes it is different than DBT, which she has 'legit done a few times already' and found it to be hard to translate skills learned in groups to situations in life. States that she has not taken medication for some time now, with no changes in mood noted.  [FreeTextEntry1] : Individual therapy, medication management. \par Contact made with preventative services coordinators,. including Hilda Dang, PHD -- David pizarro [facility] intake, b/c of lack of confidence in therapeutic interventions and a wish to focus on work/housing. Will continue to explore tx needs and priorities

## 2023-03-16 ENCOUNTER — APPOINTMENT (OUTPATIENT)
Dept: PSYCHIATRY | Facility: CLINIC | Age: 23
End: 2023-03-16

## 2023-03-20 ENCOUNTER — APPOINTMENT (OUTPATIENT)
Dept: PSYCHIATRY | Facility: CLINIC | Age: 23
End: 2023-03-20

## 2023-03-21 ENCOUNTER — APPOINTMENT (OUTPATIENT)
Dept: PSYCHIATRY | Facility: CLINIC | Age: 23
End: 2023-03-21
Payer: MEDICAID

## 2023-03-21 PROCEDURE — 90832 PSYTX W PT 30 MINUTES: CPT | Mod: 95

## 2023-03-22 NOTE — PLAN
[Psychodynamic Therapy] : Psychodynamic Therapy  [Supportive Therapy] : Supportive Therapy [Recommended Frequency of Visits: ____] : Recommended frequency of visits: [unfilled] [FreeTextEntry2] : Improved mood, improved impulse control, improved relationships w/ family, coping w/ past traumas, reduced reliance on marijuana to regulate sleep and appetite.  [de-identified] : Ms. Correa was at home. She noted it's her birthday, and is frustrated by the mixed messages she's receiving from family -- 'let's make a special plan' -- but then efforts to communicate wishes and preferences are ignored. She was open and direct in her frustration and disappointment. OVer the course of the session, she demonstrated thoughtfulness and insight, and was increasingly able to approach her frustrations with some focus on getting her actual needs met. She was collaborative and engaged throughout.  [FreeTextEntry1] : Individual therapy, medication management. \par  Will continue to explore tx needs and priorities\par Preventative services initiated -- [facility] is the agency they've been assigned to work with.

## 2023-03-22 NOTE — END OF VISIT
[Duration of Psychotherapy Visit (minutes spent in synchronous communication): ____] : Duration of Psychotherapy Visit (minutes spent in synchronous communication): [unfilled] [Individual Psychotherapy for 16-37 minutes] : Individual Psychotherapy for 16-37 minutes [Teletherapy Service Provided] : The services provided in this session were delivered via tele-therapy [Licensed Clinician] : Licensed Clinician [FreeTextEntry3] : Home [FreeTextEntry5] : [facility]

## 2023-03-22 NOTE — RISK ASSESSMENT
[No, patient denies ideation or behavior] : No, patient denies ideation or behavior [FreeTextEntry8] : Reports no SI today/recently. Reports ongoing efforts to stay in behavioral control and focussed on long-term priorities and her daughter's wellbeing.  [FreeTextEntry7] : Ms. Correa reports efforts to maintain improvement in mood and overall emotion regulation and impulse control in spite of persistent stressors.  Ms Correa described efforts to stay focussed on self-control.

## 2023-03-22 NOTE — REASON FOR VISIT
[Patient preference] : as per patient preference [Telehealth (audio & video) - Individual/Group] : This visit was provided via telehealth using real-time 2-way audio visual technology. [Medical Office: ([facility])___] : The provider was located at the medical office in [unfilled]. [Home] : The patient, [unfilled], was located at home, [unfilled], at the time of the visit. [Verbal consent obtained from patient/other participant(s)] : Verbal consent for telehealth/telephonic services obtained from patient/other participant(s) [Patient] : Patient [FreeTextEntry4] : 1 [FreeTextEntry5] : 1:30 [FreeTextEntry2] : During inpatient hospitalization [FreeTextEntry1] : "I am overwhelmed"

## 2023-03-23 ENCOUNTER — APPOINTMENT (OUTPATIENT)
Dept: PSYCHIATRY | Facility: CLINIC | Age: 23
End: 2023-03-23

## 2023-03-27 ENCOUNTER — APPOINTMENT (OUTPATIENT)
Dept: PSYCHIATRY | Facility: CLINIC | Age: 23
End: 2023-03-27

## 2023-03-28 ENCOUNTER — APPOINTMENT (OUTPATIENT)
Dept: PSYCHIATRY | Facility: CLINIC | Age: 23
End: 2023-03-28

## 2023-03-30 ENCOUNTER — APPOINTMENT (OUTPATIENT)
Dept: PSYCHIATRY | Facility: CLINIC | Age: 23
End: 2023-03-30

## 2023-04-03 ENCOUNTER — APPOINTMENT (OUTPATIENT)
Dept: PSYCHIATRY | Facility: CLINIC | Age: 23
End: 2023-04-03

## 2023-04-06 ENCOUNTER — APPOINTMENT (OUTPATIENT)
Dept: PSYCHIATRY | Facility: CLINIC | Age: 23
End: 2023-04-06
Payer: MEDICAID

## 2023-04-06 ENCOUNTER — APPOINTMENT (OUTPATIENT)
Dept: PSYCHIATRY | Facility: CLINIC | Age: 23
End: 2023-04-06

## 2023-04-06 PROCEDURE — 90832 PSYTX W PT 30 MINUTES: CPT | Mod: 95

## 2023-04-07 NOTE — RISK ASSESSMENT
[No, patient denies ideation or behavior] : No, patient denies ideation or behavior [FreeTextEntry8] : Reports no SI recently. Reports ongoing efforts to stay in behavioral control and focussed on long-term priorities and her daughter's wellbeing.  [FreeTextEntry7] : Ms. Correa reports efforts to maintain improvement in mood and overall emotion regulation and impulse control in spite of persistent stressors.  Ms Correa describes efforts to stay focussed on self-control.

## 2023-04-07 NOTE — PHYSICAL EXAM
[Well groomed] : well groomed [Cooperative] : cooperative [Depressed] : depressed [Anxious] : anxious [Full] : full [Clear] : clear [Linear/Goal Directed] : linear/goal directed [Average] : average [WNL] : within normal limits

## 2023-04-07 NOTE — PLAN
[FreeTextEntry2] : Improved mood, improved impulse control, improved relationships w/ family, coping w/ past traumas, reduced reliance on marijuana to regulate sleep and appetite.  [Psychodynamic Therapy] : Psychodynamic Therapy  [Supportive Therapy] : Supportive Therapy [de-identified] : Ms. Correa was at home, observed interacting with her daughter. She spoke about trying to focus on her own priorities in terms of establishing a safe life for her daughter amid tensions and conflicting advise at home. She explored thoughts/feelings about finding regular childcare for [place], when her family cautions against outside care.  [Recommended Frequency of Visits: ____] : Recommended frequency of visits: [unfilled] [FreeTextEntry1] : Individual therapy, medication management. \par Will continue to explore tx needs and priorities\par Preventative services initiated -- [facility] is the agency She has been assigned to work with, however the preventative worker told carolann she didn't have much to offer.

## 2023-04-07 NOTE — REASON FOR VISIT
[Patient preference] : as per patient preference [Continuity of care] : to ensure continuity of care [Telehealth (audio & video) - Individual/Group] : This visit was provided via telehealth using real-time 2-way audio visual technology. [Medical Office: ([facility])___] : The provider was located at the medical office in [unfilled]. [Home] : The patient, [unfilled], was located at home, [unfilled], at the time of the visit. [Verbal consent obtained from patient/other participant(s)] : Verbal consent for telehealth/telephonic services obtained from patient/other participant(s) [FreeTextEntry4] : 12:40 [FreeTextEntry5] : 1 [FreeTextEntry2] : During inpatient hospitalization [Patient] : Patient [FreeTextEntry1] : "I am overwhelmed"

## 2023-04-10 ENCOUNTER — APPOINTMENT (OUTPATIENT)
Dept: PSYCHIATRY | Facility: CLINIC | Age: 23
End: 2023-04-10

## 2023-04-10 DIAGNOSIS — F41.9 ANXIETY DISORDER, UNSPECIFIED: ICD-10-CM

## 2023-04-11 ENCOUNTER — APPOINTMENT (OUTPATIENT)
Dept: PSYCHIATRY | Facility: CLINIC | Age: 23
End: 2023-04-11
Payer: MEDICAID

## 2023-04-11 PROCEDURE — 90832 PSYTX W PT 30 MINUTES: CPT | Mod: 95

## 2023-04-12 NOTE — PLAN
[Psychodynamic Therapy] : Psychodynamic Therapy  [Supportive Therapy] : Supportive Therapy [Recommended Frequency of Visits: ____] : Recommended frequency of visits: [unfilled] [FreeTextEntry2] : Improved mood, improved impulse control, improved relationships w/ family, coping w/ past traumas, reduced reliance on marijuana to regulate sleep and appetite.  [de-identified] : Ms. Correa was out in the community, on a walk with her daughter. She had taken time away from home, to get some space and self-regulate in the context of intense family dynamics. Discussed different 'codes of conduct' in relationships/groups, and her efforts to balance competing codes, eithics and loyalties as things evolve, and as she is a mother. Discussed ways in which her efforts are often misunderstood and misinterpreted by others. she was kind, thoughtful, engaged, collaborative.  [FreeTextEntry1] : Individual therapy, medication management. \par  Will continue to explore tx needs and priorities\par Preventative services initiated -- but David's efforts to clarify access and f/u with case workers has had mixed success.

## 2023-04-12 NOTE — REASON FOR VISIT
[Patient preference] : as per patient preference [Medical Office: ([facility])___] : The provider was located at the medical office in [unfilled]. [Home] : The patient, [unfilled], was located at home, [unfilled], at the time of the visit. [Verbal consent obtained from patient/other participant(s)] : Verbal consent for telehealth/telephonic services obtained from patient/other participant(s) [Patient] : Patient [Telephone (audio) - Individual/Group] : This telephonic visit was provided via audio only technology. [Technical or other issues] : Patient unable to effectively utilize tele-video due to technical or other issues. [FreeTextEntry4] : 1 [FreeTextEntry5] : 1:30 [FreeTextEntry2] : During inpatient hospitalization [FreeTextEntry1] : "I am overwhelmed"

## 2023-04-13 ENCOUNTER — APPOINTMENT (OUTPATIENT)
Dept: PSYCHIATRY | Facility: CLINIC | Age: 23
End: 2023-04-13
Payer: MEDICAID

## 2023-04-13 PROCEDURE — 90832 PSYTX W PT 30 MINUTES: CPT | Mod: 95

## 2023-04-16 NOTE — REASON FOR VISIT
[Patient preference] : as per patient preference [Telephone (audio) - Individual/Group] : This telephonic visit was provided via audio only technology. [Technical or other issues] : Patient unable to effectively utilize tele-video due to technical or other issues. [Medical Office: ([facility])___] : The provider was located at the medical office in [unfilled]. [Home] : The patient, [unfilled], was located at home, [unfilled], at the time of the visit. [Verbal consent obtained from patient/other participant(s)] : Verbal consent for telehealth/telephonic services obtained from patient/other participant(s) [Patient] : Patient [FreeTextEntry4] : 11am [FreeTextEntry5] : 11:30am [FreeTextEntry2] : During inpatient hospitalization [FreeTextEntry1] : "I am overwhelmed"

## 2023-04-16 NOTE — PHYSICAL EXAM
[Well groomed] : well groomed [Cooperative] : cooperative [Euthymic] : euthymic [Anxious] : anxious [Full] : full [Clear] : clear [Linear/Goal Directed] : linear/goal directed [Average] : average [WNL] : within normal limits

## 2023-04-16 NOTE — PLAN
[Psychodynamic Therapy] : Psychodynamic Therapy  [Supportive Therapy] : Supportive Therapy [Recommended Frequency of Visits: ____] : Recommended frequency of visits: [unfilled] [FreeTextEntry2] : Improved mood, improved impulse control, improved relationships w/ family, coping w/ past traumas, reduced reliance on marijuana to regulate sleep and appetite.  [de-identified] : Ms. Correa joined the session from the local library, where her and Phoenix were spending the morning. They were the only ones there, which afforded them a calm opportunity to explore together, in a 'neutral' and pleasant scene. Observed phoenix's lisa in mastery and David's lisa in observing her daughter, and reflected on developmental processes and the bittersweet manifestations of time. David outlined efforts to establish appropriate boundaries in her family relationships, and plan for a stable future for her and her daughter. She was thoughtful, insightful, well-related and engaged.  [FreeTextEntry1] : Individual therapy, medication management. \par  Will continue to explore tx needs and priorities\par Preventative services initiated -- but David's efforts to clarify access and f/u with case workers has had mixed success. \par David reports restarting antidepressant medication -- will reach out to Dr. Guzman as needed.

## 2023-04-17 ENCOUNTER — APPOINTMENT (OUTPATIENT)
Dept: PSYCHIATRY | Facility: CLINIC | Age: 23
End: 2023-04-17

## 2023-04-17 NOTE — BH DISCHARGE NOTE NURSING/SOCIAL WORK/PSYCH REHAB - NSSCCARECORDBH_PSY_ALL_CORE
[FreeTextEntry1] : RV 1 month, if no improvement consider CT scan\par Dry ear precayutions
Community Resource

## 2023-04-18 ENCOUNTER — APPOINTMENT (OUTPATIENT)
Dept: PSYCHIATRY | Facility: CLINIC | Age: 23
End: 2023-04-18

## 2023-04-20 ENCOUNTER — APPOINTMENT (OUTPATIENT)
Dept: PSYCHIATRY | Facility: CLINIC | Age: 23
End: 2023-04-20

## 2023-04-20 ENCOUNTER — NON-APPOINTMENT (OUTPATIENT)
Age: 23
End: 2023-04-20

## 2023-04-24 ENCOUNTER — APPOINTMENT (OUTPATIENT)
Dept: PSYCHIATRY | Facility: CLINIC | Age: 23
End: 2023-04-24

## 2023-04-25 ENCOUNTER — APPOINTMENT (OUTPATIENT)
Dept: PSYCHIATRY | Facility: CLINIC | Age: 23
End: 2023-04-25

## 2023-04-25 ENCOUNTER — NON-APPOINTMENT (OUTPATIENT)
Age: 23
End: 2023-04-25

## 2023-04-27 ENCOUNTER — APPOINTMENT (OUTPATIENT)
Dept: PSYCHIATRY | Facility: CLINIC | Age: 23
End: 2023-04-27

## 2023-05-01 ENCOUNTER — APPOINTMENT (OUTPATIENT)
Dept: PSYCHIATRY | Facility: CLINIC | Age: 23
End: 2023-05-01

## 2023-05-02 ENCOUNTER — APPOINTMENT (OUTPATIENT)
Dept: PSYCHIATRY | Facility: CLINIC | Age: 23
End: 2023-05-02
Payer: MEDICAID

## 2023-05-02 PROCEDURE — 90832 PSYTX W PT 30 MINUTES: CPT | Mod: 95

## 2023-05-02 NOTE — REASON FOR VISIT
[Patient preference] : as per patient preference [Telephone (audio) - Individual/Group] : This telephonic visit was provided via audio only technology. [Technical or other issues] : Patient unable to effectively utilize tele-video due to technical or other issues. [Medical Office: ([facility])___] : The provider was located at the medical office in [unfilled]. [Home] : The patient, [unfilled], was located at home, [unfilled], at the time of the visit. [Verbal consent obtained from patient/other participant(s)] : Verbal consent for telehealth/telephonic services obtained from patient/other participant(s) [FreeTextEntry4] : 1pm [FreeTextEntry5] : 1:30pm [FreeTextEntry2] : During inpatient  hospitalization [Patient] : Patient [FreeTextEntry1] : "I am overwhelmed"

## 2023-05-02 NOTE — END OF VISIT
[Duration of Psychotherapy Visit (minutes spent in synchronous communication): ____] : Duration of Psychotherapy Visit (minutes spent in synchronous communication): [unfilled] [Individual Psychotherapy for 16-37 minutes] : Individual Psychotherapy for 16-37 minutes [Teletherapy Service Provided] : The services provided in this session were delivered via tele-therapy [FreeTextEntry3] : Home [FreeTextEntry5] : [facility] [Licensed Clinician] : Licensed Clinician

## 2023-05-02 NOTE — PLAN
[FreeTextEntry2] : Improved mood, improved impulse control, improved relationships w/ family, coping w/ past traumas, reduced reliance on marijuana to regulate sleep and appetite.  [Psychodynamic Therapy] : Psychodynamic Therapy  [Supportive Therapy] : Supportive Therapy [de-identified] : Ms. Corera was multi-tasking, trying to manage household responsibilties  and ADLs, while her daughter was cranky after construction outside woke her up from a nap.  Discussed efforts to maintain self-regular and self-control, even when frustrated. She was thoughtful and seemed engaged, even when reporting feeling disconnected.  [Recommended Frequency of Visits: ____] : Recommended frequency of visits: [unfilled] [FreeTextEntry1] : Individual therapy, medication management. \par  Will continue to explore tx needs and priorities\par Preventative services initiated -- but David's efforts to clarify access and f/u with case workers has had mixed success. \par David reports restarting antidepressant medication -- will reach out to Dr. Guzman as needed.

## 2023-05-02 NOTE — RISK ASSESSMENT
[No, patient denies ideation or behavior] : No, patient denies ideation or behavior [FreeTextEntry8] : Reports no SI recently. Reports ongoing efforts to stay in behavioral control and focussed on long-term priorities and her daughter's wellbeing.  [FreeTextEntry7] : Ms. Correa reports efforts to maintain improvement in mood and overall emotion regulation and impulse control in spite of persistent stressors.  Ms Correa consistently describes efforts to stay focussed on self-control.

## 2023-05-04 ENCOUNTER — APPOINTMENT (OUTPATIENT)
Dept: PSYCHIATRY | Facility: CLINIC | Age: 23
End: 2023-05-04

## 2023-05-08 ENCOUNTER — APPOINTMENT (OUTPATIENT)
Dept: PSYCHIATRY | Facility: CLINIC | Age: 23
End: 2023-05-08

## 2023-05-09 ENCOUNTER — APPOINTMENT (OUTPATIENT)
Dept: PSYCHIATRY | Facility: CLINIC | Age: 23
End: 2023-05-09

## 2023-05-10 ENCOUNTER — NON-APPOINTMENT (OUTPATIENT)
Age: 23
End: 2023-05-10

## 2023-05-11 ENCOUNTER — APPOINTMENT (OUTPATIENT)
Dept: PSYCHIATRY | Facility: CLINIC | Age: 23
End: 2023-05-11

## 2023-05-15 ENCOUNTER — APPOINTMENT (OUTPATIENT)
Dept: PSYCHIATRY | Facility: CLINIC | Age: 23
End: 2023-05-15

## 2023-05-16 ENCOUNTER — APPOINTMENT (OUTPATIENT)
Dept: PSYCHIATRY | Facility: CLINIC | Age: 23
End: 2023-05-16
Payer: MEDICAID

## 2023-05-16 PROCEDURE — 90832 PSYTX W PT 30 MINUTES: CPT | Mod: 95

## 2023-05-17 NOTE — PLAN
[FreeTextEntry2] : Improved mood, improved impulse control, improved relationships w/ family, coping w/ past traumas, reduced reliance on marijuana to regulate sleep and appetite.  [Psychodynamic Therapy] : Psychodynamic Therapy  [Supportive Therapy] : Supportive Therapy [de-identified] : Ms. Correa was seen in her home, interacting lovingly and attentively with her daughter, who had recently woken up from her nap and wanted cuddles. Carine spoke about coping with perisistent uncertainty as she struggles to get information about housing resources and options. SHe has been working as needed to maintain her position. She was thoughtful and collaborative, and described feeling disconnected and distant She was observed laughing and giggling with her daughter when they were interacting.   [Recommended Frequency of Visits: ____] : Recommended frequency of visits: [unfilled] [FreeTextEntry1] : Individual therapy, medication management. Reports medications have not helped after re-initiating them recently. \par  Will continue to explore tx needs and priorities\par Preventative services initiated -- but unable to access them.  Housing resources sought. \par David will reach out to Dr. Guzman as needed.

## 2023-05-17 NOTE — END OF VISIT
[Duration of Psychotherapy Visit (minutes spent in synchronous communication): ____] : Duration of Psychotherapy Visit (minutes spent in synchronous communication): [unfilled] [Individual Psychotherapy for 16-37 minutes] : Individual Psychotherapy for 16-37 minutes [Teletherapy Service Provided] : The services provided in this session were delivered via tele-therapy [FreeTextEntry3] : Home [FreeTextEntry5] : [facility] [Licensed Clinician] : Licensed Clinician

## 2023-05-17 NOTE — REASON FOR VISIT
[Patient preference] : as per patient preference [Telephone (audio) - Individual/Group] : This telephonic visit was provided via audio only technology. [Technical or other issues] : Patient unable to effectively utilize tele-video due to technical or other issues. [Medical Office: ([facility])___] : The provider was located at the medical office in [unfilled]. [Home] : The patient, [unfilled], was located at home, [unfilled], at the time of the visit. [Verbal consent obtained from patient/other participant(s)] : Verbal consent for telehealth/telephonic services obtained from patient/other participant(s) [FreeTextEntry4] : 1pm [FreeTextEntry5] : 1:25pm [FreeTextEntry2] : During inpatient  hospitalization [Patient] : Patient [FreeTextEntry1] : "I am overwhelmed"

## 2023-05-18 ENCOUNTER — APPOINTMENT (OUTPATIENT)
Dept: PSYCHIATRY | Facility: CLINIC | Age: 23
End: 2023-05-18
Payer: MEDICAID

## 2023-05-18 PROCEDURE — 90832 PSYTX W PT 30 MINUTES: CPT | Mod: 95

## 2023-05-19 NOTE — END OF VISIT
[Duration of Psychotherapy Visit (minutes spent in synchronous communication): ____] : Duration of Psychotherapy Visit (minutes spent in synchronous communication): [unfilled] [Individual Psychotherapy for 16-37 minutes] : Individual Psychotherapy for 16-37 minutes [Teletherapy Service Provided] : The services provided in this session were delivered via tele-therapy [FreeTextEntry3] : Home [FreeTextEntry5] : [facility] [Licensed Clinician] : Licensed Clinician

## 2023-05-19 NOTE — RISK ASSESSMENT
[No, patient denies ideation or behavior] : No, patient denies ideation or behavior [FreeTextEntry7] : Ms. Correa reports efforts to maintain improvement in mood and overall emotion regulation and impulse control in spite of persistent stressors.  Ms Correa consistently describes efforts to stay focussed on self-control.  [FreeTextEntry8] : Reports no SI recently. Reports ongoing efforts to stay in behavioral control and focussed on long-term priorities and her daughter's wellbeing.

## 2023-05-19 NOTE — PLAN
[FreeTextEntry2] : Improved mood, improved impulse control, improved relationships w/ family, coping w/ past traumas, reduced reliance on marijuana to regulate sleep and appetite.  [Psychodynamic Therapy] : Psychodynamic Therapy  [Supportive Therapy] : Supportive Therapy [de-identified] : Ms. Correa was engaged with her daughter throughout the session, laughing and crating games whlle they engaged in household tasks. She spoke about ongoing efforts to provide a stable life, and efforts to contain distress. Noted how her experience of the world has changed since becoming a motherin that she is likely to stay close to home and consider danger in the environment.  Encouraged pt to consider in-person session so that we can to more targeted work on distress tolerance.  [Recommended Frequency of Visits: ____] : Recommended frequency of visits: [unfilled] [FreeTextEntry1] : Individual therapy, medication management. Reports medications have not helped after re-initiating them recently. \par  Will continue to explore tx needs and priorities\par Preventative services initiated -- but unable to access them.  Housing resources sought. \par David will reach out to Dr. Guzman as needed.

## 2023-05-19 NOTE — REASON FOR VISIT
[Patient preference] : as per patient preference [Telephone (audio) - Individual/Group] : This telephonic visit was provided via audio only technology. [Technical or other issues] : Patient unable to effectively utilize tele-video due to technical or other issues. [Medical Office: ([facility])___] : The provider was located at the medical office in [unfilled]. [Home] : The patient, [unfilled], was located at home, [unfilled], at the time of the visit. [Verbal consent obtained from patient/other participant(s)] : Verbal consent for telehealth/telephonic services obtained from patient/other participant(s) [FreeTextEntry4] : 11am [FreeTextEntry5] : 1137am [FreeTextEntry2] : During inpatient  hospitalization [Patient] : Patient [FreeTextEntry1] : "I am overwhelmed"

## 2023-05-22 ENCOUNTER — APPOINTMENT (OUTPATIENT)
Dept: PSYCHIATRY | Facility: CLINIC | Age: 23
End: 2023-05-22

## 2023-05-23 ENCOUNTER — APPOINTMENT (OUTPATIENT)
Dept: PSYCHIATRY | Facility: CLINIC | Age: 23
End: 2023-05-23

## 2023-05-25 ENCOUNTER — APPOINTMENT (OUTPATIENT)
Dept: PSYCHIATRY | Facility: CLINIC | Age: 23
End: 2023-05-25

## 2023-05-30 ENCOUNTER — NON-APPOINTMENT (OUTPATIENT)
Age: 23
End: 2023-05-30

## 2023-05-30 ENCOUNTER — APPOINTMENT (OUTPATIENT)
Dept: PSYCHIATRY | Facility: CLINIC | Age: 23
End: 2023-05-30

## 2023-06-01 ENCOUNTER — APPOINTMENT (OUTPATIENT)
Dept: PSYCHIATRY | Facility: CLINIC | Age: 23
End: 2023-06-01
Payer: MEDICAID

## 2023-06-01 ENCOUNTER — APPOINTMENT (OUTPATIENT)
Dept: PSYCHIATRY | Facility: CLINIC | Age: 23
End: 2023-06-01

## 2023-06-01 PROCEDURE — 90832 PSYTX W PT 30 MINUTES: CPT | Mod: 95

## 2023-06-02 NOTE — PLAN
[FreeTextEntry2] : Improved mood, improved impulse control, improved relationships w/ family, coping w/ past traumas, reduced reliance on marijuana to regulate sleep and appetite.  [Psychodynamic Therapy] : Psychodynamic Therapy  [Supportive Therapy] : Supportive Therapy [de-identified] : Ms. Correa was seen in her home, interacting attentively with her daughter, while preparing their lunch.  She spoke about managing frustration with providers and healthcare systems that seem to work against her efforts to attain emotional stability. Discussed her changing maternal identity, and better understanding her feelings of protectiveness and care -- "now, if I'm away for a few hours I miss her!."  She was thoughtful and collaborative.  [Recommended Frequency of Visits: ____] : Recommended frequency of visits: [unfilled] [FreeTextEntry1] : Individual therapy, medication management. Reports medications have not helped after re-initiating them recently. \par  Will continue to explore tx needs and priorities\par Preventative services initiated -- but unable to access them.  Housing resources sought. \par David will be followed by Dr. Chevy Gumzan is on leave.

## 2023-06-02 NOTE — REASON FOR VISIT
[Patient preference] : as per patient preference [Telephone (audio) - Individual/Group] : This telephonic visit was provided via audio only technology. [Technical or other issues] : Patient unable to effectively utilize tele-video due to technical or other issues. [Medical Office: ([facility])___] : The provider was located at the medical office in [unfilled]. [Home] : The patient, [unfilled], was located at home, [unfilled], at the time of the visit. [Verbal consent obtained from patient/other participant(s)] : Verbal consent for telehealth/telephonic services obtained from patient/other participant(s) [FreeTextEntry4] : 1133am [FreeTextEntry5] : 12pm [FreeTextEntry2] : During inpatient  hospitalization [Patient] : Patient [FreeTextEntry1] : "I am overwhelmed"

## 2023-06-02 NOTE — PHYSICAL EXAM
[Cooperative] : cooperative [Euthymic] : euthymic [Anxious] : anxious [Clear] : clear [Full] : full [Linear/Goal Directed] : linear/goal directed [Average] : average [WNL] : within normal limits

## 2023-06-02 NOTE — END OF VISIT
[Duration of Psychotherapy Visit (minutes spent in synchronous communication): ____] : Duration of Psychotherapy Visit (minutes spent in synchronous communication): [unfilled] [Individual Psychotherapy for 16-37 minutes] : Individual Psychotherapy for 16-37 minutes [Teletherapy Service Provided] : The services provided in this session were delivered via tele-therapy [FreeTextEntry3] : Home [FreeTextEntry5] : [facility]ARC[facility] Flynn [Licensed Clinician] : Licensed Clinician

## 2023-06-05 ENCOUNTER — APPOINTMENT (OUTPATIENT)
Dept: PSYCHIATRY | Facility: CLINIC | Age: 23
End: 2023-06-05

## 2023-06-06 ENCOUNTER — APPOINTMENT (OUTPATIENT)
Dept: PSYCHIATRY | Facility: CLINIC | Age: 23
End: 2023-06-06
Payer: MEDICAID

## 2023-06-06 PROCEDURE — 90832 PSYTX W PT 30 MINUTES: CPT | Mod: 95

## 2023-06-06 PROCEDURE — 99214 OFFICE O/P EST MOD 30 MIN: CPT | Mod: 95

## 2023-06-06 NOTE — RISK ASSESSMENT
[Yes, patient reports ideation or behavior] : Yes, patient reports ideation or behavior [(1) Less than once a week] : Less than once a week [(1) Fleeting - few seconds/minutes] : Fleeting - a few seconds or minutes [(1) Easily able to control thoughts] : Easily able to control thoughts [(1) Deterrents definitely stopped you from attempting suicide] : Deterrents definitely stopped you from attempting suicide [(3) Equally to get attention, revenge or a reaction from others and to end/stop the pain] : Equally to get attention, revenge or a reaction from others and to end/stop the pain

## 2023-06-06 NOTE — REASON FOR VISIT
[Patient preference] : as per patient preference [Telehealth (audio & video) - Individual/Group] : This visit was provided via telehealth using real-time 2-way audio visual technology. [Other Location: e.g. Home (Enter Location, City,State)___] : The provider was located at [unfilled]. [Home] : The patient, [unfilled], was located at home, [unfilled], at the time of the visit. [Verbal consent obtained from patient/other participant(s)] : Verbal consent for telehealth/telephonic services obtained from patient/other participant(s) [Patient] : Patient
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
## 2023-06-06 NOTE — PHYSICAL EXAM
[Well groomed] : well groomed [Average] : average [Cooperative] : cooperative [Euthymic] : euthymic [Full] : full [Clear] : clear [Linear/Goal Directed] : linear/goal directed [None] : none [None Reported] : none reported [Borderline] : borderline [WNL] : within normal limits [de-identified] : not internally preoccupied [de-identified] : d [de-identified] : adequate for safe care outside the hospital
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
## 2023-06-08 ENCOUNTER — APPOINTMENT (OUTPATIENT)
Dept: PSYCHIATRY | Facility: CLINIC | Age: 23
End: 2023-06-08
Payer: MEDICAID

## 2023-06-08 ENCOUNTER — APPOINTMENT (OUTPATIENT)
Dept: PSYCHIATRY | Facility: CLINIC | Age: 23
End: 2023-06-08

## 2023-06-08 PROCEDURE — 90832 PSYTX W PT 30 MINUTES: CPT | Mod: 95
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
## 2023-06-08 NOTE — PLAN
[Psychodynamic Therapy] : Psychodynamic Therapy  [Supportive Therapy] : Supportive Therapy [Recommended Frequency of Visits: ____] : Recommended frequency of visits: [unfilled] [FreeTextEntry2] : Improved mood, improved impulse control, improved relationships w/ family, coping w/ past traumas, reduced reliance on marijuana to regulate sleep and appetite.  [de-identified] : Ms. Correa spoke about feeling 'temperature rise in my body' and triggers for 'discharging anger' because it gets to be overwhelming at times. She is focussed on improved self-regulation while also recognizing how hard it is given her hx and current stressors. Engaged in grounding exercises and exploration of the range of factors contributing to her experience -- hunger, sleep, current air issues, worries. She was thoughtful, collaborative, engaged, and reported improvement in coping at the end of session.  [FreeTextEntry1] : Individual therapy, medication management. Reports medications have not helped after re-initiating them recently. \par  Will continue to explore tx needs and priorities\par Preventative services initiated -- but unable to access them.  Housing resources sought on an ongoing basis. \par David will be followed by Dr. Chevy Guzman is on leave.

## 2023-06-08 NOTE — REASON FOR VISIT
[Patient preference] : as per patient preference [Telephone (audio) - Individual/Group] : This telephonic visit was provided via audio only technology. [Technical or other issues] : Patient unable to effectively utilize tele-video due to technical or other issues. [Medical Office: ([facility])___] : The provider was located at the medical office in [unfilled]. [Home] : The patient, [unfilled], was located at home, [unfilled], at the time of the visit. [Verbal consent obtained from patient/other participant(s)] : Verbal consent for telehealth/telephonic services obtained from patient/other participant(s) [Patient] : Patient [FreeTextEntry4] : 1138am [FreeTextEntry5] : 12pm [FreeTextEntry2] : During inpatient  hospitalization [FreeTextEntry1] : "I am overwhelmed, and trying to be a mother"

## 2023-06-08 NOTE — REASON FOR VISIT
[Patient preference] : as per patient preference [Telephone (audio) - Individual/Group] : This telephonic visit was provided via audio only technology. [Technical or other issues] : Patient unable to effectively utilize tele-video due to technical or other issues. [Medical Office: ([facility])___] : The provider was located at the medical office in [unfilled]. [Home] : The patient, [unfilled], was located at home, [unfilled], at the time of the visit. [Verbal consent obtained from patient/other participant(s)] : Verbal consent for telehealth/telephonic services obtained from patient/other participant(s) [Patient] : Patient [FreeTextEntry4] : 11:03 [FreeTextEntry5] : 1127hf [FreeTextEntry2] : During inpatient  hospitalization [FreeTextEntry1] : "I am overwhelmed, and trying to be a mother"

## 2023-06-08 NOTE — END OF VISIT
[Duration of Psychotherapy Visit (minutes spent in synchronous communication): ____] : Duration of Psychotherapy Visit (minutes spent in synchronous communication): [unfilled] [Individual Psychotherapy for 16-37 minutes] : Individual Psychotherapy for 16-37 minutes [Teletherapy Service Provided] : The services provided in this session were delivered via tele-therapy [Licensed Clinician] : Licensed Clinician [FreeTextEntry3] : Home [FreeTextEntry5] : [facility]

## 2023-06-08 NOTE — PLAN
[Psychodynamic Therapy] : Psychodynamic Therapy  [Supportive Therapy] : Supportive Therapy [Recommended Frequency of Visits: ____] : Recommended frequency of visits: [unfilled] [FreeTextEntry2] : Improved mood, improved impulse control, improved relationships w/ family, coping w/ past traumas, reduced reliance on marijuana to regulate sleep and appetite.  [de-identified] : Ms. Correa was seen in her home, interacting attentively with her daughter.  She spoke about feeling stressed out about changes in psychiatric care and waiting to engage in medication that will be helpful, but also feeling skeptical that medication will be effective. She appreciated her meeting with the new psychiatrist, and able to speak openly to him. Discussed coping with persistent stress, and increase in drinking in the last week, at night.  She was thoughtful, collaborative and engaged. .  [FreeTextEntry1] : Individual therapy, medication management. Reports medications have not helped after re-initiating them recently. \par  Will continue to explore tx needs and priorities\par Preventative services initiated -- but unable to access them.  Housing resources sought on an ongoing basis. \par David will be followed by Dr. Chevy Guzman is on leave.

## 2023-06-08 NOTE — END OF VISIT
[Duration of Psychotherapy Visit (minutes spent in synchronous communication): ____] : Duration of Psychotherapy Visit (minutes spent in synchronous communication): [unfilled] [Individual Psychotherapy for 16-37 minutes] : Individual Psychotherapy for 16-37 minutes [Teletherapy Service Provided] : The services provided in this session were delivered via tele-therapy [Licensed Clinician] : Licensed Clinician [FreeTextEntry3] : Home [FreeTextEntry5] : [facility]

## 2023-06-12 ENCOUNTER — APPOINTMENT (OUTPATIENT)
Dept: PSYCHIATRY | Facility: CLINIC | Age: 23
End: 2023-06-12

## 2023-06-12 NOTE — BH CONSULTATION LIAISON ASSESSMENT NOTE - SUICIDE ATTEMPT:
-  - Lactate elevated to 4.5   - s/p IV abx and 2.5L LR  - Continue IV antibiotics - zithromax/ rocephin  - Start LR @ 100cc/hr  - Recheck lactate at 7pm   - Add probiotic BID  - Incentive spirometry  - Supplemental O2 PRN  - Monitor SPO2  - Pulmonary c/s   - Follow up blood cultures Yes > 3 months ago Protocol For Nb [facility]: The patient received [facility].

## 2023-06-13 ENCOUNTER — APPOINTMENT (OUTPATIENT)
Dept: PSYCHIATRY | Facility: CLINIC | Age: 23
End: 2023-06-13

## 2023-06-15 ENCOUNTER — APPOINTMENT (OUTPATIENT)
Dept: PSYCHIATRY | Facility: CLINIC | Age: 23
End: 2023-06-15

## 2023-06-20 ENCOUNTER — APPOINTMENT (OUTPATIENT)
Dept: PSYCHIATRY | Facility: CLINIC | Age: 23
End: 2023-06-20
Payer: MEDICAID

## 2023-06-20 PROCEDURE — 90832 PSYTX W PT 30 MINUTES: CPT | Mod: 95

## 2023-06-22 ENCOUNTER — APPOINTMENT (OUTPATIENT)
Dept: PSYCHIATRY | Facility: CLINIC | Age: 23
End: 2023-06-22

## 2023-06-22 NOTE — END OF VISIT
[Duration of Psychotherapy Visit (minutes spent in synchronous communication): ____] : Duration of Psychotherapy Visit (minutes spent in synchronous communication): [unfilled] [Individual Psychotherapy for 16-37 minutes] : Individual Psychotherapy for 16-37 minutes [Teletherapy Service Provided] : The services provided in this session were delivered via tele-therapy [FreeTextEntry3] : Home [FreeTextEntry5] : [facility]  [Licensed Clinician] : Licensed Clinician

## 2023-06-22 NOTE — PLAN
[FreeTextEntry2] : Improved mood, improved impulse control, improved relationships w/ family, coping w/ past traumas, reduced reliance on marijuana to regulate sleep and appetite.  [Psychodynamic Therapy] : Psychodynamic Therapy  [Supportive Therapy] : Supportive Therapy [de-identified] : Ms. Correa spoke about how her experience of motherhood is changing as her daughter grows up and changes. Discussed efforts/wishes to establish more financial and social independence and stability.  She reports decreased alcohol use this week, with no drinks for several days, and no intent to consume alcohl until she goes out socializing with her sister on the weekend.  She was thoughtful engaged and collaborative.  [Recommended Frequency of Visits: ____] : Recommended frequency of visits: [unfilled] [FreeTextEntry1] : Individual therapy, medication management. Reports she is taking medication consistently, but is unsure of the impact. \par  Will continue to explore tx needs and priorities\par Preventative services initiated -- but unable to access them.  Housing resources sought on an ongoing basis. \par David will be followed by Dr. Chevy Guzman is on leave.

## 2023-06-22 NOTE — REASON FOR VISIT
[Patient preference] : as per patient preference [Telephone (audio) - Individual/Group] : This telephonic visit was provided via audio only technology. [Technical or other issues] : Patient unable to effectively utilize tele-video due to technical or other issues. [Medical Office: ([facility])___] : The provider was located at the medical office in [unfilled]. [Home] : The patient, [unfilled], was located at home, [unfilled], at the time of the visit. [Verbal consent obtained from patient/other participant(s)] : Verbal consent for telehealth/telephonic services obtained from patient/other participant(s) [FreeTextEntry4] : 11:03 [FreeTextEntry5] : 1139am [FreeTextEntry2] : During inpatient  hospitalization [Patient] : Patient [FreeTextEntry1] : "I am overwhelmed, and trying to be a mother"

## 2023-06-27 ENCOUNTER — APPOINTMENT (OUTPATIENT)
Dept: PSYCHIATRY | Facility: CLINIC | Age: 23
End: 2023-06-27

## 2023-06-27 ENCOUNTER — NON-APPOINTMENT (OUTPATIENT)
Age: 23
End: 2023-06-27

## 2023-07-10 ENCOUNTER — NON-APPOINTMENT (OUTPATIENT)
Age: 23
End: 2023-07-10
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
## 2023-07-11 ENCOUNTER — APPOINTMENT (OUTPATIENT)
Dept: PSYCHIATRY | Facility: CLINIC | Age: 23
End: 2023-07-11

## 2023-07-13 ENCOUNTER — NON-APPOINTMENT (OUTPATIENT)
Age: 23
End: 2023-07-13

## 2023-07-13 NOTE — DISCUSSION/SUMMARY
[FreeTextEntry1] : Britney is being dc'd today from [facility] after a 4-5 day inpatient stay. \par Per David she plans to go to [facility] to initiate shelter placement with her daughter. \par This was confirmed by a VM left by inpatient social work, who said they would provide transportation home for her to get her daughter and go to the [facility] intake center. \par She has appointments confirmed with me on 07/18/23 and 07/20 and one with Dr. Zarate on 07/19. \pili Spoke to the inpatient sw and the inpatient resident, who discussed offering David various outside resources for d/c. \par funmilayo Spoke with David directly several times over the last couple of days from the inpatient unit. She states her mother gave her an ultimatum: Relinquish custody or you can't live at home.  She is hopeful that seeking her own housing with [place] will be a step in building a more stable life. She was thoughtful, engaged, insightful  and calm during her conversations. She reports tolerating her new medication regimen of Abilify and Zoloft well, and reports participating in inpatient activities such as group and collaborative d'c planning with the team. \par funmilayo Spoke with the treatment team a the [facility], and they won't be able to accommodate David in their in-person PHP because their nursery isn't set up for a toddler. Also working on [facility] referral, which doesn't currently require a preventative case.  Emailed with [facility] team who will reach out to David.  David declined [facility] referral as she doesn't think she will make it to [place] on a regular basis from a [facility] shelter placement. \par \pili [facility] inpatient team to send d'c summary once it's complete.

## 2023-07-18 ENCOUNTER — APPOINTMENT (OUTPATIENT)
Dept: PSYCHIATRY | Facility: CLINIC | Age: 23
End: 2023-07-18
Payer: MEDICAID

## 2023-07-18 PROCEDURE — 90832 PSYTX W PT 30 MINUTES: CPT | Mod: 95

## 2023-07-19 ENCOUNTER — APPOINTMENT (OUTPATIENT)
Age: 23
End: 2023-07-19
Payer: MEDICAID

## 2023-07-19 PROCEDURE — 99214 OFFICE O/P EST MOD 30 MIN: CPT | Mod: 95

## 2023-07-19 RX ORDER — ESCITALOPRAM OXALATE 20 MG/1
20 TABLET ORAL DAILY
Qty: 30 | Refills: 0 | Status: DISCONTINUED | COMMUNITY
Start: 2022-11-02 | End: 2023-07-19

## 2023-07-19 NOTE — PHYSICAL EXAM
[Well groomed] : well groomed [Average] : average [Cooperative] : cooperative [Euthymic] : euthymic [Full] : full [Clear] : clear [Linear/Goal Directed] : linear/goal directed [None] : none [None Reported] : none reported [Borderline] : borderline [WNL] : within normal limits [de-identified] : smiling, cheerful [de-identified] : not internally preoccupied
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
## 2023-07-19 NOTE — HISTORY OF PRESENT ILLNESS
[FreeTextEntry1] : 22 yo F, hx borderline PD, MDD, PTSD, SAs by OD, hosp admission in Dec, concerning Fhx SA and OD, currently in 2x/wk therapy and med management at [facility], has 16mo baby, presents for f/u. She had been admitted to [facility] inpt psychiatry earlier this month.\par \par DC summary reviewed. Pt was hosp [facility] 7/8/23-7/13/23, dx MDD and borderline PD, admitted for SI on VOL status, BIBS for emotional dysregulation I/s/o interpersonal conflict with her mother regarding pt's parenting, cannabis use.  Pt was discharged on Abilify 5mg daily and zoloft 50mg daily, to live at a shelter with her baby rather than home with family.\par \par Pt assessed today- she was cheerful, reports that she worked things out with family and is at home, with the roles of no cannabis use and needing to get a job.  She reports no use since discharge last week.  Mood stable, irritability and SI have resolved during inpt stay.  No current SI/HI/psychotic sx.  Pt happy with rx, had initial nausea with zoloft, tolerating well, no side effects to this or abilify, agrees we titrate zoloft, will notify if concerns or SE.  Pt in 2x/wk therapy with [facility], going well. [Suicidal Behavior/Ideation] : suicidal behavior/ideation
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
## 2023-07-20 ENCOUNTER — APPOINTMENT (OUTPATIENT)
Dept: PSYCHIATRY | Facility: CLINIC | Age: 23
End: 2023-07-20

## 2023-07-20 ENCOUNTER — NON-APPOINTMENT (OUTPATIENT)
Age: 23
End: 2023-07-20
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
## 2023-07-20 NOTE — PLAN
[FreeTextEntry2] : Improved mood, improved impulse control, improved relationships w/ family, coping w/ past traumas, reduced reliance on marijuana to regulate sleep and appetite.  [Psychodynamic Therapy] : Psychodynamic Therapy  [Supportive Therapy] : Supportive Therapy [de-identified] : Ms. Crorea spoke about events since her d/c from [place]. She returned home, and noted the impact of her separation on her daughter. she did not want to impose another stressful experience on her daughter after their weekslong separation, so she did not go to the [facility] intake that day, prefering to prioritize her daughter's emotional wellbeing. She is trying her best to adhere to new rules imposed by her mother and sister -- including no MJ use at all. She is finding this difficult and frustrating, noting that historically MJ has been a way to self-regulate in the evenings, and a reliable source of relief from intense thoughts/feelings, a reliable tool to promote sleep and a reliable appetite stimulant. She is hopeful med changes will support her overall wellbeing. She is determined to access all possible supports and has reached out to friend. She was thoughtful, engaged, and collaborative throughout.  [Recommended Frequency of Visits: ____] : Recommended frequency of visits: [unfilled] [FreeTextEntry1] : Individual therapy, medication management. Reports she is taking medication consistently, but is unsure of the impact. \par  Will continue to explore tx needs and priorities\par Preventative services initiated -- but unable to access them.  Housing resources sought on an ongoing basis. \par Re-referred to [facility].\par [facility] referral not possible due to financial constraints. \par David will be followed by Dr. Chevy Guzman is on leave.

## 2023-07-20 NOTE — REASON FOR VISIT
[Patient preference] : as per patient preference [Telephone (audio) - Individual/Group] : This telephonic visit was provided via audio only technology. [Technical or other issues] : Patient unable to effectively utilize tele-video due to technical or other issues. [Medical Office: ([facility])___] : The provider was located at the medical office in [unfilled]. [Home] : The patient, [unfilled], was located at home, [unfilled], at the time of the visit. [Verbal consent obtained from patient/other participant(s)] : Verbal consent for telehealth/telephonic services obtained from patient/other participant(s) [FreeTextEntry4] : 11:03 [FreeTextEntry5] : 1137am [FreeTextEntry2] : During inpatient hospitalization in january 2023 [Patient] : Patient [FreeTextEntry1] : "I am overwhelmed, and trying to be a mother"

## 2023-07-20 NOTE — END OF VISIT
[Duration of Psychotherapy Visit (minutes spent in synchronous communication): ____] : Duration of Psychotherapy Visit (minutes spent in synchronous communication): [unfilled] [Individual Psychotherapy for 16-37 minutes] : Individual Psychotherapy for 16-37 minutes [Teletherapy Service Provided] : The services provided in this session were delivered via tele-therapy [FreeTextEntry3] : Home [FreeTextEntry5] : [facility]  [Licensed Clinician] : Licensed Clinician

## 2023-07-24 ENCOUNTER — APPOINTMENT (OUTPATIENT)
Dept: INTERNAL MEDICINE | Facility: CLINIC | Age: 23
End: 2023-07-24

## 2023-07-25 ENCOUNTER — NON-APPOINTMENT (OUTPATIENT)
Age: 23
End: 2023-07-25

## 2023-07-25 ENCOUNTER — APPOINTMENT (OUTPATIENT)
Dept: PSYCHIATRY | Facility: CLINIC | Age: 23
End: 2023-07-25

## 2023-07-28 ENCOUNTER — NON-APPOINTMENT (OUTPATIENT)
Age: 23
End: 2023-07-28

## 2023-07-31 ENCOUNTER — NON-APPOINTMENT (OUTPATIENT)
Age: 23
End: 2023-07-31

## 2023-07-31 NOTE — DISCUSSION/SUMMARY
[FreeTextEntry1] : [place] from [facility] team that they've scheduled an intake with David on 08/16 at 2pm. Contact is Justo Trujillo, [phone].  Will make efforts to support David's ability to attend intake.

## 2023-08-01 ENCOUNTER — APPOINTMENT (OUTPATIENT)
Dept: PSYCHIATRY | Facility: CLINIC | Age: 23
End: 2023-08-01
Payer: MEDICAID

## 2023-08-01 PROCEDURE — 90832 PSYTX W PT 30 MINUTES: CPT | Mod: 95

## 2023-08-03 ENCOUNTER — APPOINTMENT (OUTPATIENT)
Dept: INTERNAL MEDICINE | Facility: CLINIC | Age: 23
End: 2023-08-03
Payer: MEDICAID

## 2023-08-03 ENCOUNTER — NON-APPOINTMENT (OUTPATIENT)
Age: 23
End: 2023-08-03

## 2023-08-03 VITALS
OXYGEN SATURATION: 99 % | HEART RATE: 77 BPM | HEIGHT: 69 IN | TEMPERATURE: 97.2 F | DIASTOLIC BLOOD PRESSURE: 80 MMHG | BODY MASS INDEX: 38.51 KG/M2 | WEIGHT: 260 LBS | SYSTOLIC BLOOD PRESSURE: 118 MMHG

## 2023-08-03 DIAGNOSIS — Z81.8 FAMILY HISTORY OF OTHER MENTAL AND BEHAVIORAL DISORDERS: ICD-10-CM

## 2023-08-03 DIAGNOSIS — F10.10 ALCOHOL ABUSE, UNCOMPLICATED: ICD-10-CM

## 2023-08-03 DIAGNOSIS — Z83.49 FAMILY HISTORY OF OTHER ENDOCRINE, NUTRITIONAL AND METABOLIC DISEASES: ICD-10-CM

## 2023-08-03 DIAGNOSIS — Z82.0 FAMILY HISTORY OF EPILEPSY AND OTHER DISEASES OF THE NERVOUS SYSTEM: ICD-10-CM

## 2023-08-03 DIAGNOSIS — Z00.00 ENCOUNTER FOR GENERAL ADULT MEDICAL EXAMINATION W/OUT ABNORMAL FINDINGS: ICD-10-CM

## 2023-08-03 DIAGNOSIS — E66.9 OBESITY, UNSPECIFIED: ICD-10-CM

## 2023-08-03 DIAGNOSIS — E28.2 POLYCYSTIC OVARIAN SYNDROME: ICD-10-CM

## 2023-08-03 PROCEDURE — 99395 PREV VISIT EST AGE 18-39: CPT | Mod: 25

## 2023-08-03 RX ORDER — HYDROCORTISONE 10 MG/G
1 CREAM TOPICAL TWICE DAILY
Qty: 1 | Refills: 2 | Status: DISCONTINUED | OUTPATIENT
Start: 2021-12-21 | End: 2023-08-03

## 2023-08-03 RX ORDER — METRONIDAZOLE 7.5 MG/G
0.75 GEL VAGINAL
Qty: 1 | Refills: 3 | Status: DISCONTINUED | COMMUNITY
Start: 2021-11-02 | End: 2023-08-03

## 2023-08-03 RX ORDER — HYDROCORTISONE 25 MG/G
2.5 CREAM TOPICAL 3 TIMES DAILY
Qty: 1 | Refills: 1 | Status: DISCONTINUED | COMMUNITY
Start: 2021-12-21 | End: 2023-08-03

## 2023-08-03 RX ORDER — HUMAN PAPILLOMAVIRUS 9-VALENT VACCINE, RECOMBINANT 30; 40; 60; 40; 20; 20; 20; 20; 20 UG/.5ML; UG/.5ML; UG/.5ML; UG/.5ML; UG/.5ML; UG/.5ML; UG/.5ML; UG/.5ML; UG/.5ML
INJECTION, SUSPENSION INTRAMUSCULAR
Qty: 1 | Refills: 3 | Status: ACTIVE | COMMUNITY
Start: 2023-08-03 | End: 1900-01-01

## 2023-08-03 RX ORDER — HYDROCORTISONE 10 MG/G
1 CREAM TOPICAL
Qty: 1 | Refills: 1 | Status: DISCONTINUED | COMMUNITY
Start: 2021-12-21 | End: 2023-08-03

## 2023-08-03 RX ORDER — IBUPROFEN 600 MG/1
600 TABLET, FILM COATED ORAL
Qty: 1 | Refills: 0 | Status: DISCONTINUED | COMMUNITY
Start: 2022-01-26 | End: 2023-08-03

## 2023-08-03 RX ORDER — CEPHALEXIN 500 MG/1
500 CAPSULE ORAL 4 TIMES DAILY
Qty: 20 | Refills: 0 | Status: DISCONTINUED | COMMUNITY
Start: 2022-04-04 | End: 2023-08-03

## 2023-08-03 RX ORDER — VALACYCLOVIR 1 G/1
1 TABLET, FILM COATED ORAL DAILY
Qty: 30 | Refills: 1 | Status: DISCONTINUED | COMMUNITY
Start: 2021-12-14 | End: 2023-08-03

## 2023-08-03 RX ORDER — ACETAMINOPHEN 325 MG/1
325 TABLET ORAL EVERY 6 HOURS
Qty: 1 | Refills: 0 | Status: DISCONTINUED | COMMUNITY
Start: 2022-01-26 | End: 2023-08-03

## 2023-08-03 NOTE — PAST MEDICAL HISTORY
[Menstruating] : menstruating [Approximately ___] : the LMP was approximately [unfilled] [Less Bleeding] : the period was lighter than normal [Normal Duration] : the duration was normal [Irregular Cycle Intervals] : are  irregular [Dysmenorrhea] : dysmenorrhea [Total Preg ___] : G[unfilled] [Live Births ___] : P[unfilled]  [Full Term ___] : Full Term: [unfilled] [Living ___] : Living: [unfilled] [AB Spont ___] : miscarriages: [unfilled]
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
## 2023-08-03 NOTE — PLAN
[FreeTextEntry2] : Improved mood, improved impulse control, improved relationships w/ family, coping w/ past traumas, reduced reliance on marijuana to regulate sleep and appetite.  [Psychodynamic Therapy] : Psychodynamic Therapy  [Supportive Therapy] : Supportive Therapy [de-identified] : Ms. Correa reviewed decision to sleep away from home, to reduce overall family tension and conflict. She misses her daughter and she also wants to keep things calmer.  She reports feeling overall more in control of her behaviors -- reports reduced etoh and mj use -- but is also sad about separation from daughter at night, and more in-touch with maternal wishes to provide a loving/safe/calm home. Exploreing housing options and following up with case workers. She confirmed wish to attend [facility] program intake.  [Recommended Frequency of Visits: ____] : Recommended frequency of visits: [unfilled] [FreeTextEntry1] : Individual therapy, medication management. Reports she is taking medication consistently, but is unsure of the impact.   Will continue to explore tx needs and priorities Preventative services initiated -- but unable to access them.  Housing resources sought on an ongoing basis.  Re-referred to [facility] -- intake confirmed for mid-august.  [facility] referral not possible due to financial constraints.  David will be followed by Dr. Chevy Guzman is on leave.

## 2023-08-03 NOTE — PHYSICAL EXAM
[No Acute Distress] : no acute distress [Well-Appearing] : well-appearing [Normal Sclera/Conjunctiva] : normal sclera/conjunctiva [Normal Oropharynx] : the oropharynx was normal [No Lymphadenopathy] : no lymphadenopathy [Supple] : supple [No Respiratory Distress] : no respiratory distress  [Clear to Auscultation] : lungs were clear to auscultation bilaterally [Normal Rate] : normal rate  [Regular Rhythm] : with a regular rhythm [No Edema] : there was no peripheral edema [Soft] : abdomen soft [Non Tender] : non-tender [Obese] : obese [No Focal Deficits] : no focal deficits

## 2023-08-03 NOTE — END OF VISIT
[Duration of Psychotherapy Visit (minutes spent in synchronous communication): ____] : Duration of Psychotherapy Visit (minutes spent in synchronous communication): [unfilled] [Individual Psychotherapy for 16-37 minutes] : Individual Psychotherapy for 16-37 minutes [Teletherapy Service Provided] : The services provided in this session were delivered via tele-therapy [FreeTextEntry3] : Home [FreeTextEntry5] : [facility]  [Licensed Clinician] : Licensed Clinician

## 2023-08-03 NOTE — REASON FOR VISIT
[Patient preference] : as per patient preference [Telephone (audio) - Individual/Group] : This telephonic visit was provided via audio only technology. [This encounter was initiated by telehealth (audio with video) and converted to telephone (audio only) due to technical difficulties.] : This encounter was initiated by telehealth (audio with video) and converted to telephone (audio only) due to technical difficulties. [Medical Office: ([facility])___] : The provider was located at the medical office in [unfilled]. [Other Location: e.g. Home (Enter Location, City,State)___] : The patient, [unfilled], was located at [unfilled] at the time of the visit. [Verbal consent obtained from patient/other participant(s)] : Verbal consent for telehealth/telephonic services obtained from patient/other participant(s) [FreeTextEntry4] : 11:03 [FreeTextEntry5] : 1138am [FreeTextEntry2] : During inpatient hospitalization in january 2023 [Patient] : Patient [FreeTextEntry1] : "I am overwhelmed, and trying to be a mother"

## 2023-08-04 LAB
ALBUMIN SERPL ELPH-MCNC: 4.8 G/DL
ALP BLD-CCNC: 82 U/L
ALT SERPL-CCNC: 26 U/L
ANION GAP SERPL CALC-SCNC: 13 MMOL/L
AST SERPL-CCNC: 15 U/L
BILIRUB SERPL-MCNC: 0.2 MG/DL
BUN SERPL-MCNC: 10 MG/DL
CALCIUM SERPL-MCNC: 10 MG/DL
CHLORIDE SERPL-SCNC: 104 MMOL/L
CHOLEST SERPL-MCNC: 176 MG/DL
CO2 SERPL-SCNC: 24 MMOL/L
CREAT SERPL-MCNC: 0.79 MG/DL
EGFR: 108 ML/MIN/1.73M2
ESTIMATED AVERAGE GLUCOSE: 114 MG/DL
GLUCOSE SERPL-MCNC: 88 MG/DL
HBA1C MFR BLD HPLC: 5.6 %
HBV CORE IGG+IGM SER QL: NONREACTIVE
HBV SURFACE AB SER QL: NONREACTIVE
HBV SURFACE AG SER QL: NONREACTIVE
HCV AB SER QL: NONREACTIVE
HCV S/CO RATIO: 0.14 S/CO
HDLC SERPL-MCNC: 33 MG/DL
LDLC SERPL CALC-MCNC: 91 MG/DL
NONHDLC SERPL-MCNC: 143 MG/DL
POTASSIUM SERPL-SCNC: 4.2 MMOL/L
PROT SERPL-MCNC: 7.7 G/DL
SODIUM SERPL-SCNC: 141 MMOL/L
TRIGL SERPL-MCNC: 312 MG/DL
TSH SERPL-ACNC: 2.63 UIU/ML

## 2023-08-04 NOTE — REVIEW OF SYSTEMS
[Fever] : no fever [Chills] : no chills [Vision Problems] : no vision problems [Chest Pain] : no chest pain [Lower Ext Edema] : no lower extremity edema [Shortness Of Breath] : no shortness of breath [Dyspnea on Exertion] : no dyspnea on exertion [Abdominal Pain] : no abdominal pain [Nausea] : no nausea [Constipation] : no constipation [Vomiting] : no vomiting [Dysuria] : no dysuria [Joint Pain] : no joint pain [Muscle Pain] : no muscle pain [Skin Rash] : no skin rash [Headache] : no headache [Suicidal] : not suicidal [Anxiety] : no anxiety [Depression] : no depression

## 2023-08-04 NOTE — PLAN
[FreeTextEntry1] : #MDD #PTSD Recently admitted to inpt psych 7/8-13 at [facility] for SI. Discharged with Zoloft 75 and Abilify 5 qd. Compliant. Tolerating well. Sees talk therapist 2x/week. Denies any thoughts of self harm or harm to infant child. Currently using MJ 3x/week.  - c/w Zoloft 75 qd and Abilify 5 qd - c/w talk therapist 2x/week  #obesity  Gained ~40 lbs since April 23'. Reports elevated glucose during pregnancy but not dx with gestational DM. FMHx DM in father who passed away 2014 d/t DM complications. FMHx Hashimoto in mother, sister. Denies any personal h/o autoimmune disease.  - f/u A1c, TSH, lipid panel, CBC, CMP  #HCM - requesting HPV vax - sent to pharmacy  - PAP 2021 reportedly normal - due in 2024 - f/u [facility] (requesting for shelter placement) - f/u HCV

## 2023-08-04 NOTE — ASSESSMENT
[FreeTextEntry1] : 23F PMHx PCOS, scoliosis, borderline PD, MDD, PTSD, SA, with recent inpt psych hospitalization 7/8-7/13 for SI at [facility], presenting today for CPE.

## 2023-08-04 NOTE — HEALTH RISK ASSESSMENT
[No] : No [0] : 2) Feeling down, depressed, or hopeless: Not at all (0) [Patient reported PAP Smear was normal] : Patient reported PAP Smear was normal [Employed] : employed [Single] : single [# Of Children ___] : has [unfilled] children [Sexually Active] : sexually active [Former] : Former [PapSmearDate] : 2021

## 2023-08-04 NOTE — END OF VISIT
[] : Resident [FreeTextEntry3] : Patient seen with Dr. Hoskins.  Agree with assessment and plan as noted.
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
## 2023-08-07 LAB
M TB IFN-G BLD-IMP: NEGATIVE
QUANTIFERON TB PLUS MITOGEN MINUS NIL: 9.61 IU/ML
QUANTIFERON TB PLUS NIL: 0.04 IU/ML
QUANTIFERON TB PLUS TB1 MINUS NIL: 0.01 IU/ML
QUANTIFERON TB PLUS TB2 MINUS NIL: 0.02 IU/ML

## 2023-08-08 ENCOUNTER — APPOINTMENT (OUTPATIENT)
Dept: PSYCHIATRY | Facility: CLINIC | Age: 23
End: 2023-08-08
Payer: MEDICAID

## 2023-08-08 PROCEDURE — 90832 PSYTX W PT 30 MINUTES: CPT | Mod: 95

## 2023-08-15 ENCOUNTER — APPOINTMENT (OUTPATIENT)
Dept: PSYCHIATRY | Facility: CLINIC | Age: 23
End: 2023-08-15
Payer: MEDICAID

## 2023-08-15 PROCEDURE — 90832 PSYTX W PT 30 MINUTES: CPT | Mod: 95

## 2023-08-16 ENCOUNTER — APPOINTMENT (OUTPATIENT)
Age: 23
End: 2023-08-16
Payer: MEDICAID

## 2023-08-16 PROCEDURE — 99214 OFFICE O/P EST MOD 30 MIN: CPT | Mod: 95

## 2023-08-16 NOTE — REASON FOR VISIT
[Patient preference] : as per patient preference [Telephone (audio) - Individual/Group] : This telephonic visit was provided via audio only technology. [This encounter was initiated by telehealth (audio with video) and converted to telephone (audio only) due to technical difficulties.] : This encounter was initiated by telehealth (audio with video) and converted to telephone (audio only) due to technical difficulties. [Medical Office: ([facility])___] : The provider was located at the medical office in [unfilled]. [Other Location: e.g. Home (Enter Location, City,State)___] : The patient, [unfilled], was located at [unfilled] at the time of the visit. [Verbal consent obtained from patient/other participant(s)] : Verbal consent for telehealth/telephonic services obtained from patient/other participant(s) [FreeTextEntry4] : 11:00 [FreeTextEntry5] : 1139am [FreeTextEntry2] : During inpatient hospitalization in january 2023 [Patient] : Patient [FreeTextEntry1] : "I am overwhelmed, and trying to be a mother"

## 2023-08-16 NOTE — END OF VISIT
[Duration of Psychotherapy Visit (minutes spent in synchronous communication): ____] : Duration of Psychotherapy Visit (minutes spent in synchronous communication): [unfilled] [Individual Psychotherapy for 16-37 minutes] : Individual Psychotherapy for 16-37 minutes [Teletherapy Service Provided] : The services provided in this session were delivered via tele-therapy [FreeTextEntry3] : Home [FreeTextEntry5] : [facility]  [Licensed Clinician] : Licensed Clinician

## 2023-08-16 NOTE — REASON FOR VISIT
[Patient preference] : as per patient preference [Telehealth (audio & video) - Individual/Group] : This visit was provided via telehealth using real-time 2-way audio visual technology. [Other Location: e.g. Home (Enter Location, City,State)___] : The patient, [unfilled], was located at [unfilled] at the time of the visit. [Verbal consent obtained from patient/other participant(s)] : Verbal consent for telehealth/telephonic services obtained from patient/other participant(s) [Patient] : Patient
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
## 2023-08-16 NOTE — PHYSICAL EXAM
[Well groomed] : well groomed [Average] : average [Cooperative] : cooperative [Euthymic] : euthymic [Full] : full [Clear] : clear [Linear/Goal Directed] : linear/goal directed [None] : none [None Reported] : none reported [Borderline] : borderline [WNL] : within normal limits [de-identified] : not internally preoccupied

## 2023-08-16 NOTE — HISTORY OF PRESENT ILLNESS
[FreeTextEntry1] : Covering for Dr Guzman  22 yo F, hx borderline PD, MDD, PTSD, SAs by OD, hosp admission in Dec, concerning Fhx SA and OD, currently [facility], has 17mo baby, presents for f/u. She had been admitted to [facility] in psychiatry earlier this month.  Pt reports losing her prescriptions, needed brief refill last week, describes adherence with abilify 5mg daily and zoloft 75mg daily, reports "same old same old", denies any suicidal thoughts or HI in interim.  Pt reports living with her friend; daughter lives with pt's mother, situation is "okay" for now.  Pt reports WNL sleep.  She smokes cannabis a few times per week, has stopped using alcohol.  No side effects to medications.  Therapy is going well though pt could not identify what she is currently working on.  Discussed regimen and titration of zoloft, she agrees.  Pt was at park (said it was private area and wanted to continue) - visit was brief due to distractions, reinforced need for distraction-free area next time.
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
## 2023-08-16 NOTE — RISK ASSESSMENT
[No, patient denies ideation or behavior] : No, patient denies ideation or behavior [FreeTextEntry8] : No interim SI, pt help-seeking, reduced substance use, acute risk low at baseline at this time, pt engaged in 2x/wk therapy [Low acute suicide risk] : Low acute suicide risk [Yes] : Yes

## 2023-08-16 NOTE — PLAN
No [FreeTextEntry2] : Improved mood, improved impulse control, improved relationships w/ family, coping w/ past traumas, reduced reliance on marijuana to regulate sleep and appetite.  [Psychodynamic Therapy] : Psychodynamic Therapy  [Supportive Therapy] : Supportive Therapy [de-identified] : Ms. Correa was seen in her home, interacting with her daughter. Discussed efforts to get housing information and options in order, and frustration regarding mixed messages from outreach workers and difficulty accessing forms because of computer/internet issues. She was thoughtful, collaborative and insightful. She expressed her intention to attend her appointments tomorrow -- including psychiatry and [facility] intake.  [Recommended Frequency of Visits: ____] : Recommended frequency of visits: [unfilled] [FreeTextEntry1] : Individual therapy, medication management. Reports she is taking medication consistently, but is unsure of the impact.   Will continue to explore tx needs and priorities Preventative services initiated -- but unable to access them.  Housing resources sought on an ongoing basis.  Re-referred to [facility] -- intake confirmed for mid-august.  [facility] referral not possible due to financial constraints.  David will be followed by Dr. Chevy Guzman is on leave.

## 2023-08-17 NOTE — END OF VISIT
[Duration of Psychotherapy Visit (minutes spent in synchronous communication): ____] : Duration of Psychotherapy Visit (minutes spent in synchronous communication): [unfilled] [Individual Psychotherapy for 16-37 minutes] : Individual Psychotherapy for 16-37 minutes [Teletherapy Service Provided] : The services provided in this session were delivered via tele-therapy [Licensed Clinician] : Licensed Clinician [FreeTextEntry3] : Home [FreeTextEntry5] : [facility]

## 2023-08-17 NOTE — PLAN
[Psychodynamic Therapy] : Psychodynamic Therapy  [Supportive Therapy] : Supportive Therapy [Recommended Frequency of Visits: ____] : Recommended frequency of visits: [unfilled] [FreeTextEntry2] : Improved mood, improved impulse control, improved relationships w/ family, coping w/ past traumas, reduced reliance on marijuana to regulate sleep and appetite.  [de-identified] : Ms. Correa was seen at home, with her daughter. She's been spending nights at a friend's and going home during the day, which seems to be reducing family conflict even though it is hard for her to be away from her daughter in this kind of a routine way. Discussed efforts to prioritize her daughter's wellbeing, and how confusing this can be at times. Reviewed efforts to explore housing options. She was thoughtful, engaged, collaboraitve.  [FreeTextEntry1] : Individual therapy, medication management. Reports she is taking medication consistently, but is unsure of the impact.   Will continue to explore tx needs and priorities Preventative services initiated -- but unable to access them.  Housing resources sought on an ongoing basis.  Re-referred to [facility] -- intake confirmed for mid-august.  [facility] referral not possible due to financial constraints.  David will be followed by Dr. Chevy Guzman is on leave.

## 2023-08-29 ENCOUNTER — APPOINTMENT (OUTPATIENT)
Dept: INTERNAL MEDICINE | Facility: CLINIC | Age: 23
End: 2023-08-29

## 2023-08-29 ENCOUNTER — NON-APPOINTMENT (OUTPATIENT)
Age: 23
End: 2023-08-29

## 2023-08-29 ENCOUNTER — APPOINTMENT (OUTPATIENT)
Dept: PSYCHIATRY | Facility: CLINIC | Age: 23
End: 2023-08-29

## 2023-09-01 ENCOUNTER — OUTPATIENT (OUTPATIENT)
Dept: OUTPATIENT SERVICES | Facility: HOSPITAL | Age: 23
LOS: 1 days | Discharge: HOME | End: 2023-09-01

## 2023-09-05 ENCOUNTER — APPOINTMENT (OUTPATIENT)
Dept: PSYCHIATRY | Facility: CLINIC | Age: 23
End: 2023-09-05

## 2023-09-05 ENCOUNTER — NON-APPOINTMENT (OUTPATIENT)
Age: 23
End: 2023-09-05

## 2023-09-07 ENCOUNTER — APPOINTMENT (OUTPATIENT)
Dept: PSYCHIATRY | Facility: CLINIC | Age: 23
End: 2023-09-07
Payer: MEDICAID

## 2023-09-07 ENCOUNTER — NON-APPOINTMENT (OUTPATIENT)
Age: 23
End: 2023-09-07

## 2023-09-07 PROCEDURE — 90832 PSYTX W PT 30 MINUTES: CPT | Mod: 95

## 2023-09-12 ENCOUNTER — APPOINTMENT (OUTPATIENT)
Dept: PSYCHIATRY | Facility: CLINIC | Age: 23
End: 2023-09-12
Payer: MEDICAID

## 2023-09-12 ENCOUNTER — NON-APPOINTMENT (OUTPATIENT)
Age: 23
End: 2023-09-12

## 2023-09-12 PROCEDURE — 90832 PSYTX W PT 30 MINUTES: CPT | Mod: 95

## 2023-09-13 ENCOUNTER — APPOINTMENT (OUTPATIENT)
Age: 23
End: 2023-09-13

## 2023-09-19 ENCOUNTER — APPOINTMENT (OUTPATIENT)
Dept: PSYCHIATRY | Facility: CLINIC | Age: 23
End: 2023-09-19

## 2023-09-26 ENCOUNTER — APPOINTMENT (OUTPATIENT)
Dept: PSYCHIATRY | Facility: CLINIC | Age: 23
End: 2023-09-26
Payer: MEDICAID

## 2023-09-26 PROCEDURE — 90832 PSYTX W PT 30 MINUTES: CPT | Mod: 95

## 2023-10-03 ENCOUNTER — APPOINTMENT (OUTPATIENT)
Dept: PSYCHIATRY | Facility: CLINIC | Age: 23
End: 2023-10-03

## 2023-10-10 ENCOUNTER — APPOINTMENT (OUTPATIENT)
Dept: PSYCHIATRY | Facility: CLINIC | Age: 23
End: 2023-10-10

## 2023-10-10 ENCOUNTER — NON-APPOINTMENT (OUTPATIENT)
Age: 23
End: 2023-10-10

## 2023-10-10 DIAGNOSIS — F34.1 DYSTHYMIC DISORDER: ICD-10-CM

## 2023-10-17 ENCOUNTER — NON-APPOINTMENT (OUTPATIENT)
Age: 23
End: 2023-10-17

## 2023-10-17 ENCOUNTER — APPOINTMENT (OUTPATIENT)
Dept: PSYCHIATRY | Facility: CLINIC | Age: 23
End: 2023-10-17

## 2023-10-24 ENCOUNTER — APPOINTMENT (OUTPATIENT)
Dept: PSYCHIATRY | Facility: CLINIC | Age: 23
End: 2023-10-24
Payer: MEDICAID

## 2023-10-24 PROCEDURE — 90853 GROUP PSYCHOTHERAPY: CPT | Mod: 95

## 2023-10-26 ENCOUNTER — APPOINTMENT (OUTPATIENT)
Age: 23
End: 2023-10-26
Payer: MEDICAID

## 2023-10-26 DIAGNOSIS — Z91.51 PERSONAL HISTORY OF SUICIDAL BEHAVIOR: ICD-10-CM

## 2023-10-26 PROCEDURE — 99214 OFFICE O/P EST MOD 30 MIN: CPT | Mod: 95

## 2023-10-26 RX ORDER — SERTRALINE HYDROCHLORIDE 100 MG/1
100 TABLET, FILM COATED ORAL DAILY
Qty: 30 | Refills: 0 | Status: DISCONTINUED | COMMUNITY
Start: 2023-07-19 | End: 2023-10-26

## 2023-10-26 RX ORDER — ARIPIPRAZOLE 5 MG/1
5 TABLET ORAL DAILY
Qty: 30 | Refills: 0 | Status: DISCONTINUED | COMMUNITY
Start: 2023-07-19 | End: 2023-10-26

## 2023-10-31 ENCOUNTER — NON-APPOINTMENT (OUTPATIENT)
Age: 23
End: 2023-10-31

## 2023-10-31 ENCOUNTER — APPOINTMENT (OUTPATIENT)
Dept: PSYCHIATRY | Facility: CLINIC | Age: 23
End: 2023-10-31

## 2023-11-05 ENCOUNTER — NON-APPOINTMENT (OUTPATIENT)
Age: 23
End: 2023-11-05

## 2023-11-07 ENCOUNTER — APPOINTMENT (OUTPATIENT)
Dept: PSYCHIATRY | Facility: CLINIC | Age: 23
End: 2023-11-07

## 2023-11-12 ENCOUNTER — NON-APPOINTMENT (OUTPATIENT)
Age: 23
End: 2023-11-12

## 2023-11-14 ENCOUNTER — APPOINTMENT (OUTPATIENT)
Dept: PSYCHIATRY | Facility: CLINIC | Age: 23
End: 2023-11-14
Payer: MEDICAID

## 2023-11-14 PROCEDURE — 90832 PSYTX W PT 30 MINUTES: CPT | Mod: 95

## 2023-11-16 ENCOUNTER — APPOINTMENT (OUTPATIENT)
Age: 23
End: 2023-11-16

## 2023-11-21 ENCOUNTER — APPOINTMENT (OUTPATIENT)
Dept: PSYCHIATRY | Facility: CLINIC | Age: 23
End: 2023-11-21

## 2023-11-21 ENCOUNTER — NON-APPOINTMENT (OUTPATIENT)
Age: 23
End: 2023-11-21

## 2023-11-28 ENCOUNTER — APPOINTMENT (OUTPATIENT)
Dept: PSYCHIATRY | Facility: CLINIC | Age: 23
End: 2023-11-28

## 2023-11-29 ENCOUNTER — APPOINTMENT (OUTPATIENT)
Dept: PSYCHIATRY | Facility: CLINIC | Age: 23
End: 2023-11-29
Payer: MEDICAID

## 2023-11-29 PROCEDURE — 90832 PSYTX W PT 30 MINUTES: CPT | Mod: 95

## 2023-12-05 ENCOUNTER — APPOINTMENT (OUTPATIENT)
Dept: PSYCHIATRY | Facility: CLINIC | Age: 23
End: 2023-12-05

## 2023-12-05 ENCOUNTER — NON-APPOINTMENT (OUTPATIENT)
Age: 23
End: 2023-12-05

## 2023-12-09 NOTE — RISK ASSESSMENT
[No, patient denies ideation or behavior] : No, patient denies ideation or behavior [FreeTextEntry9] : low acute risk of harm to self/others. Standing/Walking

## 2023-12-12 ENCOUNTER — APPOINTMENT (OUTPATIENT)
Dept: PSYCHIATRY | Facility: CLINIC | Age: 23
End: 2023-12-12
Payer: MEDICAID

## 2023-12-12 PROCEDURE — 90832 PSYTX W PT 30 MINUTES: CPT | Mod: 95

## 2023-12-13 ENCOUNTER — APPOINTMENT (OUTPATIENT)
Age: 23
End: 2023-12-13
Payer: MEDICAID

## 2023-12-13 DIAGNOSIS — Z86.59 PERSONAL HISTORY OF OTHER MENTAL AND BEHAVIORAL DISORDERS: ICD-10-CM

## 2023-12-13 PROCEDURE — 99213 OFFICE O/P EST LOW 20 MIN: CPT | Mod: 95

## 2023-12-13 NOTE — PHYSICAL EXAM
[Cooperative] : cooperative [Anxious] : anxious [Full] : full [Clear] : clear [Linear/Goal Directed] : linear/goal directed [Average] : average [WNL] : within normal limits [Mild] : mild

## 2023-12-13 NOTE — RISK ASSESSMENT
[No, patient denies ideation or behavior] : No, patient denies ideation or behavior [FreeTextEntry8] : chronic risk elevated as previously described [Low acute suicide risk] : Low acute suicide risk [Yes] : Yes
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
## 2023-12-13 NOTE — REASON FOR VISIT
[Patient preference] : as per patient preference [Telehealth (audio & video) - Individual/Group] : This visit was provided via telehealth using real-time 2-way audio visual technology. [Medical Office: ([facility])___] : The provider was located at the medical office in [unfilled]. [Home] : The patient, [unfilled], was located at home, [unfilled], at the time of the visit. [Verbal consent obtained from patient/other participant(s)] : Verbal consent for telehealth/telephonic services obtained from patient/other participant(s) [FreeTextEntry4] : 1pm [FreeTextEntry5] : 130pm [FreeTextEntry2] : On 8URIS durin ginpatient hospitalization [Patient] : Patient [FreeTextEntry1] : "I want to feel better about myself."

## 2023-12-13 NOTE — PLAN
[FreeTextEntry2] : Increased sense of self-efficacy; increased sense of agency.  [Psychodynamic Therapy] : Psychodynamic Therapy  [Supportive Therapy] : Supportive Therapy [de-identified] : Pt seen in her home, interacting with her daughter. She was thoughtful, collaborative, and demonstrated good insight and judgment. Expressed frustration for how her efforts to pursue stable housing, employment and finances are undermined by family and by systems. She is trying to focus on long-term priorities and not feel too demoralized by short-term barriers but at times "I feel depressed and hurt and angry."  she was receptive to feedback and support. is intent on adhering to medications. Reports minimal MJ and EtOH in recent weeks.  [Recommended Frequency of Visits: ____] : Recommended frequency of visits: [unfilled] [FreeTextEntry1] : Weekly individual therapy. monthly medication management. Not interested in group interventions right now, but following up with other supports related to housing, finances, and occupational development, offered by her .

## 2023-12-13 NOTE — PHYSICAL EXAM
[Well groomed] : well groomed [Average] : average [Cooperative] : cooperative [Euthymic] : euthymic [Full] : full [Clear] : clear [Linear/Goal Directed] : linear/goal directed [None] : none [None Reported] : none reported [Borderline] : borderline [Mostly blames others for problems] : Mostly blames others for problems [WNL] : within normal limits [FreeTextEntry1] : attention to detail [de-identified] : not internally preoccupied [de-identified] : adequate for outpt care
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
## 2023-12-13 NOTE — END OF VISIT
[Duration of Psychotherapy Visit (minutes spent in synchronous communication): ____] : Duration of Psychotherapy Visit (minutes spent in synchronous communication): [unfilled] [Individual Psychotherapy for 16-37 minutes] : Individual Psychotherapy for 16-37 minutes [Teletherapy Service Provided] : The services provided in this session were delivered via tele-therapy [FreeTextEntry3] : home [FreeTextEntry5] : [facility] -- [address] [place] [address].  [Licensed Clinician] : Licensed Clinician

## 2023-12-19 ENCOUNTER — APPOINTMENT (OUTPATIENT)
Dept: PSYCHIATRY | Facility: CLINIC | Age: 23
End: 2023-12-19

## 2023-12-19 ENCOUNTER — NON-APPOINTMENT (OUTPATIENT)
Age: 23
End: 2023-12-19

## 2023-12-26 ENCOUNTER — APPOINTMENT (OUTPATIENT)
Dept: PSYCHIATRY | Facility: CLINIC | Age: 23
End: 2023-12-26

## 2024-01-02 ENCOUNTER — NON-APPOINTMENT (OUTPATIENT)
Age: 24
End: 2024-01-02

## 2024-01-02 ENCOUNTER — APPOINTMENT (OUTPATIENT)
Dept: PSYCHIATRY | Facility: CLINIC | Age: 24
End: 2024-01-02
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
## 2024-01-09 ENCOUNTER — APPOINTMENT (OUTPATIENT)
Dept: PSYCHIATRY | Facility: CLINIC | Age: 24
End: 2024-01-09
Payer: MEDICAID

## 2024-01-09 PROCEDURE — 90832 PSYTX W PT 30 MINUTES: CPT | Mod: 95

## 2024-01-10 ENCOUNTER — APPOINTMENT (OUTPATIENT)
Age: 24
End: 2024-01-10
Payer: MEDICAID

## 2024-01-10 PROCEDURE — ZZZZZ: CPT

## 2024-01-10 NOTE — PHYSICAL EXAM
[Cooperative] : cooperative [Euthymic] : euthymic [Full] : full [Clear] : clear [Linear/Goal Directed] : linear/goal directed [None] : none [None Reported] : none reported [Borderline] : borderline [Mostly blames others for problems] : Mostly blames others for problems [WNL] : within normal limits [de-identified] : adequate for outpt care
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
## 2024-01-10 NOTE — RISK ASSESSMENT
[No, patient denies ideation or behavior] : No, patient denies ideation or behavior [FreeTextEntry8] : acute suicide risk low ej per pt's protective factor of daughter, insight into SI, managing this in therapy, ability to seek increased care (eg hosp) when needed, and engagement in treatment.  Chronic passive SI is no unusual for her, continue to monitor. [Low acute suicide risk] : Low acute suicide risk [Yes] : Yes
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
## 2024-01-10 NOTE — REASON FOR VISIT
[Patient preference] : as per patient preference [Telephone (audio) - Individual/Group] : This telephonic visit was provided via audio only technology. [Technical or other issues] : Patient unable to effectively utilize tele-video due to technical or other issues. [Other Location: e.g. Home (Enter Location, City,State)___] : The provider was located at [unfilled]. [Home] : The patient, [unfilled], was located at home, [unfilled], at the time of the visit. [Verbal consent obtained from patient/other participant(s)] : Verbal consent for telehealth/telephonic services obtained from patient/other participant(s) [Patient] : Patient

## 2024-01-16 ENCOUNTER — APPOINTMENT (OUTPATIENT)
Dept: PSYCHIATRY | Facility: CLINIC | Age: 24
End: 2024-01-16
Payer: MEDICAID

## 2024-01-16 PROCEDURE — 90832 PSYTX W PT 30 MINUTES: CPT | Mod: 95

## 2024-01-23 ENCOUNTER — APPOINTMENT (OUTPATIENT)
Dept: PSYCHIATRY | Facility: CLINIC | Age: 24
End: 2024-01-23

## 2024-01-27 ENCOUNTER — EMERGENCY (EMERGENCY)
Facility: HOSPITAL | Age: 24
LOS: 1 days | Discharge: HOME | End: 2024-01-27
Attending: EMERGENCY MEDICINE | Admitting: EMERGENCY MEDICINE
Payer: COMMERCIAL

## 2024-01-27 VITALS
TEMPERATURE: 98 F | HEART RATE: 92 BPM | RESPIRATION RATE: 16 BRPM | SYSTOLIC BLOOD PRESSURE: 111 MMHG | DIASTOLIC BLOOD PRESSURE: 78 MMHG | OXYGEN SATURATION: 96 %

## 2024-01-27 VITALS
WEIGHT: 259.93 LBS | HEART RATE: 134 BPM | HEIGHT: 69 IN | RESPIRATION RATE: 20 BRPM | TEMPERATURE: 99 F | DIASTOLIC BLOOD PRESSURE: 78 MMHG | OXYGEN SATURATION: 98 % | SYSTOLIC BLOOD PRESSURE: 130 MMHG

## 2024-01-27 DIAGNOSIS — J11.1 INFLUENZA DUE TO UNIDENTIFIED INFLUENZA VIRUS WITH OTHER RESPIRATORY MANIFESTATIONS: ICD-10-CM

## 2024-01-27 DIAGNOSIS — M41.9 SCOLIOSIS, UNSPECIFIED: ICD-10-CM

## 2024-01-27 DIAGNOSIS — F32.A DEPRESSION, UNSPECIFIED: ICD-10-CM

## 2024-01-27 DIAGNOSIS — R00.0 TACHYCARDIA, UNSPECIFIED: ICD-10-CM

## 2024-01-27 DIAGNOSIS — R05.1 ACUTE COUGH: ICD-10-CM

## 2024-01-27 DIAGNOSIS — Z20.822 CONTACT WITH AND (SUSPECTED) EXPOSURE TO COVID-19: ICD-10-CM

## 2024-01-27 LAB
ALBUMIN SERPL ELPH-MCNC: 4.6 G/DL — SIGNIFICANT CHANGE UP (ref 3.3–5)
ALP SERPL-CCNC: 73 U/L — SIGNIFICANT CHANGE UP (ref 40–120)
ALT FLD-CCNC: 26 U/L — SIGNIFICANT CHANGE UP (ref 10–45)
ANION GAP SERPL CALC-SCNC: 11 MMOL/L — SIGNIFICANT CHANGE UP (ref 5–17)
APPEARANCE UR: CLEAR — SIGNIFICANT CHANGE UP
AST SERPL-CCNC: 19 U/L — SIGNIFICANT CHANGE UP (ref 10–40)
BACTERIA # UR AUTO: ABNORMAL /HPF
BASOPHILS # BLD AUTO: 0.04 K/UL — SIGNIFICANT CHANGE UP (ref 0–0.2)
BASOPHILS NFR BLD AUTO: 0.5 % — SIGNIFICANT CHANGE UP (ref 0–2)
BILIRUB SERPL-MCNC: 0.4 MG/DL — SIGNIFICANT CHANGE UP (ref 0.2–1.2)
BILIRUB UR-MCNC: NEGATIVE — SIGNIFICANT CHANGE UP
BUN SERPL-MCNC: 9 MG/DL — SIGNIFICANT CHANGE UP (ref 7–23)
CALCIUM SERPL-MCNC: 9.6 MG/DL — SIGNIFICANT CHANGE UP (ref 8.4–10.5)
CAST: 0 /LPF — SIGNIFICANT CHANGE UP (ref 0–4)
CHLORIDE SERPL-SCNC: 102 MMOL/L — SIGNIFICANT CHANGE UP (ref 96–108)
CO2 SERPL-SCNC: 22 MMOL/L — SIGNIFICANT CHANGE UP (ref 22–31)
COLOR SPEC: YELLOW — SIGNIFICANT CHANGE UP
CREAT SERPL-MCNC: 0.64 MG/DL — SIGNIFICANT CHANGE UP (ref 0.5–1.3)
D DIMER BLD IA.RAPID-MCNC: 720 NG/ML DDU — HIGH
DIFF PNL FLD: NEGATIVE — SIGNIFICANT CHANGE UP
EGFR: 127 ML/MIN/1.73M2 — SIGNIFICANT CHANGE UP
EOSINOPHIL # BLD AUTO: 0.03 K/UL — SIGNIFICANT CHANGE UP (ref 0–0.5)
EOSINOPHIL NFR BLD AUTO: 0.4 % — SIGNIFICANT CHANGE UP (ref 0–6)
FLUAV AG NPH QL: DETECTED
FLUBV AG NPH QL: SIGNIFICANT CHANGE UP
GLUCOSE SERPL-MCNC: 103 MG/DL — HIGH (ref 70–99)
GLUCOSE UR QL: NEGATIVE MG/DL — SIGNIFICANT CHANGE UP
HCT VFR BLD CALC: 39.3 % — SIGNIFICANT CHANGE UP (ref 34.5–45)
HGB BLD-MCNC: 13.1 G/DL — SIGNIFICANT CHANGE UP (ref 11.5–15.5)
IMM GRANULOCYTES NFR BLD AUTO: 0.3 % — SIGNIFICANT CHANGE UP (ref 0–0.9)
KETONES UR-MCNC: NEGATIVE MG/DL — SIGNIFICANT CHANGE UP
LACTATE SERPL-SCNC: 1 MMOL/L — SIGNIFICANT CHANGE UP (ref 0.5–2)
LEUKOCYTE ESTERASE UR-ACNC: ABNORMAL
LYMPHOCYTES # BLD AUTO: 0.82 K/UL — LOW (ref 1–3.3)
LYMPHOCYTES # BLD AUTO: 10.5 % — LOW (ref 13–44)
MCHC RBC-ENTMCNC: 28.6 PG — SIGNIFICANT CHANGE UP (ref 27–34)
MCHC RBC-ENTMCNC: 33.3 GM/DL — SIGNIFICANT CHANGE UP (ref 32–36)
MCV RBC AUTO: 85.8 FL — SIGNIFICANT CHANGE UP (ref 80–100)
MONOCYTES # BLD AUTO: 0.59 K/UL — SIGNIFICANT CHANGE UP (ref 0–0.9)
MONOCYTES NFR BLD AUTO: 7.6 % — SIGNIFICANT CHANGE UP (ref 2–14)
NEUTROPHILS # BLD AUTO: 6.28 K/UL — SIGNIFICANT CHANGE UP (ref 1.8–7.4)
NEUTROPHILS NFR BLD AUTO: 80.7 % — HIGH (ref 43–77)
NITRITE UR-MCNC: NEGATIVE — SIGNIFICANT CHANGE UP
NRBC # BLD: 0 /100 WBCS — SIGNIFICANT CHANGE UP (ref 0–0)
PH UR: 7.5 — SIGNIFICANT CHANGE UP (ref 5–8)
PLATELET # BLD AUTO: 284 K/UL — SIGNIFICANT CHANGE UP (ref 150–400)
POTASSIUM SERPL-MCNC: 4 MMOL/L — SIGNIFICANT CHANGE UP (ref 3.5–5.3)
POTASSIUM SERPL-SCNC: 4 MMOL/L — SIGNIFICANT CHANGE UP (ref 3.5–5.3)
PROT SERPL-MCNC: 7.3 G/DL — SIGNIFICANT CHANGE UP (ref 6–8.3)
PROT UR-MCNC: NEGATIVE MG/DL — SIGNIFICANT CHANGE UP
RBC # BLD: 4.58 M/UL — SIGNIFICANT CHANGE UP (ref 3.8–5.2)
RBC # FLD: 12.6 % — SIGNIFICANT CHANGE UP (ref 10.3–14.5)
RBC CASTS # UR COMP ASSIST: 2 /HPF — SIGNIFICANT CHANGE UP (ref 0–4)
RSV RNA NPH QL NAA+NON-PROBE: SIGNIFICANT CHANGE UP
SARS-COV-2 RNA SPEC QL NAA+PROBE: SIGNIFICANT CHANGE UP
SODIUM SERPL-SCNC: 135 MMOL/L — SIGNIFICANT CHANGE UP (ref 135–145)
SP GR SPEC: 1.01 — SIGNIFICANT CHANGE UP (ref 1–1.03)
SQUAMOUS # UR AUTO: 4 /HPF — SIGNIFICANT CHANGE UP (ref 0–5)
TROPONIN T, HIGH SENSITIVITY RESULT: <6 NG/L — SIGNIFICANT CHANGE UP (ref 0–51)
UROBILINOGEN FLD QL: 0.2 MG/DL — SIGNIFICANT CHANGE UP (ref 0.2–1)
WBC # BLD: 7.78 K/UL — SIGNIFICANT CHANGE UP (ref 3.8–10.5)
WBC # FLD AUTO: 7.78 K/UL — SIGNIFICANT CHANGE UP (ref 3.8–10.5)
WBC UR QL: 5 /HPF — SIGNIFICANT CHANGE UP (ref 0–5)

## 2024-01-27 PROCEDURE — 71045 X-RAY EXAM CHEST 1 VIEW: CPT

## 2024-01-27 PROCEDURE — 84484 ASSAY OF TROPONIN QUANT: CPT

## 2024-01-27 PROCEDURE — 99285 EMERGENCY DEPT VISIT HI MDM: CPT | Mod: 25

## 2024-01-27 PROCEDURE — 96361 HYDRATE IV INFUSION ADD-ON: CPT

## 2024-01-27 PROCEDURE — 80053 COMPREHEN METABOLIC PANEL: CPT

## 2024-01-27 PROCEDURE — 93010 ELECTROCARDIOGRAM REPORT: CPT

## 2024-01-27 PROCEDURE — 87637 SARSCOV2&INF A&B&RSV AMP PRB: CPT

## 2024-01-27 PROCEDURE — 96365 THER/PROPH/DIAG IV INF INIT: CPT | Mod: XU

## 2024-01-27 PROCEDURE — 83605 ASSAY OF LACTIC ACID: CPT

## 2024-01-27 PROCEDURE — 71275 CT ANGIOGRAPHY CHEST: CPT | Mod: 26,MA

## 2024-01-27 PROCEDURE — 87040 BLOOD CULTURE FOR BACTERIA: CPT

## 2024-01-27 PROCEDURE — 36415 COLL VENOUS BLD VENIPUNCTURE: CPT

## 2024-01-27 PROCEDURE — 85025 COMPLETE CBC W/AUTO DIFF WBC: CPT

## 2024-01-27 PROCEDURE — 93005 ELECTROCARDIOGRAM TRACING: CPT

## 2024-01-27 PROCEDURE — 71275 CT ANGIOGRAPHY CHEST: CPT | Mod: MA

## 2024-01-27 PROCEDURE — 85379 FIBRIN DEGRADATION QUANT: CPT

## 2024-01-27 PROCEDURE — 81001 URINALYSIS AUTO W/SCOPE: CPT

## 2024-01-27 PROCEDURE — 99285 EMERGENCY DEPT VISIT HI MDM: CPT

## 2024-01-27 PROCEDURE — 82962 GLUCOSE BLOOD TEST: CPT

## 2024-01-27 PROCEDURE — 96375 TX/PRO/DX INJ NEW DRUG ADDON: CPT | Mod: XU

## 2024-01-27 PROCEDURE — 71045 X-RAY EXAM CHEST 1 VIEW: CPT | Mod: 26

## 2024-01-27 RX ORDER — SODIUM CHLORIDE 9 MG/ML
1000 INJECTION INTRAMUSCULAR; INTRAVENOUS; SUBCUTANEOUS ONCE
Refills: 0 | Status: COMPLETED | OUTPATIENT
Start: 2024-01-27 | End: 2024-01-27

## 2024-01-27 RX ORDER — CEFTRIAXONE 500 MG/1
1000 INJECTION, POWDER, FOR SOLUTION INTRAMUSCULAR; INTRAVENOUS ONCE
Refills: 0 | Status: COMPLETED | OUTPATIENT
Start: 2024-01-27 | End: 2024-01-27

## 2024-01-27 RX ORDER — KETOROLAC TROMETHAMINE 30 MG/ML
15 SYRINGE (ML) INJECTION ONCE
Refills: 0 | Status: DISCONTINUED | OUTPATIENT
Start: 2024-01-27 | End: 2024-01-27

## 2024-01-27 RX ORDER — ONDANSETRON 8 MG/1
4 TABLET, FILM COATED ORAL ONCE
Refills: 0 | Status: COMPLETED | OUTPATIENT
Start: 2024-01-27 | End: 2024-01-27

## 2024-01-27 RX ORDER — ACETAMINOPHEN 500 MG
1000 TABLET ORAL ONCE
Refills: 0 | Status: COMPLETED | OUTPATIENT
Start: 2024-01-27 | End: 2024-01-27

## 2024-01-27 RX ADMIN — Medication 75 MILLIGRAM(S): at 13:37

## 2024-01-27 RX ADMIN — SODIUM CHLORIDE 1000 MILLILITER(S): 9 INJECTION INTRAMUSCULAR; INTRAVENOUS; SUBCUTANEOUS at 11:50

## 2024-01-27 RX ADMIN — CEFTRIAXONE 100 MILLIGRAM(S): 500 INJECTION, POWDER, FOR SOLUTION INTRAMUSCULAR; INTRAVENOUS at 11:50

## 2024-01-27 RX ADMIN — ONDANSETRON 4 MILLIGRAM(S): 8 TABLET, FILM COATED ORAL at 12:54

## 2024-01-27 RX ADMIN — SODIUM CHLORIDE 1000 MILLILITER(S): 9 INJECTION INTRAMUSCULAR; INTRAVENOUS; SUBCUTANEOUS at 15:32

## 2024-01-27 RX ADMIN — SODIUM CHLORIDE 1000 MILLILITER(S): 9 INJECTION INTRAMUSCULAR; INTRAVENOUS; SUBCUTANEOUS at 12:31

## 2024-01-27 RX ADMIN — Medication 15 MILLIGRAM(S): at 11:50

## 2024-01-27 RX ADMIN — Medication 1000 MILLIGRAM(S): at 11:50

## 2024-01-27 RX ADMIN — Medication 1000 MILLIGRAM(S): at 12:30

## 2024-01-27 RX ADMIN — CEFTRIAXONE 1000 MILLIGRAM(S): 500 INJECTION, POWDER, FOR SOLUTION INTRAMUSCULAR; INTRAVENOUS at 12:32

## 2024-01-27 RX ADMIN — Medication 15 MILLIGRAM(S): at 12:30

## 2024-01-27 NOTE — ED PROVIDER NOTE - PATIENT PORTAL LINK FT
You can access the FollowMyHealth Patient Portal offered by [facility] by registering at the following website: [email or web address]. By joining [facility]’s FollowMyHealth portal, you will also be able to view your health information using other applications (apps) compatible with our system.

## 2024-01-27 NOTE — ED ADULT TRIAGE NOTE - CHIEF COMPLAINT QUOTE
pmhx anxiety, cough x3 days, now SOB, CP, HA and weak today where she collapsed while getting out of bed. tachycardic in triage to 130's, ekg and fs completed.

## 2024-01-27 NOTE — ED PROVIDER NOTE - PHYSICAL EXAMINATION
VITAL SIGNS: I have reviewed nursing notes and confirm.  CONSTITUTIONAL: Weak appearing, in no acute distress.   SKIN:  warm and dry, no acute rash.   HEAD:  normocephalic, atraumatic.  EYES: EOM intact; conjunctiva and sclera clear.  ENT: No nasal discharge; airway clear. Mildly dry mm.   NECK: Supple.  CARD: S1, S2, tachcyardic rate and reg rhythm.   RESP:  Clear to auscultation b/l, no wheezes, rales or rhonchi.  ABD: Normal bowel sounds; soft; non-distended; non-tender; no guarding/ rebound.  EXT: Normal ROM. No peripheral edema. Pulses intact and equal b/l.  NEURO: Alert, oriented, grossly unremarkable

## 2024-01-27 NOTE — ED PROVIDER NOTE - OBJECTIVE STATEMENT
Pt is a 24yo f, h/o depression, bpd, scoliosis, who p/w c/o cough starting 2d ago, with subj warmth, chills, sweats, dizziness, generalized fatigue and myalgias starting this am. + pleuritic, sharp, pain to lower sternal region w/ coughing starting today, a/w mild sob. + n/v/d yesterday, none today. No dysuria, hematuria, flank pain, abd pain. Pt states she fell while getting out of bed this am due to dizziness and weakness, no head trauma/ loc. Pt works as a  and is exposed to sick children. No recent travel. No leg pain, swelling, prolonged immobility, h/o thromboembolism. Non-smoker.

## 2024-01-27 NOTE — ED ADULT NURSE NOTE - OBJECTIVE STATEMENT
Pt A&Ox4 presents to ED with complaints of shortness of breath. Pt endorses generalized body aches, chills, dizziness, and fatigue x 2 days. 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 Reports nausea and chest discomfort. EKG obtained. Placed on continuous cardiac monitor and pulse ox. Hx bpd and scoliosis. Denies recent travel, diarrhea, urinary symptoms.

## 2024-01-27 NOTE — ED PROVIDER NOTE - CLINICAL SUMMARY MEDICAL DECISION MAKING FREE TEXT BOX
Impression: Pt is a 22yo f, h/o depression, bpd, scoliosis, who p/w c/o cough starting 2d ago, with subj warmth, chills, sweats, dizziness, generalized fatigue and myalgias starting this am. + pleuritic, sharp, pain to lower sternal region w/ coughing starting today, a/w mild sob. + n/v/d yesterday, none today. No dysuria, hematuria, flank pain, abd pain. Pt states she fell while getting out of bed this am due to dizziness and weakness, no head trauma/ loc. Pt works as a  and is exposed to sick children. No recent travel. No leg pain, swelling, prolonged immobility, h/o thromboembolism. Non-smoker. Febrile and tachycardic on arrival. Suspect likely viral illness. Will check labs, obtain cxr to r/o pna, give ivf, tylenol, toradol, abx. Will re-assess. Impression: Pt is a 22yo f, h/o depression, bpd, scoliosis, who p/w c/o cough starting 2d ago, with subj warmth, chills, sweats, dizziness, generalized fatigue and myalgias starting this am. + pleuritic, sharp, pain to lower sternal region w/ coughing starting today, a/w mild sob. + n/v/d yesterday, none today. No dysuria, hematuria, flank pain, abd pain. Pt states she fell while getting out of bed this am due to dizziness and weakness, no head trauma/ loc. Pt works as a  and is exposed to sick children. No recent travel. No leg pain, swelling, prolonged immobility, h/o thromboembolism. Non-smoker. Febrile and tachycardic on arrival. Suspect likely viral illness. Will check labs, obtain cxr to r/o pna, give ivf, tylenol, toradol, abx. Will re-assess.    CXR neg for i/e. UA neg for infection. No leukocytosis w/ normal lactate. Trop neg. D-dimer elev to 720. EKG showing sinus tach, no stemi. Pt is flu a pos. CTA chest neg for pe. Pt feeling improved w/ tx in ed. First dose of tamiflu administered. ED evaluation and management discussed with the patient in detail.  Close PMD follow up encouraged.  Strict ED return instructions discussed in detail and patient given the opportunity to ask any questions about their discharge diagnosis and instructions. Patient verbalized understanding.

## 2024-01-27 NOTE — ED PROVIDER NOTE - NSFOLLOWUPINSTRUCTIONS_ED_ALL_ED_FT
Drink plenty of fluids.  Take tamiflu twice daily for the next 5 days.  Take tylenol or ibuprofen every 6 hours as needed for fever and bodyaches.  Follow up with your primary care physician for re-evaluation.  Return to er for any new or worsening symptoms.     Influenza    WHAT YOU NEED TO KNOW:    Influenza (the flu) is an infection caused by the influenza virus. The virus spreads through direct contact with someone who has the flu. For example, a person with the virus on his or her hands can spread it by shaking hands with someone. You may be able to spread the flu to others for 1 week or longer after signs or symptoms appear.    WHILE YOU ARE HERE:    Informed consent is a legal document that explains the tests, treatments, or procedures that you may need. Informed consent means you understand what will be done and can make decisions about what you want. You give your permission when you sign the consent form. You can have someone sign this form for you if you are not able to sign it. You have the right to understand your medical care in words you know. Before you sign the consent form, understand the risks and benefits of what will be done. Make sure all your questions are answered.    An IV is a small tube placed in your vein that is used to give you medicine or liquids.    You may need extra oxygen if your blood oxygen level is lower than it should be. You may get oxygen through a mask placed over your nose and mouth or through small tubes placed in your nostrils. Ask your healthcare provider before you take off the mask or oxygen tubing.    Isolation: You will need to wear a mask to help prevent the spread of the flu to other people. People near you should wear a mask, and they may also wear gloves, goggles, and a gown. People who enter your room should wash their hands before they leave.    Medicines:    Acetaminophen decreases pain and fever.    NSAIDs decrease pain and fever.    Bronchodilators may be given to help open your airways so you can breathe more easily.    Antivirals help fight an infection caused by a virus.  Treatment:    Suction helps remove mucus so you can breathe more easily. A small tube is placed in your mouth or nose.    Nebulizer treatments are medicines that you inhale to help you breathe more easily. The medicines are a mist given through a mouthpiece or mask.    A ventilator is a machine that gives you oxygen and breathes for you when you cannot breathe well on your own. An endotracheal (ET) tube is put into your mouth or nose and attached to the ventilator. You may need a trach if an ET tube cannot be placed. A trach is a tube put through an incision and into your windpipe.  RISKS:    You may develop dehydration. If the dehydration becomes severe, it can cause kidney, heart, and brain damage. Asthma, lung disease, and heart disease may get worse when you have the flu. The flu can lead to ear, throat, and sinus infections. If you have a high fever, you may begin to have seizures. You may get lung infections such as pneumonia or bronchitis. You may get an infection in your blood, heart, or brain. The flu can be life-threatening.    CARE AGREEMENT:    You have the right to help plan your care. Learn about your health condition and how it may be treated. Discuss treatment options with your healthcare providers to decide what care you want to receive. You always have the right to refuse treatment.    © [employer] 1973, 2024

## 2024-01-27 NOTE — ED ADULT NURSE NOTE - AS PAIN REST
Pt's specifically requesting a RTC from PCP to go over lab results    LCV 12/28/16    Please auth request    3 (mild pain)

## 2024-01-30 ENCOUNTER — APPOINTMENT (OUTPATIENT)
Dept: PSYCHIATRY | Facility: CLINIC | Age: 24
End: 2024-01-30
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
## 2024-01-31 NOTE — PHYSICAL EXAM
[Cooperative] : cooperative [Euthymic] : euthymic [Anxious] : anxious [Full] : full [Clear] : clear [Linear/Goal Directed] : linear/goal directed [Average] : average [WNL] : within normal limits [Mild] : mild

## 2024-01-31 NOTE — PLAN
[FreeTextEntry2] : Increased sense of self-efficacy; increased sense of agency.  [Psychodynamic Therapy] : Psychodynamic Therapy  [Supportive Therapy] : Supportive Therapy [de-identified] : Pt seen in her home, interacting with her daughter. David spoke about coping with competing priorities and demands, while reckoning with the ongoing impact of her own early life adversity, on her sense of self, beliefs of others, and ability to cope with persistent stressors. She would like her daughter to have a very different kind of childhood. She was thoughtful, reflective, engaged and well-related.  [Recommended Frequency of Visits: ____] : Recommended frequency of visits: [unfilled] [FreeTextEntry1] : Weekly individual therapy. monthly medication management. Not interested in group interventions right now, but following up with other supports related to housing, finances, and occupational development, offered by her .
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
## 2024-01-31 NOTE — REASON FOR VISIT
[Patient preference] : as per patient preference [Telehealth (audio & video) - Individual/Group] : This visit was provided via telehealth using real-time 2-way audio visual technology. [Medical Office: ([facility])___] : The provider was located at the medical office in [unfilled]. [Home] : The patient, [unfilled], was located at home, [unfilled], at the time of the visit. [Verbal consent obtained from patient/other participant(s)] : Verbal consent for telehealth/telephonic services obtained from patient/other participant(s) [FreeTextEntry4] : 1pm [FreeTextEntry5] : 130pm [FreeTextEntry2] : On 8URIS durin ginpatient hospitalization [Patient] : Patient [FreeTextEntry1] : "I want to feel better about myself."

## 2024-01-31 NOTE — REASON FOR VISIT
[Patient preference] : as per patient preference [Telehealth (audio & video) - Individual/Group] : This visit was provided via telehealth using real-time 2-way audio visual technology. [Medical Office: ([facility])___] : The provider was located at the medical office in [unfilled]. [Home] : The patient, [unfilled], was located at home, [unfilled], at the time of the visit. [Verbal consent obtained from patient/other participant(s)] : Verbal consent for telehealth/telephonic services obtained from patient/other participant(s) [FreeTextEntry4] : 1pm [FreeTextEntry5] : 130pm [FreeTextEntry2] : On 8URIS durin ginpatient hospitalization [Patient] : Patient [FreeTextEntry1] : "I want to feel better about myself."

## 2024-01-31 NOTE — END OF VISIT
[Duration of Psychotherapy Visit (minutes spent in synchronous communication): ____] : Duration of Psychotherapy Visit (minutes spent in synchronous communication): [unfilled] [Individual Psychotherapy for 16-37 minutes] : Individual Psychotherapy for 16-37 minutes [Teletherapy Service Provided] : The services provided in this session were delivered via tele-therapy [FreeTextEntry3] : home [FreeTextEntry5] : [facility] -- [address] [place] [address].  [Licensed Clinician] : Licensed Clinician

## 2024-01-31 NOTE — PLAN
[FreeTextEntry2] : Increased sense of self-efficacy; increased sense of agency.  [Psychodynamic Therapy] : Psychodynamic Therapy  [Supportive Therapy] : Supportive Therapy [de-identified] : Pt seen in her home, with her daughter present. Her daughter was observed to be cheerful and the two of them enjoyed a few playful and affectionate interactions thhrougout the session. David spoke about making persistent efforts to maintain self-control and focus on long-term priorities, which requires better distress tolerance. This includes consistent medication adherence as well. She reflected on changes in her sense of herself and how she wants to be in the world and in her relationships since the birth of her daughter. She was thoughtful, self-reflective and engaged.  [Recommended Frequency of Visits: ____] : Recommended frequency of visits: [unfilled] [FreeTextEntry1] : Weekly individual therapy. monthly medication management. Not interested in group interventions right now, but following up with other supports related to housing, finances, and occupational development, offered by her .

## 2024-01-31 NOTE — END OF VISIT
[Duration of Psychotherapy Visit (minutes spent in synchronous communication): ____] : Duration of Psychotherapy Visit (minutes spent in synchronous communication): [unfilled] [Individual Psychotherapy for 16-37 minutes] : Individual Psychotherapy for 16-37 minutes [Teletherapy Service Provided] : The services provided in this session were delivered via tele-therapy [FreeTextEntry3] : home [FreeTextEntry5] : [facility] -- [address] [place] [address].  [Licensed Clinician] : Licensed Clinician

## 2024-02-01 LAB
CULTURE RESULTS: SIGNIFICANT CHANGE UP
CULTURE RESULTS: SIGNIFICANT CHANGE UP
SPECIMEN SOURCE: SIGNIFICANT CHANGE UP
SPECIMEN SOURCE: SIGNIFICANT CHANGE UP

## 2024-02-06 ENCOUNTER — APPOINTMENT (OUTPATIENT)
Dept: PSYCHIATRY | Facility: CLINIC | Age: 24
End: 2024-02-06

## 2024-02-06 ENCOUNTER — NON-APPOINTMENT (OUTPATIENT)
Age: 24
End: 2024-02-06

## 2024-02-07 ENCOUNTER — APPOINTMENT (OUTPATIENT)
Age: 24
End: 2024-02-07

## 2024-02-08 NOTE — DISCUSSION/SUMMARY
[FreeTextEntry1] : Pt could not connect with writer for appt on 2/7 though she was in waiting room- rescheduled with .  Writer sent 2wk supply of prozac and seroquel to pharmacy so she doesn't run out in interim.

## 2024-02-13 ENCOUNTER — APPOINTMENT (OUTPATIENT)
Dept: PSYCHIATRY | Facility: CLINIC | Age: 24
End: 2024-02-13
Payer: MEDICAID

## 2024-02-13 PROCEDURE — 90832 PSYTX W PT 30 MINUTES: CPT | Mod: 95
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
## 2024-02-14 NOTE — REASON FOR VISIT
[Patient preference] : as per patient preference [Telehealth (audio & video) - Individual/Group] : This visit was provided via telehealth using real-time 2-way audio visual technology. [Medical Office: ([facility])___] : The provider was located at the medical office in [unfilled]. [Home] : The patient, [unfilled], was located at home, [unfilled], at the time of the visit. [Verbal consent obtained from patient/other participant(s)] : Verbal consent for telehealth/telephonic services obtained from patient/other participant(s) [FreeTextEntry4] : 1pm [FreeTextEntry5] : 130pm [FreeTextEntry2] : On 8URIS durin ginpatient hospitalization [Patient] : Patient [FreeTextEntry1] : "I want to feel better about myself."

## 2024-02-14 NOTE — END OF VISIT
[Duration of Psychotherapy Visit (minutes spent in synchronous communication): ____] : Duration of Psychotherapy Visit (minutes spent in synchronous communication): [unfilled] [Individual Psychotherapy for 16-37 minutes] : Individual Psychotherapy for 16-37 minutes [Teletherapy Service Provided] : The services provided in this session were delivered via tele-therapy [FreeTextEntry3] : home [FreeTextEntry5] : [facility] -- [address] [place] [address].  [Licensed Clinician] : Licensed Clinician
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
## 2024-02-20 ENCOUNTER — APPOINTMENT (OUTPATIENT)
Dept: PSYCHIATRY | Facility: CLINIC | Age: 24
End: 2024-02-20

## 2024-02-21 ENCOUNTER — APPOINTMENT (OUTPATIENT)
Age: 24
End: 2024-02-21
Payer: MEDICAID

## 2024-02-21 DIAGNOSIS — F32.A DEPRESSION, UNSPECIFIED: ICD-10-CM

## 2024-02-21 PROCEDURE — 99213 OFFICE O/P EST LOW 20 MIN: CPT | Mod: 95

## 2024-02-21 RX ORDER — QUETIAPINE FUMARATE 25 MG/1
25 TABLET ORAL
Qty: 30 | Refills: 0 | Status: ACTIVE | COMMUNITY
Start: 2024-01-10 | End: 1900-01-01

## 2024-02-21 RX ORDER — FLUOXETINE HYDROCHLORIDE 40 MG/1
40 CAPSULE ORAL
Qty: 30 | Refills: 0 | Status: ACTIVE | COMMUNITY
Start: 2023-10-26 | End: 1900-01-01

## 2024-02-27 ENCOUNTER — NON-APPOINTMENT (OUTPATIENT)
Age: 24
End: 2024-02-27

## 2024-02-27 ENCOUNTER — APPOINTMENT (OUTPATIENT)
Dept: PSYCHIATRY | Facility: CLINIC | Age: 24
End: 2024-02-27

## 2024-03-01 ENCOUNTER — OUTPATIENT (OUTPATIENT)
Dept: OUTPATIENT SERVICES | Facility: HOSPITAL | Age: 24
LOS: 1 days | Discharge: HOME | End: 2024-03-01

## 2024-03-05 ENCOUNTER — NON-APPOINTMENT (OUTPATIENT)
Age: 24
End: 2024-03-05

## 2024-03-12 ENCOUNTER — APPOINTMENT (OUTPATIENT)
Dept: PSYCHIATRY | Facility: CLINIC | Age: 24
End: 2024-03-12
Payer: MEDICAID

## 2024-03-12 PROCEDURE — 90832 PSYTX W PT 30 MINUTES: CPT | Mod: 95

## 2024-03-12 NOTE — PLAN
[FreeTextEntry2] : Increased sense of self-efficacy; increased sense of agency.  [Supportive Therapy] : Supportive Therapy [Psychodynamic Therapy] : Psychodynamic Therapy  [de-identified] : Pt seen in her home. Noted that it's been hard to attend sessions recently b/c of illness and competing demands -- e.g. childcare, work. Discussed all the efforts she's been making to improve her circumstances, specifically supportive housing and exploring new tx options tailored to BPD. She was thoughtful, engaged collaborative. Noted no EtOH use and MJ use only 2x week. Decreased substance use = increased nightmares and depressed mood but she is persisting in her reduction of use. Discussed ways to support improved tx engagement.  [Recommended Frequency of Visits: ____] : Recommended frequency of visits: [unfilled] [FreeTextEntry1] : Weekly individual therapy. monthly medication management. Not interested in group interventions right now, but following up with other supports related to housing, finances, and occupational development, offered by her . Will complete paperwork to support supportive housing application.

## 2024-03-12 NOTE — END OF VISIT
[Individual Psychotherapy for 16-37 minutes] : Individual Psychotherapy for 16-37 minutes [Duration of Psychotherapy Visit (minutes spent in synchronous communication): ____] : Duration of Psychotherapy Visit (minutes spent in synchronous communication): [unfilled] [Teletherapy Service Provided] : The services provided in this session were delivered via tele-therapy [FreeTextEntry3] : home [FreeTextEntry5] : [facility] -- [address] [place] [address].  [Licensed Clinician] : Licensed Clinician

## 2024-03-12 NOTE — REASON FOR VISIT
[Patient preference] : as per patient preference [Telehealth (audio & video) - Individual/Group] : This visit was provided via telehealth using real-time 2-way audio visual technology. [Medical Office: ([facility])___] : The provider was located at the medical office in [unfilled]. [Home] : The patient, [unfilled], was located at home, [unfilled], at the time of the visit. [Verbal consent obtained from patient/other participant(s)] : Verbal consent for telehealth/telephonic services obtained from patient/other participant(s) [FreeTextEntry4] : 135 [FreeTextEntry5] : 205pm [FreeTextEntry2] : On 8URIS durin ginpatient hospitalization [Patient] : Patient [FreeTextEntry1] : "I want to feel better about myself."
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
## 2024-03-13 DIAGNOSIS — F34.1 DYSTHYMIC DISORDER: ICD-10-CM

## 2024-03-20 ENCOUNTER — APPOINTMENT (OUTPATIENT)
Dept: PSYCHIATRY | Facility: CLINIC | Age: 24
End: 2024-03-20
Payer: MEDICAID

## 2024-03-20 DIAGNOSIS — F12.10 CANNABIS ABUSE, UNCOMPLICATED: ICD-10-CM

## 2024-03-20 PROCEDURE — 90832 PSYTX W PT 30 MINUTES: CPT | Mod: 95
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
## 2024-03-20 NOTE — REASON FOR VISIT
[Telehealth (audio & video) - Individual/Group] : This visit was provided via telehealth using real-time 2-way audio visual technology. [Patient preference] : Patient preference. [Medical Office: ([facility])___] : The provider was located at the medical office in [unfilled]. [Home] : The patient, [unfilled], was located at home, [unfilled], at the time of the visit. [Verbal consent obtained from patient/other participant(s)] : Verbal consent for telehealth/telephonic services obtained from patient/other participant(s) [FreeTextEntry4] : 3 [FreeTextEntry5] : 012 [FreeTextEntry2] : On 8URIS during inpatient hospitalization [Patient] : Patient [FreeTextEntry1] : "I want to feel better about myself."

## 2024-03-20 NOTE — END OF VISIT
[Duration of Psychotherapy Visit (minutes spent in synchronous communication): ____] : Duration of Psychotherapy Visit (minutes spent in synchronous communication): [unfilled] [Teletherapy Service Provided] : The services provided in this session were delivered via tele-therapy [Individual Psychotherapy for 16-37 minutes] : Individual Psychotherapy for 16-37 minutes [FreeTextEntry5] : [place], [place].  [FreeTextEntry3] : home [Licensed Clinician] : Licensed Clinician

## 2024-03-20 NOTE — PHYSICAL EXAM
[Cooperative] : cooperative [Anxious] : anxious [Full] : full [Clear] : clear [Linear/Goal Directed] : linear/goal directed [Average] : average [WNL] : within normal limits [Mild] : mild [FreeTextEntry8] : Distant

## 2024-03-31 NOTE — ED ADULT NURSE NOTE - SUICIDE SCREENING QUESTION 1
[FreeTextEntry1] : Bishop is a 11yo M with hx with ASD, ADD, ODD, anxiety febrile seizure here for initial evaluation for weight management, referred by Peds Endocrine. \par \par BMI 39/>99%\par \par Plan:\par - Recommendations: continue with daily physical activity, mindful eating  \par - Discussed in length: Positive reinforcement of healthy behavioral changes and thoughts, minimize negative comments/punishment.  Encourage patient control of choosing healthy foods, recipes, mindfulness and accountability for choices to foster self-esteem and autonomy. Involve patient in finding their motivation and interests. Suggested short term goals for positive behavioral changes and rewards\par - Discussed in length health risks in childhood obesity: high blood pressure, high cholesterol, risk factors for cardiovascular disease, increased risk of impaired glucose tolerance, insulin resistance, type 2 diabetes, breathing problems, such as asthma and sleep apnea, joint problems, musculoskeletal discomfort, fatty liver disease, gallstones, GERD/heartburn, anxiety, depression, low self-esteem \par - Recent labs reviewed with mother \par - See nutritionist notes for meal plan and physical activity recommendations\par - Followup with nutritionist\par \par  dizziness No

## 2024-04-04 ENCOUNTER — APPOINTMENT (OUTPATIENT)
Dept: PSYCHIATRY | Facility: CLINIC | Age: 24
End: 2024-04-04
Payer: MEDICAID

## 2024-04-04 PROCEDURE — 90832 PSYTX W PT 30 MINUTES: CPT | Mod: 95
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
## 2024-04-07 NOTE — END OF VISIT
[Duration of Psychotherapy Visit (minutes spent in synchronous communication): ____] : Duration of Psychotherapy Visit (minutes spent in synchronous communication): [unfilled] [Individual Psychotherapy for 16-37 minutes] : Individual Psychotherapy for 16-37 minutes [Teletherapy Service Provided] : The services provided in this session were delivered via tele-therapy [FreeTextEntry3] : home [FreeTextEntry5] : [place], [place].  [Licensed Clinician] : Licensed Clinician

## 2024-04-07 NOTE — PLAN
[FreeTextEntry2] : Increased sense of self-efficacy; increased sense of agency.  [Psychodynamic Therapy] : Psychodynamic Therapy  [Supportive Therapy] : Supportive Therapy [de-identified] : Pt seen in her home interacting with her daughter and completing ADLs. She discussed progress on housing/social supports, and her plan to attend appointments the next day with her daughter. Discussed coping with delays and access barriers, and wanting to feel more equipped to handle challenges in the long-term. She asked to consider other treatment otpions -- groups, referral to [employer] -- and we talked about what she is hoping to gain and what she can commit to. She was thoughtful, engaged and collaboraitve.  [Recommended Frequency of Visits: ____] : Recommended frequency of visits: [unfilled] [FreeTextEntry1] : Weekly individual therapy. monthly medication management. Emerging interestin group interventions, will refer her to DBT and STAIR, but following up with other supports related to housing, finances, and occupational development, offered by her .

## 2024-04-07 NOTE — REASON FOR VISIT
[Telehealth (audio & video) - Individual/Group] : This visit was provided via telehealth using real-time 2-way audio visual technology. [Patient preference] : Patient preference. [Medical Office: ([facility])___] : The provider was located at the medical office in [unfilled]. [Home] : The patient, [unfilled], was located at home, [unfilled], at the time of the visit. [Verbal consent obtained from patient/other participant(s)] : Verbal consent for telehealth/telephonic services obtained from patient/other participant(s) [FreeTextEntry4] : 230 [FreeTextEntry5] : 300 [FreeTextEntry2] : On 8URIS during inpatient hospitalization [Patient] : Patient [FreeTextEntry1] : "I want to feel better about myself."

## 2024-04-19 NOTE — ED BEHAVIORAL HEALTH ASSESSMENT NOTE - NS ED BHA REVIEW OF ED CHART VITAL SIGNS REVIEWED
Delivery Note:  Normal Spontaneous Vaginal Delivery     of non-vigorous Female , immediately taken to warmer after delivery.  Cord clamped and  CUT ON PERINEUM DT TIGHT DOUBLE NUCHAL CORD ; 3 Vessels , Complications: Nuchal .    Membranes:  Method of rupture: Spontaneous  on 2024  at 1:13 AM .  Fluid color was Clear .     Placenta:  Spontaneous  delivery of Intact  placenta.      Mother's Information      Lacerations    Episiotomy: None  Perineal laceration: 1st Degree  Perineal laceration repaired?: Yes  Labial laceration?: Yes  Labial laceration location: bilateral  Labial laceration repaired?: Yes            Events of Labor:   delivery:     Antibiotics received during labor:     Labor complications:  None  Nuchal Cord Affecting Delivery;Gestational Hypertension, Third Trimester;Odin-Danlos Disease      Review the Delivery Report for Details     Arvin Garcia MD      VAGINAL DELIVERY    DELIVERY NOTE  Delivery Note    Patient's Name: Edith Neumann    MRN: [id number] Age: 29 year old  YOB: 1995    Delivery Type: Vaginal, Spontaneous   Delivering Clinician: ARVIN GARCIA   Other Delivering Personnel:   Provider Role   MARTINEZ BARAJAS, YI OLMEDO, NOHELIA PIMENTEL  Delivery Nurse  Charge Nurse  Baby Nurse  Pediatrician        Anesthesia Type:Epidural    ROM to Delivery Time: (Delivered) Hours: 11 Minutes: 24  Amniotic fluid at delivery:  Clear    Antibiotics prior to delivery:       Delivery Date: 2024  Delivery Time:12:37 PM   Gestational Age at delivery: 39w0d   Sex: Female    Weight: 3170 g   Apgar Totals: 1 Minute 5 Minute 10 Minute    4   8   9         Placenta Removal: Spontaneous   Placenta Appearance:  Intact   Uterine Exploration:  CDH#042 does not exist. Please contact your  to configure this SmartLink.   Vaginal Sweep:        OB Complications: None   Additional OB Complications: Nuchal cord affecting  delivery;Gestational hypertension, third trimester;Odin-Danlos disease        FETAL STATUS REASSURING   by Reina Quiñones MD at 4/19/24 1126   FETAL STATUS RESSURING   by Reina Quiñones MD at 4/19/24 0959   FETAL STATUS REASSRUING OVERALL   by Reina Quiñones MD at 4/19/24 0814   FETAL STATUS REASSURING OVERALL   by Reina Quiñones MD at 4/19/24 0718   FETAL STATUS REASSURING OVERALL   by Reina Quiñones MD at 4/19/24 0703   minimal to moderate variability   by Liya Coelho, RN at 4/19/24 0500   FETAL REASSURING OVERALL   by Reina Quiñones MD at 4/19/24 0459   FETAL STATUS RESSURING   by Reina Quiñones MD at 4/19/24 0248   FETAL STATUS REASSRING OVERALL   by Reina Quiñones MD at 4/19/24 0100   FETAL STATUS REASSURING   by Reina Quiñones MD at 4/18/24 2324   FETAL STATUS REASSUIRNG   by Reina Quiñones MD at 4/18/24 2100   FETAL STATUS REASSRUING   by Reina Quiñones MD at 4/18/24 1901     IUPC PLACED WO DIFF; CLR FLASHBACK;   by Reina Quiñones MD at 4/19/24 0718   difficult to trace due to maternal position changes   by Liya Coelho, RN at 4/19/24 0600     RN reviewed strip   by Faye Robins RN at 4/19/24 1045   RN reviewed strip   by Faye Robins, RN at 4/19/24 1015   RN reviewed strip   by Faye Robins RN at 4/19/24 0945   Oxygen applied   by Faye Robins RN at 4/19/24 0934   RN reviewed strip   by Faye Robins RN at 4/19/24 0915   Provider at bedside   by Faye Robins RN at 4/19/24 0852   RN reviewed strip   by Faye Robins, RN at 4/19/24 0845   RN reviewed strip; RN at bedside   by Faye Robins RN at 4/19/24 0828   Provider reviewed strip   by Faye Robins, RN at 4/19/24 0815   RN reviewed strip   by Faye Robins, RN at 4/19/24 0745   Amnioinfusion   by Faye Robins, RN at 4/19/24 0735   RN at bedside; Provider reviewed strip; Provider at bedside   by Faye Robins, RN at 4/19/24 0719   RN reviewed strip   by Faye Robins, RN at 4/19/24 0715   RN reviewed strip; RN at  bedside   by Liya Coelho, RN at 4/19/24 0551   Oxygen applied   by Liya Coelho, RN at 4/19/24 0550   Pitocin discontinued   by Liya Coelho, RN at 4/19/24 0535   RN reviewed strip; RN at bedside   by Liya Coelho, RN at 4/19/24 0532   RN reviewed strip   by Liya Coelho, RN at 4/19/24 0515   RN reviewed strip; RN at bedside   by Liya Coelho, RN at 4/19/24 0451   RN at bedside   by Liya Coelho, RN at 4/19/24 0445   RN reviewed strip; RN at bedside; US adjusted   by Liya Coelho RN at 4/19/24 0430   RN reviewed strip   by Liya Coelho, RN at 4/19/24 0345   RN reviewed strip; RN at bedside   by Liya Coelho, RN at 4/19/24 0315   RN reviewed strip; RN at bedside   by Liya Coelho, RN at 4/19/24 0253   RN reviewed strip; RN at bedside   by Liya Coelho, RN at 4/19/24 0245   Fluid bolus   by Liya Coelho, RN at 4/19/24 0036   RN reviewed strip; RN at bedside   by Liya Coelho, RN at 4/19/24 0033   RN reviewed strip; RN at bedside   by Liya Coelho, RN at 4/19/24 0018   RN reviewed strip; RN at bedside; TOCO adjusted; US adjusted   by Liya Coelho, RN at 4/18/24 2358   RN reviewed strip; RN at bedside   by Liya Coelho, RN at 4/18/24 2156   RN reviewed strip; RN at bedside   by Liya Coelho, RN at 4/18/24 1948   RN reviewed strip; RN at bedside; US adjusted   by Liya Coelho, RN at 4/18/24 1940   RN at bedside   by Carolann Gonzalez, RN at 4/18/24 1846     Dr Thomas at bedside   by Faye Robins, RN at 4/19/24 0852   SQ TERB ORDERED; AMNIOINF ORDERED   by Reina Quiñones MD at 4/19/24 0727     Up to Bathroom   by Liya Coelho, RN at 4/19/24 0101   Up to Bathroom   by Liya Coelho, RN at 4/18/24 2352   Up to Bathroom   by Liya Coelho, RN at 4/18/24 2254   Up to Bathroom   by Liya Coelho, RN at 4/18/24 2156   Up to Bathroom   by Liya Coelho, RN at 4/18/24 2033   Up to Bathroom   by Carolann Gonzalez, RN at 4/18/24 1846     Sitting   by Faye Robins RN at 4/19/24 0985    Left lateral; Peanut ball   by Faye Robins RN at 24 0828   Left lateral   by Liya Coelho RN at 24 0558   Right lateral   by Liya Coelho RN at 24 0532   Left lateral   by Erin Lucia RN at 24 0338   Peanut ball; Right lateral   by Liya Coelho RN at 24 0317   Left lateral   by Liya Coelho RN at 24 0253   Left lateral   by Liya Coelho RN at 24 0037   Right tilt   by Liya Coelho RN at 24 0019   Left tilt   by Liya Coelho RN at 24 2037   Left lateral   by Liya Coelho RN at 24 1948     + stirrup   by Liya Coelho RN at 24 0558   + stirrup   by Liya Coelho RN at 24 0532   +stirrup   by Erin Lucia RN at 24 0338     0 mario-units/min   by Liya Coelho RN at 24 05:35:24   2 mario-units/min   by Liya Coelho RN at 24 0455   2 mario-units/min   by Liya Coelho RN at 24 04:54:51   *0 mario-units/min  There are multiple administrations at this time.  Please see the MAR for detailed information.   by --- at 24 2257     Rupturedby Liya Coelho RN at 24 0113       10   by Reina Quiñones MD at 24 1126   10   by Faye Robins RN at 24 1125   9   by Faye Robins RN at 24 0919   6   by Faye Robins RN at 24 0720   6   by Reina Quiñones MD at 24 0718   4   by Liya Coelho RN at 24 0509   3.5   by Erin Lucia RN at 24 0897   3   by Liya Coelho, RN at 24 0245   1   by Liya Coelho, RN at 24 0114   Fingertip   by Liya Coelho, RN at 24 2304   Fingertip   by Carolann Gonzalez RN at 24 6287          Review the Delivery Report for details.     Delivery Details:  Edith Neumann, a 29 year old  female who was admitted for induction of labor.    GHTN      Delivery was via Vaginal, Spontaneous  under Epidural  anesthesia. Patient to the unit elivered a viable Female  infant over a FIRST DEGREE PERINEAL  AND BILATERAL LABIAL LACERATION  with Apgars of 4  and 8 . Infant delivered in Vertex  presentation, Right  Occiput  Anterior  position. The head delivery atraumatically, easily and spontaneously.  Nose and mouth were bulb suctioned on the perineum.  Anterior and posterior shoulders delivered without difficulty.  Nose and mouth were again bulb suction.  Delayed cord clamping was performed.   Three vessel cord was milked to the fetus.   The cord was clamped and cut and 3 Vessels  were noted. Cord complications were Nuchal . Intact  placenta delivered at 2024 12:44 PM . Fundal massage performed and Pitocin administered and fundus found to be firm.     Perineum, vagina, cervix were inspected, and the following lacerations were noted:    Repair was performed using 2-0 VICRYL; 3-0 RAPIDE.  Excellent hemostasis was noted. Counts correct. Patient and  in delivery room in stable condition, tolerated procedure well.     Mom and baby are in stable condition.   They are naming the baby \"SASSY CLIFTON--SENA\".   There were no apparent complications.    Reina Quiñones MD  2024               Yes

## 2024-04-21 NOTE — CONSULT NOTE ADULT - CONSULT REQUESTED BY NAME
Keep area clean and dry.  Start antibiotics.  Follow-up with your primary care for any other concerns  
ED

## 2024-04-23 ENCOUNTER — APPOINTMENT (OUTPATIENT)
Dept: PSYCHIATRY | Facility: CLINIC | Age: 24
End: 2024-04-23

## 2024-04-23 ENCOUNTER — NON-APPOINTMENT (OUTPATIENT)
Age: 24
End: 2024-04-23

## 2024-04-29 ENCOUNTER — NON-APPOINTMENT (OUTPATIENT)
Age: 24
End: 2024-04-29

## 2024-04-29 ENCOUNTER — APPOINTMENT (OUTPATIENT)
Dept: PSYCHIATRY | Facility: CLINIC | Age: 24
End: 2024-04-29

## 2024-05-01 ENCOUNTER — APPOINTMENT (OUTPATIENT)
Dept: PSYCHIATRY | Facility: CLINIC | Age: 24
End: 2024-05-01

## 2024-05-02 ENCOUNTER — NON-APPOINTMENT (OUTPATIENT)
Age: 24
End: 2024-05-02

## 2024-05-06 ENCOUNTER — NON-APPOINTMENT (OUTPATIENT)
Age: 24
End: 2024-05-06

## 2024-05-07 ENCOUNTER — APPOINTMENT (OUTPATIENT)
Dept: PSYCHIATRY | Facility: CLINIC | Age: 24
End: 2024-05-07
Payer: MEDICAID

## 2024-05-07 PROCEDURE — 90832 PSYTX W PT 30 MINUTES: CPT | Mod: 93

## 2024-05-07 NOTE — END OF VISIT
[Duration of Psychotherapy Visit (minutes spent in synchronous communication): ____] : Duration of Psychotherapy Visit (minutes spent in synchronous communication): [unfilled] [Individual Psychotherapy for 16-37 minutes] : Individual Psychotherapy for 16-37 minutes [Teletherapy Service Provided] : The services provided in this session were delivered via tele-therapy [FreeTextEntry3] : Work -- [employer] [FreeTextEntry5] : [facility]  [Licensed Clinician] : Licensed Clinician

## 2024-05-07 NOTE — REASON FOR VISIT
[Telephone (audio) - Individual/Group] : This telephonic visit was provided via audio only technology. [Patient preference] : Patient preference. [Medical Office: ([facility])___] : The provider was located at the medical office in [unfilled]. [Home] : The patient, [unfilled], was located at home, [unfilled], at the time of the visit. [Verbal consent obtained from patient/other participant(s)] : Verbal consent for telehealth/telephonic services obtained from patient/other participant(s) [FreeTextEntry4] : 7662 [FreeTextEntry5] : 102pm [FreeTextEntry2] : On 8URIS during inpatient hospitalization [Patient] : Patient [FreeTextEntry1] : "I want to feel better about myself."
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
## 2024-05-08 ENCOUNTER — APPOINTMENT (OUTPATIENT)
Dept: PSYCHIATRY | Facility: CLINIC | Age: 24
End: 2024-05-08

## 2024-05-08 ENCOUNTER — NON-APPOINTMENT (OUTPATIENT)
Age: 24
End: 2024-05-08

## 2024-05-14 ENCOUNTER — APPOINTMENT (OUTPATIENT)
Dept: PSYCHIATRY | Facility: CLINIC | Age: 24
End: 2024-05-14
Payer: MEDICAID

## 2024-05-14 DIAGNOSIS — F60.3 BORDERLINE PERSONALITY DISORDER: ICD-10-CM

## 2024-05-14 DIAGNOSIS — F43.10 POST-TRAUMATIC STRESS DISORDER, UNSPECIFIED: ICD-10-CM

## 2024-05-14 PROCEDURE — 90832 PSYTX W PT 30 MINUTES: CPT | Mod: 95

## 2024-05-15 ENCOUNTER — APPOINTMENT (OUTPATIENT)
Dept: PSYCHIATRY | Facility: CLINIC | Age: 24
End: 2024-05-15

## 2024-05-17 PROBLEM — F60.3 BORDERLINE PERSONALITY DISORDER: Status: ACTIVE | Noted: 2022-03-08

## 2024-05-17 PROBLEM — F43.10 PTSD (POST-TRAUMATIC STRESS DISORDER): Status: ACTIVE | Noted: 2022-03-08
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
## 2024-05-17 NOTE — END OF VISIT
[FreeTextEntry3] : Work -- [employer] [FreeTextEntry5] : [facility]

## 2024-05-17 NOTE — PLAN
[FreeTextEntry2] : Increased sense of self-efficacy; increased sense of agency.  [de-identified] : Pt spoke about navigating intense work schedule and change in routine now that her daughter is in an early childhood program. She reflected on efforts to stay focused and engaged, manage complex feelings She would like to engage in DBT group to support skill building. Discussed strategies for supporting group particiaption.  She was thoughtful and collaborative.  [FreeTextEntry1] : Weekly individual therapy. monthly medication management. Not interested in group interventions right now, but following up with other supports related to housing, finances, and occupational development, offered by her . Provided updated paperwork relevant to housing application. Encouraged her to schedule with Dr. Guzman. Scheduled session time for after work next week.  Scheduled for DBT group.

## 2024-05-17 NOTE — REASON FOR VISIT
[FreeTextEntry4] : 230 [FreeTextEntry5] : 3pm [FreeTextEntry2] : On 8URIS during inpatient hospitalization [FreeTextEntry1] : "I want to feel better about myself."

## 2024-05-21 ENCOUNTER — APPOINTMENT (OUTPATIENT)
Dept: PSYCHIATRY | Facility: CLINIC | Age: 24
End: 2024-05-21

## 2024-05-21 ENCOUNTER — NON-APPOINTMENT (OUTPATIENT)
Age: 24
End: 2024-05-21

## 2024-05-22 ENCOUNTER — APPOINTMENT (OUTPATIENT)
Dept: PSYCHIATRY | Facility: CLINIC | Age: 24
End: 2024-05-22

## 2024-05-28 ENCOUNTER — NON-APPOINTMENT (OUTPATIENT)
Age: 24
End: 2024-05-28

## 2024-05-29 ENCOUNTER — APPOINTMENT (OUTPATIENT)
Dept: PSYCHIATRY | Facility: CLINIC | Age: 24
End: 2024-05-29

## 2024-05-29 ENCOUNTER — NON-APPOINTMENT (OUTPATIENT)
Age: 24
End: 2024-05-29

## 2024-06-05 ENCOUNTER — APPOINTMENT (OUTPATIENT)
Dept: PSYCHIATRY | Facility: CLINIC | Age: 24
End: 2024-06-05

## 2024-06-05 ENCOUNTER — NON-APPOINTMENT (OUTPATIENT)
Age: 24
End: 2024-06-05

## 2024-06-11 ENCOUNTER — APPOINTMENT (OUTPATIENT)
Dept: PSYCHIATRY | Facility: CLINIC | Age: 24
End: 2024-06-11

## 2024-06-11 ENCOUNTER — NON-APPOINTMENT (OUTPATIENT)
Age: 24
End: 2024-06-11

## 2024-06-12 ENCOUNTER — APPOINTMENT (OUTPATIENT)
Dept: PSYCHIATRY | Facility: CLINIC | Age: 24
End: 2024-06-12

## 2024-06-12 ENCOUNTER — NON-APPOINTMENT (OUTPATIENT)
Age: 24
End: 2024-06-12

## 2024-06-19 ENCOUNTER — APPOINTMENT (OUTPATIENT)
Dept: PSYCHIATRY | Facility: CLINIC | Age: 24
End: 2024-06-19

## 2024-06-19 ENCOUNTER — NON-APPOINTMENT (OUTPATIENT)
Age: 24
End: 2024-06-19

## 2024-06-25 ENCOUNTER — NON-APPOINTMENT (OUTPATIENT)
Age: 24
End: 2024-06-25

## 2024-06-26 ENCOUNTER — APPOINTMENT (OUTPATIENT)
Dept: PSYCHIATRY | Facility: CLINIC | Age: 24
End: 2024-06-26

## 2024-07-03 ENCOUNTER — APPOINTMENT (OUTPATIENT)
Dept: PSYCHIATRY | Facility: CLINIC | Age: 24
End: 2024-07-03
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
## 2024-07-17 ENCOUNTER — APPOINTMENT (OUTPATIENT)
Dept: PSYCHIATRY | Facility: CLINIC | Age: 24
End: 2024-07-17

## 2024-07-17 ENCOUNTER — NON-APPOINTMENT (OUTPATIENT)
Age: 24
End: 2024-07-17

## 2024-07-24 ENCOUNTER — APPOINTMENT (OUTPATIENT)
Dept: PSYCHIATRY | Facility: CLINIC | Age: 24
End: 2024-07-24

## 2024-07-31 ENCOUNTER — APPOINTMENT (OUTPATIENT)
Dept: PSYCHIATRY | Facility: CLINIC | Age: 24
End: 2024-07-31

## 2024-08-07 ENCOUNTER — APPOINTMENT (OUTPATIENT)
Dept: PSYCHIATRY | Facility: CLINIC | Age: 24
End: 2024-08-07

## 2024-08-14 ENCOUNTER — APPOINTMENT (OUTPATIENT)
Dept: PSYCHIATRY | Facility: CLINIC | Age: 24
End: 2024-08-14

## 2024-08-21 ENCOUNTER — APPOINTMENT (OUTPATIENT)
Dept: PSYCHIATRY | Facility: CLINIC | Age: 24
End: 2024-08-21

## 2024-08-28 ENCOUNTER — APPOINTMENT (OUTPATIENT)
Dept: PSYCHIATRY | Facility: CLINIC | Age: 24
End: 2024-08-28

## 2024-08-28 ENCOUNTER — NON-APPOINTMENT (OUTPATIENT)
Age: 24
End: 2024-08-28

## 2024-08-29 ENCOUNTER — APPOINTMENT (OUTPATIENT)
Dept: ORTHOPEDIC SURGERY | Facility: CLINIC | Age: 24
End: 2024-08-29

## 2024-08-29 ENCOUNTER — NON-APPOINTMENT (OUTPATIENT)
Age: 24
End: 2024-08-29

## 2024-08-29 VITALS — WEIGHT: 220 LBS | BODY MASS INDEX: 32.58 KG/M2 | HEIGHT: 69 IN

## 2024-08-29 DIAGNOSIS — M54.16 RADICULOPATHY, LUMBAR REGION: ICD-10-CM

## 2024-08-29 DIAGNOSIS — G56.02 CARPAL TUNNEL SYNDROME, LEFT UPPER LIMB: ICD-10-CM

## 2024-08-29 PROCEDURE — 72070 X-RAY EXAM THORAC SPINE 2VWS: CPT

## 2024-08-29 PROCEDURE — 99204 OFFICE O/P NEW MOD 45 MIN: CPT

## 2024-08-29 PROCEDURE — 72110 X-RAY EXAM L-2 SPINE 4/>VWS: CPT

## 2024-08-29 RX ORDER — DICLOFENAC SODIUM 75 MG/1
75 TABLET, DELAYED RELEASE ORAL
Qty: 60 | Refills: 1 | Status: ACTIVE | COMMUNITY
Start: 2024-08-29 | End: 1900-01-01

## 2024-08-29 NOTE — HISTORY OF PRESENT ILLNESS
[de-identified] : 25 y/o female presenting with upper to lower back pain x 2 years. It began after she delivered her baby via . She has history of scoliosis diagnosed at 8 years old. She wore a brace at night for 6 years. Her pain radiates into her left leg with prolonged activity. She also complains of left hand pain and intermittent tingling. She is left handed. denies recent illness, fevers, weakness, balance problems, saddle anesthesia, urinary retention or fecal incontinence.

## 2024-08-29 NOTE — ASSESSMENT
[FreeTextEntry1] : 25 y/o female presenting with upper to lower back pain x 2 years. It began after she delivered her baby via . She has history of scoliosis diagnosed at 8 years old. She wore a brace at night for 6 years. Her pain radiates into her left leg with prolonged activity. She also complains of left hand pain and intermittent tingling. She is left handed. denies recent illness, fevers, weakness, balance problems, saddle anesthesia, urinary retention or fecal incontinence. The patient was given a referral for physical therapy. Start diclofenac. Recommended night splinting for left wrist. F/U in 4-6 weeks. We discussed red flag symptoms that would require emergent evaluation. She knows to call with any questions or concerns or if her symptoms acutely worsen.

## 2024-08-29 NOTE — PHYSICAL EXAM
[de-identified] : I ordered radiographs to evaluate the patient's symptoms. Lumbar 4 view radiographs taken in the office today show no dislocation or fracture.  Lumbar spondylosis.  No instability on dynamic series. Scoliosis.  [de-identified] : I ordered radiographs to evaluate the patient's symptoms. Lumbar 4 view radiographs taken in the office today show no dislocation or fracture.  Lumbar spondylosis.  No instability on dynamic series. Thoracic 2 view radiographs taken in the office today show no dislocation or fracture. Thoracolumbar scoliosis.

## 2024-09-04 ENCOUNTER — APPOINTMENT (OUTPATIENT)
Dept: PSYCHIATRY | Facility: CLINIC | Age: 24
End: 2024-09-04

## 2024-09-11 ENCOUNTER — APPOINTMENT (OUTPATIENT)
Dept: PSYCHIATRY | Facility: CLINIC | Age: 24
End: 2024-09-11

## 2024-09-18 ENCOUNTER — APPOINTMENT (OUTPATIENT)
Dept: PSYCHIATRY | Facility: CLINIC | Age: 24
End: 2024-09-18

## 2024-09-25 ENCOUNTER — APPOINTMENT (OUTPATIENT)
Dept: PSYCHIATRY | Facility: CLINIC | Age: 24
End: 2024-09-25

## 2024-10-01 NOTE — BEHAVIORAL HEALTH ASSESSMENT NOTE - NSBHCHARTREVIEWINVESTIGATE_PSY_A_CORE FT
[FreeTextEntry1] : Gral anxiety disorder: / Depression/ restless leg -D/C  Xanax  -Continue Clonazepam -D/C WEllbutrin 100mg daily -Meds risks and benefits d/w pt. -Continue Counseling.  # GERD: -EGD 2021 -On Dexilant> doing better -D/C Omeprazole prn  Ex heavy smoker:> quit 30 years ago -Low dose CT lung>negative 11/2023. Continue annual screening  Hypercholesterolemia: -On Crestor 20mg daily. -Lipid at goal.  HCM; -Blood and UA outpt Mammo: 07/23 Gyn: 2023 with Dr Ballesteros Colonoscopy: up to date. -2021 w/ Dr deal  Immunizations: 2 doses COVID vaccine -complete Shingrix  s/p Liposuction,/ umbilical hernia recurrent: to f/up w/ surgery.      
EKG: NSR at 93 bpm

## 2024-11-04 ENCOUNTER — RX RENEWAL (OUTPATIENT)
Age: 24
End: 2024-11-04

## 2024-11-07 ENCOUNTER — NON-APPOINTMENT (OUTPATIENT)
Age: 24
End: 2024-11-07

## 2025-03-06 NOTE — ED ADULT NURSE REASSESSMENT NOTE - NS ED NURSE REASSESS COMMENT FT1
Problem: Pain  Goal: Acceptable pain level achieved/maintained at rest using appropriate pain scale for the patient  Outcome: Monitoring/Evaluating progress  Goal: Acceptable pain level achieved/maintained with activity using appropriate pain scale for the patient  Outcome: Monitoring/Evaluating progress  Goal: Acceptable pain level achieved/maintained without oversedation  Outcome: Monitoring/Evaluating progress      Tele Psyche consult in progress; constant observation maintained